# Patient Record
Sex: FEMALE | Race: WHITE | NOT HISPANIC OR LATINO | Employment: FULL TIME | URBAN - METROPOLITAN AREA
[De-identification: names, ages, dates, MRNs, and addresses within clinical notes are randomized per-mention and may not be internally consistent; named-entity substitution may affect disease eponyms.]

---

## 2017-01-18 ENCOUNTER — OFFICE VISIT - HEALTHEAST (OUTPATIENT)
Dept: FAMILY MEDICINE | Facility: CLINIC | Age: 57
End: 2017-01-18

## 2017-01-18 DIAGNOSIS — E03.9 HYPOTHYROIDISM, UNSPECIFIED TYPE: ICD-10-CM

## 2017-01-18 DIAGNOSIS — Z00.00 ROUTINE GENERAL MEDICAL EXAMINATION AT A HEALTH CARE FACILITY: ICD-10-CM

## 2017-01-18 DIAGNOSIS — E11.9 TYPE 2 DIABETES MELLITUS WITHOUT COMPLICATION, WITHOUT LONG-TERM CURRENT USE OF INSULIN (H): ICD-10-CM

## 2017-01-18 DIAGNOSIS — E78.01 FAMILIAL HYPERCHOLESTEROLEMIA: ICD-10-CM

## 2017-01-18 DIAGNOSIS — D64.9 ANEMIA, UNSPECIFIED: ICD-10-CM

## 2017-01-18 DIAGNOSIS — R00.0 RACING HEART BEAT: ICD-10-CM

## 2017-01-18 LAB
CHOLEST SERPL-MCNC: 194 MG/DL
HBA1C MFR BLD: 11.4 % (ref 3.5–6)
HDLC SERPL-MCNC: 51 MG/DL
LDLC SERPL CALC-MCNC: 115 MG/DL
TRIGL SERPL-MCNC: 140 MG/DL

## 2017-01-18 ASSESSMENT — MIFFLIN-ST. JEOR: SCORE: 1255.37

## 2017-01-19 ENCOUNTER — COMMUNICATION - HEALTHEAST (OUTPATIENT)
Dept: FAMILY MEDICINE | Facility: CLINIC | Age: 57
End: 2017-01-19

## 2017-01-25 ENCOUNTER — OFFICE VISIT - HEALTHEAST (OUTPATIENT)
Dept: EDUCATION SERVICES | Facility: CLINIC | Age: 57
End: 2017-01-25

## 2017-01-25 ENCOUNTER — COMMUNICATION - HEALTHEAST (OUTPATIENT)
Dept: EDUCATION SERVICES | Facility: CLINIC | Age: 57
End: 2017-01-25

## 2017-01-25 DIAGNOSIS — E11.9 TYPE 2 DIABETES MELLITUS WITHOUT COMPLICATION, WITHOUT LONG-TERM CURRENT USE OF INSULIN (H): ICD-10-CM

## 2017-02-02 ENCOUNTER — COMMUNICATION - HEALTHEAST (OUTPATIENT)
Dept: FAMILY MEDICINE | Facility: CLINIC | Age: 57
End: 2017-02-02

## 2017-02-02 DIAGNOSIS — E03.9 HYPOTHYROIDISM: ICD-10-CM

## 2017-02-02 DIAGNOSIS — E11.9 DIABETES MELLITUS, TYPE 2 (H): ICD-10-CM

## 2017-02-02 DIAGNOSIS — B00.1 COLD SORE: ICD-10-CM

## 2017-02-10 ENCOUNTER — COMMUNICATION - HEALTHEAST (OUTPATIENT)
Dept: FAMILY MEDICINE | Facility: CLINIC | Age: 57
End: 2017-02-10

## 2017-02-15 ENCOUNTER — COMMUNICATION - HEALTHEAST (OUTPATIENT)
Dept: EDUCATION SERVICES | Facility: CLINIC | Age: 57
End: 2017-02-15

## 2017-03-10 ENCOUNTER — COMMUNICATION - HEALTHEAST (OUTPATIENT)
Dept: EDUCATION SERVICES | Facility: CLINIC | Age: 57
End: 2017-03-10

## 2017-03-10 ENCOUNTER — COMMUNICATION - HEALTHEAST (OUTPATIENT)
Dept: FAMILY MEDICINE | Facility: CLINIC | Age: 57
End: 2017-03-10

## 2017-03-10 DIAGNOSIS — E11.9 DIABETES MELLITUS (H): ICD-10-CM

## 2017-03-11 ENCOUNTER — COMMUNICATION - HEALTHEAST (OUTPATIENT)
Dept: FAMILY MEDICINE | Facility: CLINIC | Age: 57
End: 2017-03-11

## 2017-03-11 DIAGNOSIS — E11.9 DIABETES MELLITUS (H): ICD-10-CM

## 2017-03-11 DIAGNOSIS — E11.9 TYPE 2 DIABETES MELLITUS (H): ICD-10-CM

## 2017-03-26 ENCOUNTER — COMMUNICATION - HEALTHEAST (OUTPATIENT)
Dept: FAMILY MEDICINE | Facility: CLINIC | Age: 57
End: 2017-03-26

## 2017-03-26 DIAGNOSIS — E78.5 HYPERLIPIDEMIA: ICD-10-CM

## 2017-05-03 ENCOUNTER — COMMUNICATION - HEALTHEAST (OUTPATIENT)
Dept: SCHEDULING | Facility: CLINIC | Age: 57
End: 2017-05-03

## 2017-05-03 DIAGNOSIS — E11.9 DIABETES MELLITUS (H): ICD-10-CM

## 2017-05-24 ENCOUNTER — OFFICE VISIT - HEALTHEAST (OUTPATIENT)
Dept: FAMILY MEDICINE | Facility: CLINIC | Age: 57
End: 2017-05-24

## 2017-05-24 DIAGNOSIS — E11.9 TYPE 2 DIABETES MELLITUS WITHOUT COMPLICATION, WITHOUT LONG-TERM CURRENT USE OF INSULIN (H): ICD-10-CM

## 2017-05-24 LAB — HBA1C MFR BLD: 9.3 % (ref 3.5–6)

## 2017-05-24 ASSESSMENT — MIFFLIN-ST. JEOR: SCORE: 1239.5

## 2017-05-31 ENCOUNTER — COMMUNICATION - HEALTHEAST (OUTPATIENT)
Dept: FAMILY MEDICINE | Facility: CLINIC | Age: 57
End: 2017-05-31

## 2017-06-06 ENCOUNTER — COMMUNICATION - HEALTHEAST (OUTPATIENT)
Dept: SCHEDULING | Facility: CLINIC | Age: 57
End: 2017-06-06

## 2017-06-23 ENCOUNTER — COMMUNICATION - HEALTHEAST (OUTPATIENT)
Dept: FAMILY MEDICINE | Facility: CLINIC | Age: 57
End: 2017-06-23

## 2017-06-23 ENCOUNTER — COMMUNICATION - HEALTHEAST (OUTPATIENT)
Dept: SCHEDULING | Facility: CLINIC | Age: 57
End: 2017-06-23

## 2017-06-23 DIAGNOSIS — E78.5 HYPERLIPIDEMIA: ICD-10-CM

## 2017-06-23 DIAGNOSIS — E11.9 TYPE 2 DIABETES MELLITUS WITHOUT COMPLICATION, WITHOUT LONG-TERM CURRENT USE OF INSULIN (H): ICD-10-CM

## 2017-06-23 DIAGNOSIS — E11.9 TYPE 2 DIABETES MELLITUS (H): ICD-10-CM

## 2017-08-05 ENCOUNTER — COMMUNICATION - HEALTHEAST (OUTPATIENT)
Dept: FAMILY MEDICINE | Facility: CLINIC | Age: 57
End: 2017-08-05

## 2017-08-05 DIAGNOSIS — E78.5 HYPERLIPIDEMIA: ICD-10-CM

## 2017-08-09 ENCOUNTER — COMMUNICATION - HEALTHEAST (OUTPATIENT)
Dept: FAMILY MEDICINE | Facility: CLINIC | Age: 57
End: 2017-08-09

## 2017-08-09 DIAGNOSIS — E11.9 DIABETES MELLITUS (H): ICD-10-CM

## 2017-10-05 ENCOUNTER — COMMUNICATION - HEALTHEAST (OUTPATIENT)
Dept: FAMILY MEDICINE | Facility: CLINIC | Age: 57
End: 2017-10-05

## 2017-10-13 ENCOUNTER — COMMUNICATION - HEALTHEAST (OUTPATIENT)
Dept: FAMILY MEDICINE | Facility: CLINIC | Age: 57
End: 2017-10-13

## 2017-10-13 DIAGNOSIS — E11.9 TYPE 2 DIABETES MELLITUS (H): ICD-10-CM

## 2017-10-18 ENCOUNTER — OFFICE VISIT - HEALTHEAST (OUTPATIENT)
Dept: FAMILY MEDICINE | Facility: CLINIC | Age: 57
End: 2017-10-18

## 2017-10-18 DIAGNOSIS — M25.549 PAIN IN MULTIPLE FINGER JOINTS: ICD-10-CM

## 2017-10-18 DIAGNOSIS — E11.9 TYPE 2 DIABETES MELLITUS WITHOUT COMPLICATION, WITHOUT LONG-TERM CURRENT USE OF INSULIN (H): ICD-10-CM

## 2017-10-18 LAB — HBA1C MFR BLD: 7.8 % (ref 3.5–6)

## 2017-10-18 ASSESSMENT — MIFFLIN-ST. JEOR: SCORE: 1224.53

## 2017-10-23 ENCOUNTER — OFFICE VISIT - HEALTHEAST (OUTPATIENT)
Dept: EDUCATION SERVICES | Facility: CLINIC | Age: 57
End: 2017-10-23

## 2017-10-23 ENCOUNTER — COMMUNICATION - HEALTHEAST (OUTPATIENT)
Dept: ENDOCRINOLOGY | Facility: CLINIC | Age: 57
End: 2017-10-23

## 2017-10-23 DIAGNOSIS — E11.9 TYPE 2 DIABETES MELLITUS WITHOUT COMPLICATION, WITHOUT LONG-TERM CURRENT USE OF INSULIN (H): ICD-10-CM

## 2017-12-02 ENCOUNTER — COMMUNICATION - HEALTHEAST (OUTPATIENT)
Dept: FAMILY MEDICINE | Facility: CLINIC | Age: 57
End: 2017-12-02

## 2017-12-02 DIAGNOSIS — E11.9 DIABETES MELLITUS, TYPE 2 (H): ICD-10-CM

## 2017-12-18 ENCOUNTER — RECORDS - HEALTHEAST (OUTPATIENT)
Dept: ADMINISTRATIVE | Facility: OTHER | Age: 57
End: 2017-12-18

## 2017-12-27 ENCOUNTER — COMMUNICATION - HEALTHEAST (OUTPATIENT)
Dept: FAMILY MEDICINE | Facility: CLINIC | Age: 57
End: 2017-12-27

## 2017-12-27 DIAGNOSIS — E11.9 DIABETES MELLITUS (H): ICD-10-CM

## 2017-12-29 ENCOUNTER — RECORDS - HEALTHEAST (OUTPATIENT)
Dept: ADMINISTRATIVE | Facility: OTHER | Age: 57
End: 2017-12-29

## 2018-01-08 ENCOUNTER — RECORDS - HEALTHEAST (OUTPATIENT)
Dept: ADMINISTRATIVE | Facility: OTHER | Age: 58
End: 2018-01-08

## 2018-01-28 ENCOUNTER — COMMUNICATION - HEALTHEAST (OUTPATIENT)
Dept: FAMILY MEDICINE | Facility: CLINIC | Age: 58
End: 2018-01-28

## 2018-01-28 DIAGNOSIS — E03.9 HYPOTHYROIDISM: ICD-10-CM

## 2018-01-31 ENCOUNTER — COMMUNICATION - HEALTHEAST (OUTPATIENT)
Dept: FAMILY MEDICINE | Facility: CLINIC | Age: 58
End: 2018-01-31

## 2018-03-16 ENCOUNTER — COMMUNICATION - HEALTHEAST (OUTPATIENT)
Dept: FAMILY MEDICINE | Facility: CLINIC | Age: 58
End: 2018-03-16

## 2018-03-16 DIAGNOSIS — E78.5 HYPERLIPIDEMIA: ICD-10-CM

## 2018-03-24 ENCOUNTER — COMMUNICATION - HEALTHEAST (OUTPATIENT)
Dept: FAMILY MEDICINE | Facility: CLINIC | Age: 58
End: 2018-03-24

## 2018-03-24 DIAGNOSIS — E03.9 HYPOTHYROIDISM, UNSPECIFIED TYPE: ICD-10-CM

## 2018-03-24 DIAGNOSIS — E03.9 HYPOTHYROIDISM: ICD-10-CM

## 2018-06-18 ENCOUNTER — COMMUNICATION - HEALTHEAST (OUTPATIENT)
Dept: FAMILY MEDICINE | Facility: CLINIC | Age: 58
End: 2018-06-18

## 2018-06-18 DIAGNOSIS — E11.9 DIABETES MELLITUS, TYPE 2 (H): ICD-10-CM

## 2018-08-01 ENCOUNTER — COMMUNICATION - HEALTHEAST (OUTPATIENT)
Dept: FAMILY MEDICINE | Facility: CLINIC | Age: 58
End: 2018-08-01

## 2018-08-01 DIAGNOSIS — E03.9 HYPOTHYROIDISM, UNSPECIFIED TYPE: ICD-10-CM

## 2018-09-13 ENCOUNTER — COMMUNICATION - HEALTHEAST (OUTPATIENT)
Dept: FAMILY MEDICINE | Facility: CLINIC | Age: 58
End: 2018-09-13

## 2018-09-13 DIAGNOSIS — E11.9 DIABETES MELLITUS, TYPE 2 (H): ICD-10-CM

## 2018-09-27 ENCOUNTER — COMMUNICATION - HEALTHEAST (OUTPATIENT)
Dept: FAMILY MEDICINE | Facility: CLINIC | Age: 58
End: 2018-09-27

## 2018-09-27 DIAGNOSIS — E78.5 HYPERLIPIDEMIA: ICD-10-CM

## 2018-11-01 ENCOUNTER — COMMUNICATION - HEALTHEAST (OUTPATIENT)
Dept: FAMILY MEDICINE | Facility: CLINIC | Age: 58
End: 2018-11-01

## 2018-11-01 DIAGNOSIS — E11.9 TYPE 2 DIABETES MELLITUS WITHOUT COMPLICATION, WITHOUT LONG-TERM CURRENT USE OF INSULIN (H): ICD-10-CM

## 2018-11-06 ENCOUNTER — COMMUNICATION - HEALTHEAST (OUTPATIENT)
Dept: FAMILY MEDICINE | Facility: CLINIC | Age: 58
End: 2018-11-06

## 2018-11-14 ENCOUNTER — COMMUNICATION - HEALTHEAST (OUTPATIENT)
Dept: FAMILY MEDICINE | Facility: CLINIC | Age: 58
End: 2018-11-14

## 2018-11-14 DIAGNOSIS — E03.9 HYPOTHYROIDISM, UNSPECIFIED TYPE: ICD-10-CM

## 2018-12-03 ENCOUNTER — COMMUNICATION - HEALTHEAST (OUTPATIENT)
Dept: FAMILY MEDICINE | Facility: CLINIC | Age: 58
End: 2018-12-03

## 2018-12-03 DIAGNOSIS — E11.9 TYPE 2 DIABETES MELLITUS WITHOUT COMPLICATION, WITHOUT LONG-TERM CURRENT USE OF INSULIN (H): ICD-10-CM

## 2018-12-05 ENCOUNTER — OFFICE VISIT - HEALTHEAST (OUTPATIENT)
Dept: FAMILY MEDICINE | Facility: CLINIC | Age: 58
End: 2018-12-05

## 2018-12-05 ENCOUNTER — HOSPITAL ENCOUNTER (OUTPATIENT)
Dept: ULTRASOUND IMAGING | Facility: HOSPITAL | Age: 58
Discharge: HOME | End: 2018-12-05
Attending: FAMILY MEDICINE

## 2018-12-05 DIAGNOSIS — Z00.00 ROUTINE GENERAL MEDICAL EXAMINATION AT A HEALTH CARE FACILITY: ICD-10-CM

## 2018-12-05 DIAGNOSIS — E11.9 TYPE 2 DIABETES MELLITUS WITHOUT COMPLICATION, WITHOUT LONG-TERM CURRENT USE OF INSULIN (H): ICD-10-CM

## 2018-12-05 DIAGNOSIS — N95.0 POSTMENOPAUSAL BLEEDING: ICD-10-CM

## 2018-12-05 DIAGNOSIS — Z12.4 SCREENING FOR MALIGNANT NEOPLASM OF CERVIX: ICD-10-CM

## 2018-12-05 DIAGNOSIS — E03.9 HYPOTHYROIDISM, UNSPECIFIED TYPE: ICD-10-CM

## 2018-12-05 DIAGNOSIS — E78.01 FAMILIAL HYPERCHOLESTEROLEMIA: ICD-10-CM

## 2018-12-05 LAB
ANION GAP SERPL CALCULATED.3IONS-SCNC: 13 MMOL/L (ref 5–18)
BUN SERPL-MCNC: 14 MG/DL (ref 8–22)
CALCIUM SERPL-MCNC: 10.1 MG/DL (ref 8.5–10.5)
CHLORIDE BLD-SCNC: 105 MMOL/L (ref 98–107)
CHOLEST SERPL-MCNC: 163 MG/DL
CO2 SERPL-SCNC: 22 MMOL/L (ref 22–31)
CREAT SERPL-MCNC: 0.84 MG/DL (ref 0.6–1.1)
CREAT UR-MCNC: 173.8 MG/DL
FASTING STATUS PATIENT QL REPORTED: NO
GFR SERPL CREATININE-BSD FRML MDRD: >60 ML/MIN/1.73M2
GLUCOSE BLD-MCNC: 152 MG/DL (ref 70–125)
HBA1C MFR BLD: 7.9 % (ref 3.5–6)
HDLC SERPL-MCNC: 61 MG/DL
LDLC SERPL CALC-MCNC: 66 MG/DL
MICROALBUMIN UR-MCNC: 1.21 MG/DL (ref 0–1.99)
MICROALBUMIN/CREAT UR: 7 MG/G
POTASSIUM BLD-SCNC: 4.8 MMOL/L (ref 3.5–5)
SODIUM SERPL-SCNC: 140 MMOL/L (ref 136–145)
TRIGL SERPL-MCNC: 178 MG/DL
TSH SERPL DL<=0.005 MIU/L-ACNC: 0.71 UIU/ML (ref 0.3–5)

## 2018-12-05 ASSESSMENT — MIFFLIN-ST. JEOR: SCORE: 1225.43

## 2018-12-06 LAB
HPV SOURCE: NORMAL
HUMAN PAPILLOMA VIRUS 16 DNA: NEGATIVE
HUMAN PAPILLOMA VIRUS 18 DNA: NEGATIVE
HUMAN PAPILLOMA VIRUS FINAL DIAGNOSIS: NORMAL
HUMAN PAPILLOMA VIRUS OTHER HR: NEGATIVE
SPECIMEN DESCRIPTION: NORMAL

## 2018-12-07 ENCOUNTER — RECORDS - HEALTHEAST (OUTPATIENT)
Dept: ADMINISTRATIVE | Facility: OTHER | Age: 58
End: 2018-12-07

## 2018-12-13 LAB
BKR LAB AP ABNORMAL BLEEDING: NORMAL
BKR LAB AP BIRTH CONTROL/HORMONES: NORMAL
BKR LAB AP CERVICAL APPEARANCE: NORMAL
BKR LAB AP GYN ADEQUACY: NORMAL
BKR LAB AP GYN INTERPRETATION: NORMAL
BKR LAB AP HPV REFLEX: NORMAL
BKR LAB AP LMP: NORMAL
BKR LAB AP PATIENT STATUS: NORMAL
BKR LAB AP PREVIOUS ABNORMAL: NORMAL
BKR LAB AP PREVIOUS NORMAL: NORMAL
HIGH RISK?: NO
PATH REPORT.COMMENTS IMP SPEC: NORMAL
RESULT FLAG (HE HISTORICAL CONVERSION): NORMAL

## 2018-12-21 ENCOUNTER — HOSPITAL ENCOUNTER (OUTPATIENT)
Dept: MAMMOGRAPHY | Facility: CLINIC | Age: 58
Discharge: HOME | End: 2018-12-21
Attending: FAMILY MEDICINE

## 2018-12-21 DIAGNOSIS — Z12.31 VISIT FOR SCREENING MAMMOGRAM: ICD-10-CM

## 2018-12-28 ENCOUNTER — COMMUNICATION - HEALTHEAST (OUTPATIENT)
Dept: FAMILY MEDICINE | Facility: CLINIC | Age: 58
End: 2018-12-28

## 2018-12-28 DIAGNOSIS — E11.9 TYPE 2 DIABETES MELLITUS WITHOUT COMPLICATION, WITHOUT LONG-TERM CURRENT USE OF INSULIN (H): ICD-10-CM

## 2019-01-13 ENCOUNTER — COMMUNICATION - HEALTHEAST (OUTPATIENT)
Dept: FAMILY MEDICINE | Facility: CLINIC | Age: 59
End: 2019-01-13

## 2019-01-13 DIAGNOSIS — E78.5 HYPERLIPIDEMIA: ICD-10-CM

## 2019-02-09 ENCOUNTER — COMMUNICATION - HEALTHEAST (OUTPATIENT)
Dept: FAMILY MEDICINE | Facility: CLINIC | Age: 59
End: 2019-02-09

## 2019-02-09 DIAGNOSIS — E03.9 HYPOTHYROIDISM, UNSPECIFIED TYPE: ICD-10-CM

## 2019-03-20 ENCOUNTER — COMMUNICATION - HEALTHEAST (OUTPATIENT)
Dept: FAMILY MEDICINE | Facility: CLINIC | Age: 59
End: 2019-03-20

## 2019-03-20 DIAGNOSIS — E11.9 DIABETES MELLITUS, TYPE 2 (H): ICD-10-CM

## 2019-03-29 ENCOUNTER — RECORDS - HEALTHEAST (OUTPATIENT)
Dept: ADMINISTRATIVE | Facility: OTHER | Age: 59
End: 2019-03-29

## 2019-04-03 ENCOUNTER — COMMUNICATION - HEALTHEAST (OUTPATIENT)
Dept: FAMILY MEDICINE | Facility: CLINIC | Age: 59
End: 2019-04-03

## 2019-04-03 ENCOUNTER — OFFICE VISIT - HEALTHEAST (OUTPATIENT)
Dept: FAMILY MEDICINE | Facility: CLINIC | Age: 59
End: 2019-04-03

## 2019-04-03 DIAGNOSIS — M65.4 DE QUERVAIN'S DISEASE (RADIAL STYLOID TENOSYNOVITIS): ICD-10-CM

## 2019-04-03 DIAGNOSIS — B00.1 RECURRENT COLD SORES: ICD-10-CM

## 2019-04-03 DIAGNOSIS — E11.9 TYPE 2 DIABETES MELLITUS WITHOUT COMPLICATION, WITHOUT LONG-TERM CURRENT USE OF INSULIN (H): ICD-10-CM

## 2019-04-03 DIAGNOSIS — G56.01 CARPAL TUNNEL SYNDROME OF RIGHT WRIST: ICD-10-CM

## 2019-04-03 DIAGNOSIS — M65.30 TRIGGER FINGER, ACQUIRED: ICD-10-CM

## 2019-04-03 DIAGNOSIS — Z01.818 PREOPERATIVE EXAMINATION: ICD-10-CM

## 2019-04-03 LAB
ANION GAP SERPL CALCULATED.3IONS-SCNC: 12 MMOL/L (ref 5–18)
BUN SERPL-MCNC: 15 MG/DL (ref 8–22)
CALCIUM SERPL-MCNC: 10.2 MG/DL (ref 8.5–10.5)
CHLORIDE BLD-SCNC: 105 MMOL/L (ref 98–107)
CO2 SERPL-SCNC: 23 MMOL/L (ref 22–31)
CREAT SERPL-MCNC: 0.84 MG/DL (ref 0.6–1.1)
GFR SERPL CREATININE-BSD FRML MDRD: >60 ML/MIN/1.73M2
GLUCOSE BLD-MCNC: 166 MG/DL (ref 70–125)
HBA1C MFR BLD: 7.6 % (ref 3.5–6)
HGB BLD-MCNC: 11.1 G/DL (ref 12–16)
POTASSIUM BLD-SCNC: 4.5 MMOL/L (ref 3.5–5)
SODIUM SERPL-SCNC: 140 MMOL/L (ref 136–145)

## 2019-04-03 ASSESSMENT — MIFFLIN-ST. JEOR: SCORE: 1218.63

## 2019-05-03 ENCOUNTER — RECORDS - HEALTHEAST (OUTPATIENT)
Dept: ADMINISTRATIVE | Facility: OTHER | Age: 59
End: 2019-05-03

## 2019-05-12 ENCOUNTER — COMMUNICATION - HEALTHEAST (OUTPATIENT)
Dept: FAMILY MEDICINE | Facility: CLINIC | Age: 59
End: 2019-05-12

## 2019-05-12 DIAGNOSIS — E11.9 TYPE 2 DIABETES MELLITUS WITHOUT COMPLICATION, WITHOUT LONG-TERM CURRENT USE OF INSULIN (H): ICD-10-CM

## 2019-05-23 ENCOUNTER — COMMUNICATION - HEALTHEAST (OUTPATIENT)
Dept: FAMILY MEDICINE | Facility: CLINIC | Age: 59
End: 2019-05-23

## 2019-05-23 DIAGNOSIS — E11.9 TYPE 2 DIABETES MELLITUS WITHOUT COMPLICATION, WITHOUT LONG-TERM CURRENT USE OF INSULIN (H): ICD-10-CM

## 2019-05-31 ENCOUNTER — RECORDS - HEALTHEAST (OUTPATIENT)
Dept: ADMINISTRATIVE | Facility: OTHER | Age: 59
End: 2019-05-31

## 2019-08-09 ENCOUNTER — COMMUNICATION - HEALTHEAST (OUTPATIENT)
Dept: FAMILY MEDICINE | Facility: CLINIC | Age: 59
End: 2019-08-09

## 2019-08-09 DIAGNOSIS — E11.9 TYPE 2 DIABETES MELLITUS WITHOUT COMPLICATION, WITHOUT LONG-TERM CURRENT USE OF INSULIN (H): ICD-10-CM

## 2019-08-30 ENCOUNTER — COMMUNICATION - HEALTHEAST (OUTPATIENT)
Dept: FAMILY MEDICINE | Facility: CLINIC | Age: 59
End: 2019-08-30

## 2019-10-30 ENCOUNTER — COMMUNICATION - HEALTHEAST (OUTPATIENT)
Dept: FAMILY MEDICINE | Facility: CLINIC | Age: 59
End: 2019-10-30

## 2019-10-30 DIAGNOSIS — E11.9 TYPE 2 DIABETES MELLITUS WITHOUT COMPLICATION, WITHOUT LONG-TERM CURRENT USE OF INSULIN (H): ICD-10-CM

## 2019-11-04 ENCOUNTER — OFFICE VISIT - HEALTHEAST (OUTPATIENT)
Dept: FAMILY MEDICINE | Facility: CLINIC | Age: 59
End: 2019-11-04

## 2019-11-04 ENCOUNTER — COMMUNICATION - HEALTHEAST (OUTPATIENT)
Dept: SCHEDULING | Facility: CLINIC | Age: 59
End: 2019-11-04

## 2019-11-04 DIAGNOSIS — J02.9 SORE THROAT: ICD-10-CM

## 2019-11-04 DIAGNOSIS — R07.0 THROAT PAIN: ICD-10-CM

## 2019-11-04 DIAGNOSIS — J01.90 ACUTE NON-RECURRENT SINUSITIS, UNSPECIFIED LOCATION: ICD-10-CM

## 2019-11-04 DIAGNOSIS — E11.9 TYPE 2 DIABETES MELLITUS WITHOUT COMPLICATION, WITHOUT LONG-TERM CURRENT USE OF INSULIN (H): ICD-10-CM

## 2019-11-04 LAB — DEPRECATED S PYO AG THROAT QL EIA: NORMAL

## 2019-11-05 LAB — GROUP A STREP BY PCR: NORMAL

## 2019-11-22 ENCOUNTER — OFFICE VISIT - HEALTHEAST (OUTPATIENT)
Dept: FAMILY MEDICINE | Facility: CLINIC | Age: 59
End: 2019-11-22

## 2019-11-22 DIAGNOSIS — E78.01 FAMILIAL HYPERCHOLESTEROLEMIA: ICD-10-CM

## 2019-11-22 DIAGNOSIS — E11.65 TYPE 2 DIABETES MELLITUS WITH HYPERGLYCEMIA, WITHOUT LONG-TERM CURRENT USE OF INSULIN (H): ICD-10-CM

## 2019-11-22 DIAGNOSIS — E03.9 HYPOTHYROIDISM, UNSPECIFIED TYPE: ICD-10-CM

## 2019-11-22 DIAGNOSIS — D50.9 IRON DEFICIENCY ANEMIA, UNSPECIFIED IRON DEFICIENCY ANEMIA TYPE: ICD-10-CM

## 2019-11-22 LAB
CHOLEST SERPL-MCNC: 134 MG/DL
ERYTHROCYTE [DISTWIDTH] IN BLOOD BY AUTOMATED COUNT: 14.4 % (ref 11–14.5)
FASTING STATUS PATIENT QL REPORTED: YES
HBA1C MFR BLD: 9.2 % (ref 3.5–6)
HCT VFR BLD AUTO: 35.8 % (ref 35–47)
HDLC SERPL-MCNC: 46 MG/DL
HGB BLD-MCNC: 11.2 G/DL (ref 12–16)
IRON SATN MFR SERPL: 9 % (ref 20–50)
IRON SERPL-MCNC: 29 UG/DL (ref 42–175)
LDLC SERPL CALC-MCNC: 69 MG/DL
MCH RBC QN AUTO: 24.1 PG (ref 27–34)
MCHC RBC AUTO-ENTMCNC: 31.4 G/DL (ref 32–36)
MCV RBC AUTO: 77 FL (ref 80–100)
PLATELET # BLD AUTO: 262 THOU/UL (ref 140–440)
PMV BLD AUTO: 7.6 FL (ref 7–10)
RBC # BLD AUTO: 4.66 MILL/UL (ref 3.8–5.4)
TIBC SERPL-MCNC: 341 UG/DL (ref 313–563)
TRANSFERRIN SERPL-MCNC: 273 MG/DL (ref 212–360)
TRIGL SERPL-MCNC: 93 MG/DL
TSH SERPL DL<=0.005 MIU/L-ACNC: 0.04 UIU/ML (ref 0.3–5)
WBC: 5 THOU/UL (ref 4–11)

## 2019-11-22 ASSESSMENT — MIFFLIN-ST. JEOR: SCORE: 1209.1

## 2019-11-25 ENCOUNTER — COMMUNICATION - HEALTHEAST (OUTPATIENT)
Dept: FAMILY MEDICINE | Facility: CLINIC | Age: 59
End: 2019-11-25

## 2019-11-25 DIAGNOSIS — D50.9 IRON DEFICIENCY ANEMIA, UNSPECIFIED IRON DEFICIENCY ANEMIA TYPE: ICD-10-CM

## 2019-11-26 ENCOUNTER — COMMUNICATION - HEALTHEAST (OUTPATIENT)
Dept: FAMILY MEDICINE | Facility: CLINIC | Age: 59
End: 2019-11-26

## 2019-11-26 DIAGNOSIS — E11.9 TYPE 2 DIABETES MELLITUS WITHOUT COMPLICATION, WITHOUT LONG-TERM CURRENT USE OF INSULIN (H): ICD-10-CM

## 2019-11-27 ENCOUNTER — OFFICE VISIT - HEALTHEAST (OUTPATIENT)
Dept: EDUCATION SERVICES | Facility: CLINIC | Age: 59
End: 2019-11-27

## 2019-11-27 DIAGNOSIS — E11.9 TYPE 2 DIABETES MELLITUS WITHOUT COMPLICATION, WITHOUT LONG-TERM CURRENT USE OF INSULIN (H): ICD-10-CM

## 2019-12-02 ENCOUNTER — OFFICE VISIT - HEALTHEAST (OUTPATIENT)
Dept: EDUCATION SERVICES | Facility: CLINIC | Age: 59
End: 2019-12-02

## 2019-12-02 DIAGNOSIS — E11.9 TYPE 2 DIABETES MELLITUS WITHOUT COMPLICATION, WITHOUT LONG-TERM CURRENT USE OF INSULIN (H): ICD-10-CM

## 2019-12-10 ENCOUNTER — COMMUNICATION - HEALTHEAST (OUTPATIENT)
Dept: FAMILY MEDICINE | Facility: CLINIC | Age: 59
End: 2019-12-10

## 2019-12-10 DIAGNOSIS — E11.9 TYPE 2 DIABETES MELLITUS WITHOUT COMPLICATION, WITHOUT LONG-TERM CURRENT USE OF INSULIN (H): ICD-10-CM

## 2019-12-13 ENCOUNTER — RECORDS - HEALTHEAST (OUTPATIENT)
Dept: ADMINISTRATIVE | Facility: OTHER | Age: 59
End: 2019-12-13

## 2019-12-13 LAB — RETINOPATHY: NEGATIVE

## 2019-12-16 ENCOUNTER — RECORDS - HEALTHEAST (OUTPATIENT)
Dept: ADMINISTRATIVE | Facility: OTHER | Age: 59
End: 2019-12-16

## 2019-12-23 ENCOUNTER — COMMUNICATION - HEALTHEAST (OUTPATIENT)
Dept: FAMILY MEDICINE | Facility: CLINIC | Age: 59
End: 2019-12-23

## 2019-12-23 DIAGNOSIS — E78.5 HYPERLIPIDEMIA: ICD-10-CM

## 2019-12-23 DIAGNOSIS — E11.9 DIABETES MELLITUS, TYPE 2 (H): ICD-10-CM

## 2019-12-24 ENCOUNTER — RECORDS - HEALTHEAST (OUTPATIENT)
Dept: HEALTH INFORMATION MANAGEMENT | Facility: CLINIC | Age: 59
End: 2019-12-24

## 2019-12-31 ENCOUNTER — COMMUNICATION - HEALTHEAST (OUTPATIENT)
Dept: ADMINISTRATIVE | Facility: CLINIC | Age: 59
End: 2019-12-31

## 2019-12-31 ENCOUNTER — COMMUNICATION - HEALTHEAST (OUTPATIENT)
Dept: FAMILY MEDICINE | Facility: CLINIC | Age: 59
End: 2019-12-31

## 2019-12-31 ENCOUNTER — OFFICE VISIT - HEALTHEAST (OUTPATIENT)
Dept: EDUCATION SERVICES | Facility: CLINIC | Age: 59
End: 2019-12-31

## 2019-12-31 DIAGNOSIS — E11.9 TYPE 2 DIABETES MELLITUS WITHOUT COMPLICATION, WITHOUT LONG-TERM CURRENT USE OF INSULIN (H): ICD-10-CM

## 2020-01-01 ENCOUNTER — RECORDS - HEALTHEAST (OUTPATIENT)
Dept: ADMINISTRATIVE | Facility: OTHER | Age: 60
End: 2020-01-01

## 2020-01-08 ENCOUNTER — COMMUNICATION - HEALTHEAST (OUTPATIENT)
Dept: FAMILY MEDICINE | Facility: CLINIC | Age: 60
End: 2020-01-08

## 2020-01-08 DIAGNOSIS — D50.9 IRON DEFICIENCY ANEMIA, UNSPECIFIED IRON DEFICIENCY ANEMIA TYPE: ICD-10-CM

## 2020-01-08 DIAGNOSIS — E11.9 TYPE 2 DIABETES MELLITUS WITHOUT COMPLICATION, WITHOUT LONG-TERM CURRENT USE OF INSULIN (H): ICD-10-CM

## 2020-01-08 DIAGNOSIS — E78.5 HYPERLIPIDEMIA: ICD-10-CM

## 2020-01-08 DIAGNOSIS — E03.9 HYPOTHYROIDISM, UNSPECIFIED TYPE: ICD-10-CM

## 2020-01-08 DIAGNOSIS — E11.9 DIABETES MELLITUS, TYPE 2 (H): ICD-10-CM

## 2020-01-10 ENCOUNTER — COMMUNICATION - HEALTHEAST (OUTPATIENT)
Dept: SCHEDULING | Facility: CLINIC | Age: 60
End: 2020-01-10

## 2020-01-10 ENCOUNTER — COMMUNICATION - HEALTHEAST (OUTPATIENT)
Dept: FAMILY MEDICINE | Facility: CLINIC | Age: 60
End: 2020-01-10

## 2020-01-17 ENCOUNTER — COMMUNICATION - HEALTHEAST (OUTPATIENT)
Dept: SCHEDULING | Facility: CLINIC | Age: 60
End: 2020-01-17

## 2020-01-18 ENCOUNTER — RECORDS - HEALTHEAST (OUTPATIENT)
Dept: ADMINISTRATIVE | Facility: OTHER | Age: 60
End: 2020-01-18

## 2020-01-20 ENCOUNTER — COMMUNICATION - HEALTHEAST (OUTPATIENT)
Dept: SCHEDULING | Facility: CLINIC | Age: 60
End: 2020-01-20

## 2020-01-26 ENCOUNTER — COMMUNICATION - HEALTHEAST (OUTPATIENT)
Dept: FAMILY MEDICINE | Facility: CLINIC | Age: 60
End: 2020-01-26

## 2020-01-26 DIAGNOSIS — E11.9 DIABETES MELLITUS, TYPE 2 (H): ICD-10-CM

## 2020-03-17 ENCOUNTER — COMMUNICATION - HEALTHEAST (OUTPATIENT)
Dept: FAMILY MEDICINE | Facility: CLINIC | Age: 60
End: 2020-03-17

## 2020-03-17 DIAGNOSIS — E11.9 TYPE 2 DIABETES MELLITUS WITHOUT COMPLICATION, WITHOUT LONG-TERM CURRENT USE OF INSULIN (H): ICD-10-CM

## 2020-03-23 ENCOUNTER — COMMUNICATION - HEALTHEAST (OUTPATIENT)
Dept: FAMILY MEDICINE | Facility: CLINIC | Age: 60
End: 2020-03-23

## 2020-03-23 DIAGNOSIS — E11.9 TYPE 2 DIABETES MELLITUS WITHOUT COMPLICATION, WITHOUT LONG-TERM CURRENT USE OF INSULIN (H): ICD-10-CM

## 2020-03-25 ENCOUNTER — COMMUNICATION - HEALTHEAST (OUTPATIENT)
Dept: FAMILY MEDICINE | Facility: CLINIC | Age: 60
End: 2020-03-25

## 2020-03-25 DIAGNOSIS — E03.9 HYPOTHYROIDISM, UNSPECIFIED TYPE: ICD-10-CM

## 2020-03-26 ENCOUNTER — COMMUNICATION - HEALTHEAST (OUTPATIENT)
Dept: FAMILY MEDICINE | Facility: CLINIC | Age: 60
End: 2020-03-26

## 2020-03-26 DIAGNOSIS — E11.9 DIABETES MELLITUS, TYPE 2 (H): ICD-10-CM

## 2020-04-06 ENCOUNTER — COMMUNICATION - HEALTHEAST (OUTPATIENT)
Dept: FAMILY MEDICINE | Facility: CLINIC | Age: 60
End: 2020-04-06

## 2020-04-06 DIAGNOSIS — E11.9 TYPE 2 DIABETES MELLITUS WITHOUT COMPLICATION, WITHOUT LONG-TERM CURRENT USE OF INSULIN (H): ICD-10-CM

## 2020-04-07 ENCOUNTER — COMMUNICATION - HEALTHEAST (OUTPATIENT)
Dept: FAMILY MEDICINE | Facility: CLINIC | Age: 60
End: 2020-04-07

## 2020-04-07 DIAGNOSIS — E11.9 DIABETES MELLITUS, TYPE 2 (H): ICD-10-CM

## 2020-04-07 DIAGNOSIS — E03.9 HYPOTHYROIDISM, UNSPECIFIED TYPE: ICD-10-CM

## 2020-04-07 DIAGNOSIS — E11.9 TYPE 2 DIABETES MELLITUS WITHOUT COMPLICATION, WITHOUT LONG-TERM CURRENT USE OF INSULIN (H): ICD-10-CM

## 2020-04-09 ENCOUNTER — COMMUNICATION - HEALTHEAST (OUTPATIENT)
Dept: FAMILY MEDICINE | Facility: CLINIC | Age: 60
End: 2020-04-09

## 2020-04-15 ENCOUNTER — COMMUNICATION - HEALTHEAST (OUTPATIENT)
Dept: FAMILY MEDICINE | Facility: CLINIC | Age: 60
End: 2020-04-15

## 2020-04-16 ENCOUNTER — AMBULATORY - HEALTHEAST (OUTPATIENT)
Dept: LAB | Facility: CLINIC | Age: 60
End: 2020-04-16

## 2020-04-16 ENCOUNTER — AMBULATORY - HEALTHEAST (OUTPATIENT)
Dept: FAMILY MEDICINE | Facility: CLINIC | Age: 60
End: 2020-04-16

## 2020-04-16 DIAGNOSIS — D50.9 IRON DEFICIENCY ANEMIA, UNSPECIFIED IRON DEFICIENCY ANEMIA TYPE: ICD-10-CM

## 2020-04-16 DIAGNOSIS — E03.9 HYPOTHYROIDISM, UNSPECIFIED TYPE: ICD-10-CM

## 2020-04-16 DIAGNOSIS — E11.9 TYPE 2 DIABETES MELLITUS WITHOUT COMPLICATION, WITHOUT LONG-TERM CURRENT USE OF INSULIN (H): ICD-10-CM

## 2020-04-16 LAB
CREAT UR-MCNC: 66.8 MG/DL
ERYTHROCYTE [DISTWIDTH] IN BLOOD BY AUTOMATED COUNT: 15.5 % (ref 11–14.5)
HBA1C MFR BLD: 8.7 % (ref 3.5–6)
HCT VFR BLD AUTO: 41.2 % (ref 35–47)
HGB BLD-MCNC: 13.6 G/DL (ref 12–16)
MCH RBC QN AUTO: 25.8 PG (ref 27–34)
MCHC RBC AUTO-ENTMCNC: 32.9 G/DL (ref 32–36)
MCV RBC AUTO: 78 FL (ref 80–100)
MICROALBUMIN UR-MCNC: 0.77 MG/DL (ref 0–1.99)
MICROALBUMIN/CREAT UR: 11.5 MG/G
PLATELET # BLD AUTO: 261 THOU/UL (ref 140–440)
PMV BLD AUTO: 7.3 FL (ref 7–10)
RBC # BLD AUTO: 5.26 MILL/UL (ref 3.8–5.4)
TSH SERPL DL<=0.005 MIU/L-ACNC: 2.52 UIU/ML (ref 0.3–5)
WBC: 6.3 THOU/UL (ref 4–11)

## 2020-04-27 ENCOUNTER — COMMUNICATION - HEALTHEAST (OUTPATIENT)
Dept: FAMILY MEDICINE | Facility: CLINIC | Age: 60
End: 2020-04-27

## 2020-04-27 ENCOUNTER — OFFICE VISIT - HEALTHEAST (OUTPATIENT)
Dept: FAMILY MEDICINE | Facility: CLINIC | Age: 60
End: 2020-04-27

## 2020-04-27 DIAGNOSIS — E11.9 TYPE 2 DIABETES MELLITUS WITHOUT COMPLICATION, WITHOUT LONG-TERM CURRENT USE OF INSULIN (H): ICD-10-CM

## 2020-04-27 DIAGNOSIS — E03.9 HYPOTHYROIDISM, UNSPECIFIED TYPE: ICD-10-CM

## 2020-04-27 DIAGNOSIS — D50.9 IRON DEFICIENCY ANEMIA, UNSPECIFIED IRON DEFICIENCY ANEMIA TYPE: ICD-10-CM

## 2020-05-19 ENCOUNTER — COMMUNICATION - HEALTHEAST (OUTPATIENT)
Dept: FAMILY MEDICINE | Facility: CLINIC | Age: 60
End: 2020-05-19

## 2020-09-16 ENCOUNTER — COMMUNICATION - HEALTHEAST (OUTPATIENT)
Dept: FAMILY MEDICINE | Facility: CLINIC | Age: 60
End: 2020-09-16

## 2020-09-16 DIAGNOSIS — E11.9 DIABETES MELLITUS, TYPE 2 (H): ICD-10-CM

## 2020-09-18 ENCOUNTER — COMMUNICATION - HEALTHEAST (OUTPATIENT)
Dept: FAMILY MEDICINE | Facility: CLINIC | Age: 60
End: 2020-09-18

## 2020-09-18 DIAGNOSIS — E11.9 TYPE 2 DIABETES MELLITUS WITHOUT COMPLICATION, WITHOUT LONG-TERM CURRENT USE OF INSULIN (H): ICD-10-CM

## 2020-10-02 ENCOUNTER — COMMUNICATION - HEALTHEAST (OUTPATIENT)
Dept: FAMILY MEDICINE | Facility: CLINIC | Age: 60
End: 2020-10-02

## 2020-10-02 ENCOUNTER — AMBULATORY - HEALTHEAST (OUTPATIENT)
Dept: FAMILY MEDICINE | Facility: CLINIC | Age: 60
End: 2020-10-02

## 2020-10-02 DIAGNOSIS — E11.9 TYPE 2 DIABETES MELLITUS WITHOUT COMPLICATION, WITHOUT LONG-TERM CURRENT USE OF INSULIN (H): ICD-10-CM

## 2020-10-02 DIAGNOSIS — E78.01 FAMILIAL HYPERCHOLESTEROLEMIA: ICD-10-CM

## 2020-10-05 ENCOUNTER — AMBULATORY - HEALTHEAST (OUTPATIENT)
Dept: LAB | Facility: CLINIC | Age: 60
End: 2020-10-05

## 2020-10-05 ENCOUNTER — OFFICE VISIT - HEALTHEAST (OUTPATIENT)
Dept: FAMILY MEDICINE | Facility: CLINIC | Age: 60
End: 2020-10-05

## 2020-10-05 DIAGNOSIS — Z00.00 ROUTINE GENERAL MEDICAL EXAMINATION AT A HEALTH CARE FACILITY: ICD-10-CM

## 2020-10-05 DIAGNOSIS — E11.9 TYPE 2 DIABETES MELLITUS WITHOUT COMPLICATION, WITHOUT LONG-TERM CURRENT USE OF INSULIN (H): ICD-10-CM

## 2020-10-05 DIAGNOSIS — M72.2 PLANTAR FASCIITIS: ICD-10-CM

## 2020-10-05 DIAGNOSIS — Z12.31 VISIT FOR SCREENING MAMMOGRAM: ICD-10-CM

## 2020-10-05 DIAGNOSIS — E78.01 FAMILIAL HYPERCHOLESTEROLEMIA: ICD-10-CM

## 2020-10-05 DIAGNOSIS — E03.9 HYPOTHYROIDISM, UNSPECIFIED TYPE: ICD-10-CM

## 2020-10-05 LAB
ANION GAP SERPL CALCULATED.3IONS-SCNC: 11 MMOL/L (ref 5–18)
BUN SERPL-MCNC: 16 MG/DL (ref 8–22)
CALCIUM SERPL-MCNC: 10 MG/DL (ref 8.5–10.5)
CHLORIDE BLD-SCNC: 106 MMOL/L (ref 98–107)
CHOLEST SERPL-MCNC: 156 MG/DL
CO2 SERPL-SCNC: 25 MMOL/L (ref 22–31)
CREAT SERPL-MCNC: 0.87 MG/DL (ref 0.6–1.1)
FASTING STATUS PATIENT QL REPORTED: YES
GFR SERPL CREATININE-BSD FRML MDRD: >60 ML/MIN/1.73M2
GLUCOSE BLD-MCNC: 138 MG/DL (ref 70–125)
HBA1C MFR BLD: 7.8 %
HDLC SERPL-MCNC: 49 MG/DL
LDLC SERPL CALC-MCNC: 78 MG/DL
POTASSIUM BLD-SCNC: 4.6 MMOL/L (ref 3.5–5)
SODIUM SERPL-SCNC: 142 MMOL/L (ref 136–145)
TRIGL SERPL-MCNC: 143 MG/DL

## 2020-10-05 ASSESSMENT — MIFFLIN-ST. JEOR: SCORE: 1211.37

## 2020-11-06 ENCOUNTER — COMMUNICATION - HEALTHEAST (OUTPATIENT)
Dept: FAMILY MEDICINE | Facility: CLINIC | Age: 60
End: 2020-11-06

## 2020-11-06 DIAGNOSIS — E11.9 DIABETES MELLITUS, TYPE 2 (H): ICD-10-CM

## 2020-11-09 ENCOUNTER — COMMUNICATION - HEALTHEAST (OUTPATIENT)
Dept: FAMILY MEDICINE | Facility: CLINIC | Age: 60
End: 2020-11-09

## 2020-11-19 ENCOUNTER — COMMUNICATION - HEALTHEAST (OUTPATIENT)
Dept: FAMILY MEDICINE | Facility: CLINIC | Age: 60
End: 2020-11-19

## 2020-11-19 DIAGNOSIS — E11.9 TYPE 2 DIABETES MELLITUS WITHOUT COMPLICATION, WITHOUT LONG-TERM CURRENT USE OF INSULIN (H): ICD-10-CM

## 2020-11-23 ENCOUNTER — HOSPITAL ENCOUNTER (OUTPATIENT)
Dept: MAMMOGRAPHY | Facility: CLINIC | Age: 60
Discharge: HOME | End: 2020-11-23
Attending: FAMILY MEDICINE

## 2020-11-23 DIAGNOSIS — Z12.31 VISIT FOR SCREENING MAMMOGRAM: ICD-10-CM

## 2020-12-13 ENCOUNTER — COMMUNICATION - HEALTHEAST (OUTPATIENT)
Dept: FAMILY MEDICINE | Facility: CLINIC | Age: 60
End: 2020-12-13

## 2020-12-13 DIAGNOSIS — E11.9 TYPE 2 DIABETES MELLITUS WITHOUT COMPLICATION, WITHOUT LONG-TERM CURRENT USE OF INSULIN (H): ICD-10-CM

## 2020-12-13 DIAGNOSIS — E78.5 HYPERLIPIDEMIA: ICD-10-CM

## 2020-12-14 RX ORDER — SIMVASTATIN 40 MG
TABLET ORAL
Qty: 90 TABLET | Refills: 3 | Status: SHIPPED | OUTPATIENT
Start: 2020-12-14 | End: 2021-11-18

## 2021-01-20 ENCOUNTER — COMMUNICATION - HEALTHEAST (OUTPATIENT)
Dept: FAMILY MEDICINE | Facility: CLINIC | Age: 61
End: 2021-01-20

## 2021-01-20 DIAGNOSIS — E11.9 TYPE 2 DIABETES MELLITUS WITHOUT COMPLICATION, WITHOUT LONG-TERM CURRENT USE OF INSULIN (H): ICD-10-CM

## 2021-01-20 DIAGNOSIS — B00.1 RECURRENT COLD SORES: ICD-10-CM

## 2021-02-09 ENCOUNTER — COMMUNICATION - HEALTHEAST (OUTPATIENT)
Dept: FAMILY MEDICINE | Facility: CLINIC | Age: 61
End: 2021-02-09

## 2021-02-09 ENCOUNTER — COMMUNICATION - HEALTHEAST (OUTPATIENT)
Dept: SCHEDULING | Facility: CLINIC | Age: 61
End: 2021-02-09

## 2021-02-09 ENCOUNTER — OFFICE VISIT - HEALTHEAST (OUTPATIENT)
Dept: FAMILY MEDICINE | Facility: CLINIC | Age: 61
End: 2021-02-09

## 2021-02-09 DIAGNOSIS — E11.65 TYPE 2 DIABETES MELLITUS WITH HYPERGLYCEMIA, WITHOUT LONG-TERM CURRENT USE OF INSULIN (H): ICD-10-CM

## 2021-02-11 ENCOUNTER — COMMUNICATION - HEALTHEAST (OUTPATIENT)
Dept: FAMILY MEDICINE | Facility: CLINIC | Age: 61
End: 2021-02-11

## 2021-02-11 ENCOUNTER — AMBULATORY - HEALTHEAST (OUTPATIENT)
Dept: LAB | Facility: CLINIC | Age: 61
End: 2021-02-11

## 2021-02-11 DIAGNOSIS — E11.9 TYPE 2 DIABETES MELLITUS WITHOUT COMPLICATION, WITHOUT LONG-TERM CURRENT USE OF INSULIN (H): ICD-10-CM

## 2021-02-11 LAB
HBA1C MFR BLD: 8.3 %
TSH SERPL DL<=0.005 MIU/L-ACNC: 1 UIU/ML (ref 0.3–5)

## 2021-02-18 ENCOUNTER — COMMUNICATION - HEALTHEAST (OUTPATIENT)
Dept: FAMILY MEDICINE | Facility: CLINIC | Age: 61
End: 2021-02-18

## 2021-02-19 ENCOUNTER — COMMUNICATION - HEALTHEAST (OUTPATIENT)
Dept: FAMILY MEDICINE | Facility: CLINIC | Age: 61
End: 2021-02-19

## 2021-02-19 DIAGNOSIS — E11.9 TYPE 2 DIABETES MELLITUS WITHOUT COMPLICATION, WITHOUT LONG-TERM CURRENT USE OF INSULIN (H): ICD-10-CM

## 2021-02-22 ENCOUNTER — COMMUNICATION - HEALTHEAST (OUTPATIENT)
Dept: FAMILY MEDICINE | Facility: CLINIC | Age: 61
End: 2021-02-22

## 2021-02-22 DIAGNOSIS — E11.9 TYPE 2 DIABETES MELLITUS WITHOUT COMPLICATION, WITHOUT LONG-TERM CURRENT USE OF INSULIN (H): ICD-10-CM

## 2021-02-24 ENCOUNTER — OFFICE VISIT - HEALTHEAST (OUTPATIENT)
Dept: EDUCATION SERVICES | Facility: CLINIC | Age: 61
End: 2021-02-24

## 2021-02-24 DIAGNOSIS — E11.9 TYPE 2 DIABETES MELLITUS WITHOUT COMPLICATION, WITHOUT LONG-TERM CURRENT USE OF INSULIN (H): ICD-10-CM

## 2021-02-24 RX ORDER — INSULIN GLARGINE 100 [IU]/ML
16 INJECTION, SOLUTION SUBCUTANEOUS DAILY
Qty: 15 ML | Refills: 1 | Status: SHIPPED | OUTPATIENT
Start: 2021-02-24 | End: 2021-12-28

## 2021-02-26 ENCOUNTER — COMMUNICATION - HEALTHEAST (OUTPATIENT)
Dept: FAMILY MEDICINE | Facility: CLINIC | Age: 61
End: 2021-02-26

## 2021-03-09 ENCOUNTER — COMMUNICATION - HEALTHEAST (OUTPATIENT)
Dept: FAMILY MEDICINE | Facility: CLINIC | Age: 61
End: 2021-03-09

## 2021-03-11 ENCOUNTER — COMMUNICATION - HEALTHEAST (OUTPATIENT)
Dept: FAMILY MEDICINE | Facility: CLINIC | Age: 61
End: 2021-03-11

## 2021-03-15 ENCOUNTER — COMMUNICATION - HEALTHEAST (OUTPATIENT)
Dept: FAMILY MEDICINE | Facility: CLINIC | Age: 61
End: 2021-03-15

## 2021-03-17 ENCOUNTER — OFFICE VISIT - HEALTHEAST (OUTPATIENT)
Dept: FAMILY MEDICINE | Facility: CLINIC | Age: 61
End: 2021-03-17

## 2021-03-17 DIAGNOSIS — E11.65 TYPE 2 DIABETES MELLITUS WITH HYPERGLYCEMIA, WITH LONG-TERM CURRENT USE OF INSULIN (H): ICD-10-CM

## 2021-03-17 DIAGNOSIS — Z79.4 TYPE 2 DIABETES MELLITUS WITH HYPERGLYCEMIA, WITH LONG-TERM CURRENT USE OF INSULIN (H): ICD-10-CM

## 2021-03-23 ENCOUNTER — RECORDS - HEALTHEAST (OUTPATIENT)
Dept: ADMINISTRATIVE | Facility: OTHER | Age: 61
End: 2021-03-23

## 2021-04-01 ENCOUNTER — COMMUNICATION - HEALTHEAST (OUTPATIENT)
Dept: FAMILY MEDICINE | Facility: CLINIC | Age: 61
End: 2021-04-01

## 2021-04-01 DIAGNOSIS — E03.9 HYPOTHYROIDISM, UNSPECIFIED TYPE: ICD-10-CM

## 2021-04-02 RX ORDER — LEVOTHYROXINE SODIUM 112 UG/1
TABLET ORAL
Qty: 90 TABLET | Refills: 3 | Status: SHIPPED | OUTPATIENT
Start: 2021-04-02 | End: 2022-03-30

## 2021-05-10 ENCOUNTER — RECORDS - HEALTHEAST (OUTPATIENT)
Dept: ADMINISTRATIVE | Facility: OTHER | Age: 61
End: 2021-05-10

## 2021-05-14 ENCOUNTER — COMMUNICATION - HEALTHEAST (OUTPATIENT)
Dept: FAMILY MEDICINE | Facility: CLINIC | Age: 61
End: 2021-05-14

## 2021-05-14 DIAGNOSIS — E11.9 TYPE 2 DIABETES MELLITUS WITHOUT COMPLICATION, WITHOUT LONG-TERM CURRENT USE OF INSULIN (H): ICD-10-CM

## 2021-05-26 NOTE — TELEPHONE ENCOUNTER
Refill Approved    Rx renewed per Medication Renewal Policy. Medication was last renewed on 9/13/18.    Danielle Veloz, [facility] Triage/Med Refill 3/22/2019     Requested Prescriptions   Pending Prescriptions Disp Refills     glipiZIDE (GLUCOTROL) 10 MG tablet [Pharmacy Med Name: glipiZIDE Oral Tablet 10 MG] 360 tablet 0     Sig: TAKE 2 TABLETS BY MOUTH TWICE DAILY BEFORE MEALS    Oral Hypoglycemics Refill Protocol Passed - 3/20/2019  7:24 PM       Passed - Visit with PCP or prescribing provider visit in last 6 months      Last office visit with prescriber/PCP: Visit date not found OR same dept: Visit date not found OR same specialty: 10/18/2017 Carol Chavez MD Last physical: 12/5/2018 Last MTM visit: Visit date not found         Next appt within 3 mo: Visit date not found  Next physical within 3 mo: Visit date not found  Prescriber OR PCP: Carol Chavez MD  Last diagnosis associated with med order: 1. Diabetes mellitus, type 2 (H)  - glipiZIDE (GLUCOTROL) 10 MG tablet [Pharmacy Med Name: glipiZIDE Oral Tablet 10 MG]; TAKE 2 TABLETS BY MOUTH TWICE DAILY BEFORE MEALS  Dispense: 360 tablet; Refill: 0     If protocol passes may refill for 12 months if within 3 months of last provider visit (or a total of 15 months).          Passed - A1C in last 6 months    Hemoglobin A1c   Date Value Ref Range Status   12/05/2018 7.9 (H) 3.5 - 6.0 % Final              Passed - Microalbumin in last year     Microalbumin, Random Urine   Date Value Ref Range Status   12/05/2018 1.21 0.00 - 1.99 mg/dL Final                 Passed - Blood pressure in last year    BP Readings from Last 1 Encounters:   12/05/18 118/74            Passed - Serum creatinine in last year    Creatinine   Date Value Ref Range Status   12/05/2018 0.84 0.60 - 1.10 mg/dL Final

## 2021-05-27 NOTE — PROGRESS NOTES
Preoperative Exam    Scheduled Procedure: carpal tunnel, trigger finger  Surgery Date:  4/19/19  Surgery Location: [facility] [place]    Surgeon:  Dr. Schulz    Assessment/Plan:     1. Preoperative examination/Carpal tunnel syndrome of right wrist/Trigger finger, acquired/De Quervain's disease (radial styloid tenosynovitis)  Low risk surgery, per patient plan is for local with MAC anesthesia.  Patient is low risk for complications related to planned procedure, would recommend proceed as scheduled without further clinical clarification.  - Hemoglobin    2. Type 2 diabetes mellitus without complication, without long-term current use of insulin (H)  A1c remains elevated above goal range.  Suggested basal insulin to patient in addition to her current regimen.  We can start this after her upcoming surgery.  - Basic Metabolic Panel  - Glycosylated Hemoglobin A1c     Surgical Procedure Risk: Low (reported cardiac risk generally < 1%)  Have you had prior anesthesia?: Yes  Have you or any family members had a previous anesthesia reaction:  Yes: sister had urinary retention after anesthesia  Do you or any family members have a history of a clotting or bleeding disorder?: No  Cardiac Risk Assessment: no increased risk for major cardiac complications    Patient approved for surgery with general or local anesthesia.        Functional Status: Independent  Patient plans to recover at home with family.     Subjective:      Jeannine Rasheed is a 58 y.o. female who presents for a preoperative consultation.  Has had carpal tunnel for years in the right wrist.  Started with trigger finger about 1 year ago in the 3rd finger, now having some pain along the radial side of the thumb.  Scheduled to have all the issues addressed at the same procedure.  In terms of patient's diabetes, she brings in 30 sugar readings from 1/1/19 to present day.  All but 6 fasting blood sugars are above goal range.  She only brings in 15 pre-meal blood  sugars, half of which are above goal range.  Sugars over the past 2 months have been extremely high, greater than 200 for the most part.    All other systems reviewed and are negative, other than those listed in the HPI.    Pertinent History  Do you have difficulty breathing or chest pain after walking up a flight of stairs: No  History of obstructive sleep apnea: No  Steroid use in the last 6 months: No  Frequent Aspirin/NSAID use: No  Prior Blood Transfusion: No  Prior Blood Transfusion Reaction: No  If for some reason prior to, during or after the procedure, if it is medically indicated, would you be willing to have a blood transfusion?:  There is no transfusion refusal.    Current Outpatient Medications   Medication Sig Dispense Refill     aspirin 81 MG EC tablet Take 81 mg by mouth daily.       glipiZIDE (GLUCOTROL) 10 MG tablet TAKE 2 TABLETS BY MOUTH TWICE DAILY BEFORE MEALS 360 tablet 2     levothyroxine (SYNTHROID, LEVOTHROID) 125 MCG tablet take 1 tablet by mouth once daily at 6:00 am. 90 tablet 3     metFORMIN (GLUCOPHAGE) 1000 MG tablet take 1 tablet by mouth twice daily with meals 180 tablet 0     ONETOUCH ULTRA TEST strips Use 1 each As Directed 3 (three) times a day. 100 strip 11     simvastatin (ZOCOR) 40 MG tablet TAKE 1 TABLET BY MOUTH NIGHTLY AT BEDTIME 90 tablet 3     TRULICITY 1.5 mg/0.5 mL PnIj INJECT 1.5 MG UNDER THE SKIN ONCE A WEEK. 2 mL 5     UNABLE TO FIND once daily. Med Name: [employer] ActivVit vitamin       No current facility-administered medications for this visit.         No Known Allergies    Patient Active Problem List   Diagnosis     Hypothyroidism     Familial hypercholesterolemia     Anemia     Type 2 diabetes mellitus without complication, without long-term current use of insulin (H)       No past medical history on file.    Past Surgical History:   Procedure Laterality Date     ANKLE FRACTURE SURGERY Right 2005     [place] CORRJ HALLUX VALGUS W/SESMDC W/2 OSTEOT      Description:  "Hallux Valgus (Bunion) Correction;  Recorded: 10/29/2013;  Comments: age 13     [place] REVISE MEDIAN N/CARPAL TUNNEL SURG      Description: Neuroplasty Decompression Median Nerve At Carpal Tunnel;  Recorded: 10/26/2010;       Social History     Socioeconomic History     Marital status:      Spouse name: Not on file     Number of children: Not on file     Years of education: Not on file     Highest education level: Not on file   Occupational History     Not on file   Social Needs     Financial resource strain: Not on file     Food insecurity:     Worry: Not on file     Inability: Not on file     Transportation needs:     Medical: Not on file     Non-medical: Not on file   Tobacco Use     Smoking status: Never Smoker     Smokeless tobacco: Never Used   Substance and Sexual Activity     Alcohol use: Yes     Alcohol/week: 0.0 - 3.6 oz     Drug use: Not on file     Sexual activity: Yes     Partners: Male     Comment:  Isaias Christopher     Physical activity:     Days per week: Not on file     Minutes per session: Not on file     Stress: Not on file   Relationships     Social connections:     Talks on phone: Not on file     Gets together: Not on file     Attends Islam service: Not on file     Active member of club or organization: Not on file     Attends meetings of clubs or organizations: Not on file     Relationship status: Not on file     Intimate partner violence:     Fear of current or ex partner: Not on file     Emotionally abused: Not on file     Physically abused: Not on file     Forced sexual activity: Not on file   Other Topics Concern     Not on file   Social History Narrative     Not on file       Patient Care Team:  Carol Chavez MD as PCP - General          Objective:     Vitals:    04/03/19 1507   BP: 120/72   Pulse: 80   Resp: 16   Weight: 156 lb 6.4 oz (70.9 kg)   Height: 5' 0.8\" (1.544 m)         Physical Exam:  /72 (Patient Site: Left Arm, Patient Position: Sitting, Cuff Size: " "Adult Regular)   Pulse 80   Resp 16   Ht 5' 0.8\" (1.544 m)   Wt 156 lb 6.4 oz (70.9 kg)   Breastfeeding? No   BMI 29.75 kg/m      General Appearance:    Alert, cooperative, no distress, appears stated age   Head:    Normocephalic, without obvious abnormality, atraumatic   Eyes:    PERRL, conjunctiva/corneas clear, EOM's intact both eyes   Ears:    Normal TM's and external ear canals, both ears   Nose:   Nares normal, septum midline, mucosa normal, no drainage     or sinus tenderness   Throat:   Lips, mucosa, and tongue normal; teeth and gums normal   Neck:   Supple, symmetrical, trachea midline, no adenopathy;     thyroid:  no enlargement/tenderness/nodules; no carotid    bruit or JVD   Back:     Symmetric, no curvature, ROM normal, no CVA tenderness   Lungs:     Clear to auscultation bilaterally, respirations unlabored   Chest Wall:    No tenderness or deformity    Heart:    Regular rate and rhythm, S1 and S2 normal, no murmur, rub    or gallop   Breast Exam:    Not performed   Abdomen:     Soft, non-tender, bowel sounds active all four quadrants,     no masses, no organomegaly   Genitalia:    Not examined   Rectal:    Not examined   Extremities:   Extremities normal, atraumatic, no cyanosis or edema   Pulses:   2+ and symmetric all extremities   Skin:   Skin color, texture, turgor normal, no rashes or lesions   Lymph nodes:   Cervical, supraclavicular, and axillary nodes normal   Neurologic:   CNII-XII intact, normal strength, sensation and reflexes     throughout         There are no Patient Instructions on file for this visit.        Labs:  Recent Results (from the past 24 hour(s))   Hemoglobin    Collection Time: 04/03/19  4:05 PM   Result Value Ref Range    Hemoglobin 11.1 (L) 12.0 - 16.0 g/dL   Basic Metabolic Panel    Collection Time: 04/03/19  4:05 PM   Result Value Ref Range    Sodium 140 136 - 145 mmol/L    Potassium 4.5 3.5 - 5.0 mmol/L    Chloride 105 98 - 107 mmol/L    CO2 23 22 - 31 mmol/L    " Anion Gap, Calculation 12 5 - 18 mmol/L    Glucose 166 (H) 70 - 125 mg/dL    Calcium 10.2 8.5 - 10.5 mg/dL    BUN 15 8 - 22 mg/dL    Creatinine 0.84 0.60 - 1.10 mg/dL    GFR MDRD Af Amer >60 >60 mL/min/1.73m2    GFR MDRD Non Af Amer >60 >60 mL/min/1.73m2   Glycosylated Hemoglobin A1c    Collection Time: 04/03/19  4:05 PM   Result Value Ref Range    Hemoglobin A1c 7.6 (H) 3.5 - 6.0 %       Immunization History   Administered Date(s) Administered     Influenza, inj, historic,unspecified 10/05/2015, 10/10/2017     Tdap 09/22/2012           Electronically signed by Carol Chavez MD 04/03/19 3:09 PM

## 2021-05-27 NOTE — TELEPHONE ENCOUNTER
Requested Prescriptions     Pending Prescriptions Disp Refills     acyclovir (ZOVIRAX) 200 MG capsule       Sig: Take 1 capsule (200 mg total) by mouth.

## 2021-05-27 NOTE — TELEPHONE ENCOUNTER
Medication Request  Medication name: acyclovair  Pharmacy Name and Location:  [facility]  Reason for request:  Cold Sores  When did you use medication last?:   n/a  Patient offered appointment:  Patient was seen today  Okay to leave a detailed message: yes

## 2021-05-27 NOTE — TELEPHONE ENCOUNTER
YES.  When she starts to feel one coming on then she takes it.  5-8 times a day? She doesn't have old bottle.

## 2021-05-28 NOTE — TELEPHONE ENCOUNTER
RN cannot approve Refill Request    RN can NOT refill this medication Protocol failed and NO refill given.       Danielle Veloz, [facility] Triage/Med Refill 5/13/2019    Requested Prescriptions   Pending Prescriptions Disp Refills     metFORMIN (GLUCOPHAGE) 1000 MG tablet [Pharmacy Med Name: metFORMIN HCl Oral Tablet 1000 MG] 180 tablet 0     Sig: take 1 tablet by mouth twice daily with meals       Metformin Refill Protocol Failed - 5/12/2019  4:42 PM        Failed - LFT or AST or ALT in last 12 months     Albumin   Date Value Ref Range Status   01/18/2017 4.1 3.5 - 5.0 g/dL Final     Bilirubin, Total   Date Value Ref Range Status   01/18/2017 0.4 0.0 - 1.0 mg/dL Final     Alkaline Phosphatase   Date Value Ref Range Status   01/18/2017 51 45 - 120 U/L Final     AST   Date Value Ref Range Status   01/18/2017 42 (H) 0 - 40 U/L Final     ALT   Date Value Ref Range Status   01/18/2017 78 (H) 0 - 45 U/L Final     Protein, Total   Date Value Ref Range Status   01/18/2017 7.1 6.0 - 8.0 g/dL Final                Passed - Blood pressure in last 12 months     BP Readings from Last 1 Encounters:   04/03/19 120/72             Passed - GFR or Serum Creatinine in last 6 months     GFR MDRD Non Af Amer   Date Value Ref Range Status   04/03/2019 >60 >60 mL/min/1.73m2 Final     GFR MDRD Af Amer   Date Value Ref Range Status   04/03/2019 >60 >60 mL/min/1.73m2 Final             Passed - Visit with PCP or prescribing provider visit in last 6 months or next 3 months     Last office visit with prescriber/PCP: Visit date not found OR same dept: Visit date not found OR same specialty: 10/18/2017 Carol Chavez MD Last physical: Visit date not found Last MTM visit: Visit date not found         Next appt within 3 mo: Visit date not found  Next physical within 3 mo: Visit date not found  Prescriber OR PCP: Thalia Ovalle CNP  Last diagnosis associated with med order: 1. Type 2 diabetes mellitus without complication, without  long-term current use of insulin (H)  - metFORMIN (GLUCOPHAGE) 1000 MG tablet [Pharmacy Med Name: metFORMIN HCl Oral Tablet 1000 MG]; take 1 tablet by mouth twice daily with meals  Dispense: 180 tablet; Refill: 0     If protocol passes may refill for 12 months if within 3 months of last provider visit (or a total of 15 months).           Passed - A1C in last 6 months     Hemoglobin A1c   Date Value Ref Range Status   04/03/2019 7.6 (H) 3.5 - 6.0 % Final               Passed - Microalbumin in last year      Microalbumin, Random Urine   Date Value Ref Range Status   12/05/2018 1.21 0.00 - 1.99 mg/dL Final

## 2021-05-29 NOTE — TELEPHONE ENCOUNTER
Refill Approved    Rx renewed per Medication Renewal Policy. Medication was last renewed on 12/5/18    Jo Galicia, [facility] Triage/Med Refill 5/24/2019     Requested Prescriptions   Pending Prescriptions Disp Refills     TRULICITY 1.5 mg/0.5 mL PnIj [Pharmacy Med Name: Trulicity Subcutaneous Solution Pen-injector 1.5 MG/0.5ML] 2 mL 4     Sig: INJECT 1.5 MG UNDER THE SKIN ONCE A WEEK.       Insulin/GLP-1 Refill Protocol Passed - 5/23/2019  7:00 AM        Passed - Visit with PCP or prescribing provider visit in last 6 months     Last office visit with prescriber/PCP: Visit date not found OR same dept: Visit date not found OR same specialty: 10/18/2017 Carol Chavez MD Last physical: 4/3/2019 Last MTM visit: Visit date not found     Next appt within 3 mo: Visit date not found  Next physical within 3 mo: Visit date not found  Prescriber OR PCP: Carol Chavez MD  Last diagnosis associated with med order: 1. Type 2 diabetes mellitus without complication, without long-term current use of insulin (H)  - TRULICITY 1.5 mg/0.5 mL PnIj [Pharmacy Med Name: Trulicity Subcutaneous Solution Pen-injector 1.5 MG/0.5ML]; INJECT 1.5 MG UNDER THE SKIN ONCE A WEEK.  Dispense: 2 mL; Refill: 4    If protocol passes may refill for 6 months if within 3 months of last provider visit (or a total of 9 months).              Passed - A1C in last 6 months     Hemoglobin A1c   Date Value Ref Range Status   04/03/2019 7.6 (H) 3.5 - 6.0 % Final               Passed - Microalbumin in last year     Microalbumin, Random Urine   Date Value Ref Range Status   12/05/2018 1.21 0.00 - 1.99 mg/dL Final                  Passed - Blood pressure in last year     BP Readings from Last 1 Encounters:   04/03/19 120/72             Passed - Creatinine done in last year     Creatinine   Date Value Ref Range Status   04/03/2019 0.84 0.60 - 1.10 mg/dL Final

## 2021-05-30 VITALS — HEIGHT: 61 IN | BODY MASS INDEX: 30.93 KG/M2 | WEIGHT: 163.8 LBS

## 2021-05-30 VITALS — WEIGHT: 164.9 LBS | BODY MASS INDEX: 31.16 KG/M2

## 2021-05-31 VITALS — BODY MASS INDEX: 30.29 KG/M2 | WEIGHT: 160.3 LBS

## 2021-05-31 VITALS — BODY MASS INDEX: 30.26 KG/M2 | HEIGHT: 61 IN | WEIGHT: 160.3 LBS

## 2021-05-31 VITALS — BODY MASS INDEX: 29.64 KG/M2 | HEIGHT: 61 IN | WEIGHT: 157 LBS

## 2021-05-31 NOTE — TELEPHONE ENCOUNTER
RN cannot approve Refill Request    RN can NOT refill this medication Protocol failed and NO refill given. Last office visit: 10/18/2017 Carol Chavez MD Last Physical: 4/3/2019 Last MTM visit: Visit date not found Last visit same specialty: 10/18/2017 Carol Chavez MD.  Next visit within 3 mo: Visit date not found  Next physical within 3 mo: Visit date not found      Kenya Raymundo, [facility] Triage/Med Refill 8/10/2019    Requested Prescriptions   Pending Prescriptions Disp Refills     metFORMIN (GLUCOPHAGE) 1000 MG tablet [Pharmacy Med Name: metFORMIN HCl Oral Tablet 1000 MG] 180 tablet 0     Sig: take 1 tablet by mouth twice daily with meals       Metformin Refill Protocol Failed - 8/9/2019  7:00 AM        Failed - LFT or AST or ALT in last 12 months     Albumin   Date Value Ref Range Status   01/18/2017 4.1 3.5 - 5.0 g/dL Final     Bilirubin, Total   Date Value Ref Range Status   01/18/2017 0.4 0.0 - 1.0 mg/dL Final     Alkaline Phosphatase   Date Value Ref Range Status   01/18/2017 51 45 - 120 U/L Final     AST   Date Value Ref Range Status   01/18/2017 42 (H) 0 - 40 U/L Final     ALT   Date Value Ref Range Status   01/18/2017 78 (H) 0 - 45 U/L Final     Protein, Total   Date Value Ref Range Status   01/18/2017 7.1 6.0 - 8.0 g/dL Final                Passed - Blood pressure in last 12 months     BP Readings from Last 1 Encounters:   04/03/19 120/72             Passed - GFR or Serum Creatinine in last 6 months     GFR MDRD Non Af Amer   Date Value Ref Range Status   04/03/2019 >60 >60 mL/min/1.73m2 Final     GFR MDRD Af Amer   Date Value Ref Range Status   04/03/2019 >60 >60 mL/min/1.73m2 Final             Passed - Visit with PCP or prescribing provider visit in last 6 months or next 3 months     Last office visit with prescriber/PCP: Visit date not found OR same dept: Visit date not found OR same specialty: 10/18/2017 Carol Chavez MD Last physical: 4/3/2019 Last MTM visit: Visit  date not found         Next appt within 3 mo: Visit date not found  Next physical within 3 mo: Visit date not found  Prescriber OR PCP: Carol Chavez MD  Last diagnosis associated with med order: 1. Type 2 diabetes mellitus without complication, without long-term current use of insulin (H)  - metFORMIN (GLUCOPHAGE) 1000 MG tablet [Pharmacy Med Name: metFORMIN HCl Oral Tablet 1000 MG]; take 1 tablet by mouth twice daily with meals  Dispense: 180 tablet; Refill: 0     If protocol passes may refill for 12 months if within 3 months of last provider visit (or a total of 15 months).           Passed - A1C in last 6 months     Hemoglobin A1c   Date Value Ref Range Status   04/03/2019 7.6 (H) 3.5 - 6.0 % Final               Passed - Microalbumin in last year      Microalbumin, Random Urine   Date Value Ref Range Status   12/05/2018 1.21 0.00 - 1.99 mg/dL Final

## 2021-05-31 NOTE — TELEPHONE ENCOUNTER
Who is requesting the letter?  Patient  Why is the letter needed? She is traveling to [place].  She needs a letter noting all the medications she will be carrying with her on the airplane and while traveling.  She does need this letter today.  She apologizes for the lateness as she just thought about this this morning.  Please have doctor sign and date the letter as well.  How would you like this letter returned? MyChart.  Okay to leave a detailed message? Yes

## 2021-06-02 ENCOUNTER — COMMUNICATION - HEALTHEAST (OUTPATIENT)
Dept: FAMILY MEDICINE | Facility: CLINIC | Age: 61
End: 2021-06-02

## 2021-06-02 VITALS — HEIGHT: 61 IN | WEIGHT: 157.9 LBS | BODY MASS INDEX: 29.81 KG/M2

## 2021-06-02 VITALS — BODY MASS INDEX: 29.53 KG/M2 | WEIGHT: 156.4 LBS | HEIGHT: 61 IN

## 2021-06-02 DIAGNOSIS — E11.9 TYPE 2 DIABETES MELLITUS WITHOUT COMPLICATION, WITHOUT LONG-TERM CURRENT USE OF INSULIN (H): ICD-10-CM

## 2021-06-02 NOTE — TELEPHONE ENCOUNTER
Patient states she is out of test strips over a month requesting to process as soon as possible .  Refill Request  Did you contact pharmacy: No  Medication name:   Requested Prescriptions     Pending Prescriptions Disp Refills     blood glucose test (ONETOUCH ULTRA TEST) strips 100 strip 11     Sig: Use 1 each As Directed 3 (three) times a day.   Who prescribed the medication: Carol Chavez MD  Pharmacy Name and Location: [id number]  Is patient out of medication: Yes  Patient notified refills processed in 72 hours:  yes  Okay to leave a detailed message: no

## 2021-06-02 NOTE — TELEPHONE ENCOUNTER
Please remind patient that she is due for diabetes follow-up if she does not already have this scheduled.

## 2021-06-03 ENCOUNTER — COMMUNICATION - HEALTHEAST (OUTPATIENT)
Dept: FAMILY MEDICINE | Facility: CLINIC | Age: 61
End: 2021-06-03

## 2021-06-03 ENCOUNTER — AMBULATORY - HEALTHEAST (OUTPATIENT)
Dept: FAMILY MEDICINE | Facility: CLINIC | Age: 61
End: 2021-06-03

## 2021-06-03 ENCOUNTER — OFFICE VISIT - HEALTHEAST (OUTPATIENT)
Dept: FAMILY MEDICINE | Facility: CLINIC | Age: 61
End: 2021-06-03

## 2021-06-03 ENCOUNTER — RECORDS - HEALTHEAST (OUTPATIENT)
Dept: ADMINISTRATIVE | Facility: OTHER | Age: 61
End: 2021-06-03

## 2021-06-03 VITALS
DIASTOLIC BLOOD PRESSURE: 77 MMHG | OXYGEN SATURATION: 99 % | HEART RATE: 83 BPM | BODY MASS INDEX: 29.69 KG/M2 | SYSTOLIC BLOOD PRESSURE: 129 MMHG | TEMPERATURE: 97.7 F | WEIGHT: 156.1 LBS

## 2021-06-03 DIAGNOSIS — Z79.4 TYPE 2 DIABETES MELLITUS WITH HYPERGLYCEMIA, WITH LONG-TERM CURRENT USE OF INSULIN (H): ICD-10-CM

## 2021-06-03 DIAGNOSIS — E11.65 TYPE 2 DIABETES MELLITUS WITH HYPERGLYCEMIA, WITH LONG-TERM CURRENT USE OF INSULIN (H): ICD-10-CM

## 2021-06-03 LAB
CREAT UR-MCNC: 100.9 MG/DL
HBA1C MFR BLD: 6.9 %
MICROALBUMIN UR-MCNC: 0.78 MG/DL (ref 0–1.99)
MICROALBUMIN/CREAT UR: 7.7 MG/G

## 2021-06-03 RX ORDER — SEMAGLUTIDE 1.34 MG/ML
0.5 INJECTION, SOLUTION SUBCUTANEOUS
Qty: 3 SYRINGE | Refills: 3 | Status: SHIPPED | OUTPATIENT
Start: 2021-06-03 | End: 2022-07-12

## 2021-06-03 ASSESSMENT — MIFFLIN-ST. JEOR: SCORE: 1167.48

## 2021-06-03 NOTE — PATIENT INSTRUCTIONS - HE
1. Keep well hydrated  2.  May alternate Tylenol every 6 hours with ibuprofen every 6 hours as needed for fever or pain  3.  Keep your follow up appointment  4. If you have any questions, call the clinic number - it's answered 24/7      Patient Education     Acute Bacterial Rhinosinusitis (ABRS)    Acute bacterial rhinosinusitis (ABRS) is an infection of your nasal cavity and sinuses. It s caused by bacteria. Acute means that you ve had symptoms for less than 4 weeks, but possibly up to 12 weeks.  Understanding your sinuses  The nasal cavity is the large air-filled space behind your nose. The sinuses are a group of spaces formed by the bones of your face. They connect with your nasal cavity. ABRS causes the tissue lining these spaces to become inflamed. Mucus may not drain normally. This leads to facial pain and other symptoms.  What causes ABRS?  ABRS most often follows an upper respiratory infection caused by a virus. Bacteria then infect the lining of your nasal cavity and sinuses. But you can also get ABRS if you have:    Nasal allergies    Long-term nasal swelling and congestion not caused by allergies    Blockage in the nose  Symptoms of ABRS  The symptoms of ABRS may be different for each person and include:    Nasal congestion or blockage    Pain or pressure in the face    Thick, colored drainage from the nose  Other symptoms may include:    Runny nose    Fluid draining from the nose down the throat (postnasal drip)    Headache    Cough    Pain    Fever  Diagnosing ABRS  ABRS may be diagnosed if you ve had an upper respiratory infection like a cold and cough for 10 or more days without improvement or with worsening symptoms. Your healthcare provider will ask about your symptoms and your medical history. The provider will check your vital signs, including your temperature. You ll have a physical exam. The healthcare provider will check your ears, nose, and throat. You likely won t need any tests. If ABRS  comes back, you may have a culture or other tests.  Treatment for ABRS  Treatment may include:    Antibiotic medicine. This is for symptoms that last for at least 10 to 14 days.    Nasal corticosteroid medicine. Drops or spray used in the nose can lessen swelling and congestion.    Over-the-counter pain medicine. This is to lessen sinus pain and pressure.    Nasal decongestant medicine. Spray or drops may help to lessen congestion. Do not use them for more than a few days.    Salt wash (saline irrigation). This can help to loosen mucus.  Possible complications of ABRS  ABRS may come back or become long-term (chronic). In rare cases, ABRS may cause complications such as:     Inflamed tissue around the brain and spinal cord (meningitis)    Inflamed tissue around the eyes (orbital cellulitis)    Inflamed bones around the sinuses (osteitis)  These problems may need to be treated in a hospital with intravenous (IV) antibiotic medicine or surgery.  When to call the healthcare provider  Call your healthcare provider if you have any of the following:    Symptoms that don t get better, or get worse    Symptoms that don t get better after 3 to 5 days on antibiotics    Trouble seeing    Swelling around your eyes    Confusion or trouble staying awake   Date Last Reviewed: 5/1/2017 2000-2019 [employer]. [address] [place] [address], [place] [address]. All rights reserved. This information is not intended as a substitute for professional medical care. Always follow your healthcare professional's instructions.

## 2021-06-03 NOTE — TELEPHONE ENCOUNTER
Has had cold x 8 days.    Has had a sore throat for 8 days.  Feels like.she may have strep throat, and wants to be checked for it today.    Patient state she would like to go to [facility], and there were no openings there today.  aptient was advised to go to [facility], and [facility].  Patient has decided to go to [facility]. Today.    Maya Sears RN  [facility] Triage/refill nurse    Reason for Disposition    Patient wants to be seen    Protocols used: SORE THROAT-A-OH

## 2021-06-03 NOTE — PATIENT INSTRUCTIONS - HE
1. Eat 3 balanced meals each day - Monitor carb intake and limit to 45-60 grams per meal  This would be equal to 3-4 choices ~  1 choice = 15 grams    Do not wait longer than 4-5 hours to eat something  Snacks limit to no more than 30 grams of carbohydrates or 2 choices  Make sure you include protein source with each meal and at bedtime - this has been shown to help with blood glucose elevations    2. Check blood sugars 2  times each day  - when you wake up   - 2 hours AFTER dinner    Fasting and before meal target is 80 - 130   2 hours after a meal target is < 180  remember to bring meter and log book to all appointments    3. Incorporate 30 minutes activity into each day - does not need to be all at one time & walking counts    4. Take diabetes medications as prescribed Continue Metformin 1000 mg PO twice daily, Glipizide 20 mg , Trulicity 1.5 mg   Check on Jardiance, Ozempic ,Tresiba for coverage     Meet me at [place] on Monday after work

## 2021-06-03 NOTE — PROGRESS NOTES
CGM placement  Reviewed how sensor works, checking interstitial glucose every five minutes.  Reviewed how information helps us make better treatment decisions.  Reviewed keeping food logs and continuing to check glucose as usual.  All additional questions and concerns addressed.  Sensor placed on upper left arm, and covered with medical tape.  No additional adhesive used.  Lot number-[id number]  Serial number-[id number]  Expiration date-4/30/2020  Follow-up appointment scheduled for 12/30/19 - pt will drop off sensor for download prior to FU appt  Discussed calling if sensor falls off less than 5 days after placement.      Thank you,  Norah Felix RN CDE  Diabetes Care

## 2021-06-03 NOTE — PROGRESS NOTES
Assessment/Plan:       1. Type 2 diabetes mellitus without complication, without long-term current use of insulin (H)  Recommended meeting with diabetic education to learn about and get started on long-acting insulin.  I will connect with our diabetic educator to see if she would like me to prescribe this in advance of the visit.  She will continue her other medication regimen for now.  She will also check twice daily, daily morning fasting sugars along with one pre-meal sugar, alternating meals.  She will bring these readings to her meeting with the diabetic educator.  We can then decide when to see her back in person.  - Glycosylated Hemoglobin A1c    2. Iron deficiency anemia, unspecified iron deficiency anemia type  Plan recheck hemoglobin and iron levels today.  Patient is postmenopausal.  - HM2(CBC w/o Differential)  - Iron and Transferrin Iron Binding Capacity    3. Familial hypercholesterolemia  Continues on simvastatin and tolerates this well.  Previous lipid profile was within goal range.  - Lipid Cascade FASTING    4. Hypothyroidism, unspecified type  Plan update TSH today with above labs.  Addendum: Patient's TSH returned low.  Will adjust dose to 112 mcg daily and plan recheck in 4 to 6 weeks.  - Thyroid Stimulating Hormone (TSH)        Subjective:       Jeannine Rasheed is a 59 y.o. female who presents for primarily follow-up of type 2 diabetes mellitus.  I last saw patient in April of this year for preop exam.  I had recommended follow-up to discuss her diabetes and likely start her on long-acting insulin.  She brings in sugar readings for 10 days today.  She states she ran out of test strips and was not testing previously.  Her morning fasting sugars range from .  She states that the evening prior to the 300 blood sugar, she had a hot fudge sundae.  Her pre-meal sugars range from 171-252.  She has been taking her medications as prescribed.  She gets lots of physical activity, walks on a  "treadmill 3 times weekly at work for half an hour.  She also does not exercise video with her daughter twice weekly.  She has no chest pain or shortness of breath with exertion.  She again questions whether she should have a stress test is because of her siblings have had coronary artery disease.  We again discussed that stress tests are not performed when patients are asymptomatic.  Over the past several days, she has had some dizziness with position changes, particularly rolling over in bed.  She describes this as \"lightheadedness\" not room spinning dizziness.  She has had a mild upper respiratory infection with some runny nose.  She has a mild sore throat, no fevers.  She has not been coughing.    Labs Reviewed:  Lab Results   Component Value Date    CHOL 163 12/05/2018    TRIG 178 (H) 12/05/2018    HDL 61 12/05/2018    LDLDIRECT 105 09/20/2011    ALT 78 (H) 01/18/2017    AST 42 (H) 01/18/2017     04/03/2019    K 4.5 04/03/2019     04/03/2019    CREATININE 0.84 04/03/2019    BUN 15 04/03/2019    CO2 23 04/03/2019    TSH 0.71 12/05/2018    HGBA1C 7.6 (H) 04/03/2019    MICROALBUR 1.21 12/05/2018         The following portions of the patient's history were reviewed and updated as appropriate: allergies, current medications, past family history, past medical history and problem list.      Current Outpatient Medications:      aspirin 81 MG EC tablet, Take 81 mg by mouth daily., Disp: , Rfl:      blood glucose test (ONETOUCH ULTRA TEST) strips, Use 1 each As Directed 3 (three) times a day., Disp: 100 strip, Rfl: 11     glipiZIDE (GLUCOTROL) 10 MG tablet, TAKE 2 TABLETS BY MOUTH TWICE DAILY BEFORE MEALS, Disp: 360 tablet, Rfl: 2     levothyroxine (SYNTHROID, LEVOTHROID) 125 MCG tablet, take 1 tablet by mouth once daily at 6:00 am., Disp: 90 tablet, Rfl: 3     metFORMIN (GLUCOPHAGE) 1000 MG tablet, take 1 tablet by mouth twice daily with meals, Disp: 180 tablet, Rfl: 0     simvastatin (ZOCOR) 40 MG tablet, " "TAKE 1 TABLET BY MOUTH NIGHTLY AT BEDTIME, Disp: 90 tablet, Rfl: 3     TRULICITY 1.5 mg/0.5 mL PnIj, INJECT 1.5 MG UNDER THE SKIN ONCE A WEEK., Disp: 2 mL, Rfl: 5     alum/mag hydrox-simethicone-diphenhydramine-lidocaine (MAGIC MOUTHWASH) suspension, Gargle and spit out 15 cc ( 1 tablespoon) every 4-6 hours, but no more than 4 doses in 24 hours, Disp: 120 mL, Rfl: 0    Review of Systems   A 12 point comprehensive review of systems was negative except as noted.      Objective:      /68 (Patient Site: Right Arm, Patient Position: Sitting, Cuff Size: Adult Regular)   Pulse 68   Resp 16   Ht 5' 0.8\" (1.544 m)   Wt 154 lb 4.8 oz (70 kg)   Breastfeeding No   BMI 29.35 kg/m      General appearance: alert, appears stated age and cooperative  Head: Normocephalic, without obvious abnormality, atraumatic  Eyes: Conjunctivae clear, sclerae anicteric  Ears: normal TM's and external ear canals both ears  Nose: Nares normal. Septum midline. Mucosa normal. No drainage or sinus tenderness.  Throat: Oral mucosa moist, posterior pharynx clear  Neck: no adenopathy, supple, symmetrical, trachea midline and thyroid not enlarged, symmetric, no tenderness/mass/nodules  Lungs: clear to auscultation bilaterally  Heart: regular rate and rhythm, S1, S2 normal, no murmur, click, rub or gallop  Neurologic: Grossly normal, monofilament foot exam normal bilaterally        Results for orders placed or performed in visit on 11/22/19   Glycosylated Hemoglobin A1c   Result Value Ref Range    Hemoglobin A1c 9.2 (H) 3.5 - 6.0 %   HM2(CBC w/o Differential)   Result Value Ref Range    WBC 5.0 4.0 - 11.0 thou/uL    RBC 4.66 3.80 - 5.40 mill/uL    Hemoglobin 11.2 (L) 12.0 - 16.0 g/dL    Hematocrit 35.8 35.0 - 47.0 %    MCV 77 (L) 80 - 100 fL    MCH 24.1 (L) 27.0 - 34.0 pg    MCHC 31.4 (L) 32.0 - 36.0 g/dL    RDW 14.4 11.0 - 14.5 %    Platelets 262 140 - 440 thou/uL    MPV 7.6 7.0 - 10.0 fL            "

## 2021-06-03 NOTE — PROGRESS NOTES
Provider wore a mask during this visit.   Subjective:   Jeannine Rasheed is a 59 y.o. female  Roomed by: JOSEPH Diaz    Refills needed? No    Do you have any forms that need to be filled out? No      Chief Complaint   Patient presents with     Sore Throat     Cough , ST x 8 days    Started coughing and sore throat on 10/26. She got her flu vaccine on 10/27. Denies any CP or shortness of breath. C/o right ear pain. Admits fevers and chills on and off. Admits runny and stuffy nose. Says her  and daughter had cold symptoms first.  Having headaches and dizziness. Says sense of smell is down. Appetite has been down. Has been drinking fluids and urinating the same. Denies any recent nausea, vomiting, but admits a little belly pain, or diarrhea.     Review of Systems  See HPI for ROS, otherwise balance of other systems negative    No Known Allergies    Current Outpatient Medications:      aspirin 81 MG EC tablet, Take 81 mg by mouth daily., Disp: , Rfl:      blood glucose test (ONETOUCH ULTRA TEST) strips, Use 1 each As Directed 3 (three) times a day., Disp: 100 strip, Rfl: 11     glipiZIDE (GLUCOTROL) 10 MG tablet, TAKE 2 TABLETS BY MOUTH TWICE DAILY BEFORE MEALS, Disp: 360 tablet, Rfl: 2     levothyroxine (SYNTHROID, LEVOTHROID) 125 MCG tablet, take 1 tablet by mouth once daily at 6:00 am., Disp: 90 tablet, Rfl: 3     metFORMIN (GLUCOPHAGE) 1000 MG tablet, take 1 tablet by mouth twice daily with meals, Disp: 180 tablet, Rfl: 0     simvastatin (ZOCOR) 40 MG tablet, TAKE 1 TABLET BY MOUTH NIGHTLY AT BEDTIME, Disp: 90 tablet, Rfl: 3     TRULICITY 1.5 mg/0.5 mL PnIj, INJECT 1.5 MG UNDER THE SKIN ONCE A WEEK., Disp: 2 mL, Rfl: 5  Patient Active Problem List   Diagnosis     Hypothyroidism     Familial hypercholesterolemia     Anemia     Type 2 diabetes mellitus without complication, without long-term current use of insulin (H)     No past medical history on file.      Objective:     Vitals:    11/04/19 1229   BP: 129/77    Patient Site: Right Arm   Patient Position: Sitting   Cuff Size: Adult Regular   Pulse: 83   Temp: 97.7  F (36.5  C)   TempSrc: Oral   SpO2: 99%   Weight: 156 lb 1.6 oz (70.8 kg)   Gen - Pt in NAD  Eyes - Conjunctiva non injected, no drainage  Face - non TTP over frontal sinus areas; non TTP over maxillary areas  Ears - external canals - no induration, Right TM - non injected, Left TM - non injected   Nose - not congested, no nasal drainage  Pharynx - not injected, tonsils 1+ size  Neck - supple, no cervical adenopathy, no masses  Cor - RRR w/o murmur  Lungs - Good air entry; no wheezes or crackles noted on auscultation - no coughing noted  Skin - no lesions, no rashes noted    Results for orders placed or performed in visit on 11/04/19   Rapid Strep A Screen-Throat swab   Result Value Ref Range    Rapid Strep A Antigen No Group A Strep detected, presumptive negative No Group A Strep detected, presumptive negative   Lab result discussed on day of visit.      Assessment - Plan   Medical Decision Making - Patient presents with  10 days of persistent cold symptoms with coughing.  Exam is within normal limits. Because the duration of patient's symptoms was 10 days or greater, criteria for acute sinusitis was made.  Will treat patient with doxycycline.  See Patient instructions for symptomatic management and follow-up.      1. Acute non-recurrent sinusitis, unspecified location  - doxycycline (MONODOX) 100 MG capsule; Take 1 capsule (100 mg total) by mouth 2 (two) times a day for 7 days.  Dispense: 14 capsule; Refill: 0    2. Throat pain  - alum/mag hydrox-simethicone-diphenhydramine-lidocaine (MAGIC MOUTHWASH) suspension; Gargle and spit out 15 cc ( 1 tablespoon) every 4-6 hours, but no more than 4 doses in 24 hours  Dispense: 120 mL; Refill: 0    3. Type 2 diabetes mellitus without complication, without long-term current use of insulin (H)    4. Sore throat  - Rapid Strep A Screen-Throat swab  - Group A Strep, RNA  Direct Detection, Throat      At the conclusion of the encounter, assessment and plan were discussed.   All questions were answered.   The patient or guardian acknowledged understanding and was involved in the decision making regarding the overall care plan.    There are no Patient Instructions on file for this visit.

## 2021-06-04 VITALS
DIASTOLIC BLOOD PRESSURE: 68 MMHG | WEIGHT: 154.3 LBS | HEIGHT: 61 IN | RESPIRATION RATE: 16 BRPM | BODY MASS INDEX: 29.13 KG/M2 | HEART RATE: 68 BPM | SYSTOLIC BLOOD PRESSURE: 124 MMHG

## 2021-06-04 VITALS — BODY MASS INDEX: 30.55 KG/M2 | WEIGHT: 160.6 LBS

## 2021-06-04 VITALS — WEIGHT: 154.4 LBS | BODY MASS INDEX: 29.37 KG/M2

## 2021-06-04 NOTE — TELEPHONE ENCOUNTER
RN cannot approve Refill Request    RN can NOT refill this medication PCP messaged that patient is overdue for Labs. Last office visit: 11/22/2019 Carol Chavez MD Last Physical: 4/3/2019 Last MTM visit: Visit date not found Last visit same specialty: 11/22/2019 Carol Chavez MD.  Next visit within 3 mo: Visit date not found  Next physical within 3 mo: Visit date not found      [facility] Triage/Med Refill 12/25/2019    Requested Prescriptions   Pending Prescriptions Disp Refills     glipiZIDE (GLUCOTROL) 10 MG tablet [Pharmacy Med Name: glipiZIDE Oral Tablet 10 MG] 360 tablet 1     Sig: TAKE 2 TABLETS BY MOUTH TWICE DAILY BEFORE MEALS       Oral Hypoglycemics Refill Protocol Failed - 12/23/2019  7:01 AM        Failed - Microalbumin in last year      Microalbumin, Random Urine   Date Value Ref Range Status   12/05/2018 1.21 0.00 - 1.99 mg/dL Final                  Passed - Visit with PCP or prescribing provider visit in last 6 months       Last office visit with prescriber/PCP: 11/22/2019 OR same dept: 11/22/2019 Carol Chavez MD OR same specialty: 11/22/2019 Carol Chavez MD Last physical: Visit date not found Last MTM visit: Visit date not found         Next appt within 3 mo: Visit date not found  Next physical within 3 mo: Visit date not found  Prescriber OR PCP: Carol Chavez MD  Last diagnosis associated with med order: 1. Diabetes mellitus, type 2 (H)  - glipiZIDE (GLUCOTROL) 10 MG tablet [Pharmacy Med Name: glipiZIDE Oral Tablet 10 MG]; TAKE 2 TABLETS BY MOUTH TWICE DAILY BEFORE MEALS  Dispense: 360 tablet; Refill: 1    2. Hyperlipidemia  - simvastatin (ZOCOR) 40 MG tablet; TAKE 1 TABLET BY MOUTH NIGHTLY AT BEDTIME  Dispense: 90 tablet; Refill: 3     If protocol passes may refill for 12 months if within 3 months of last provider visit (or a total of 15 months).           Passed - A1C in last 6 months     Hemoglobin A1c   Date Value Ref Range Status    11/22/2019 9.2 (H) 3.5 - 6.0 % Final               Passed - Blood pressure in last year     BP Readings from Last 1 Encounters:   11/22/19 124/68             Passed - Serum creatinine in last year     Creatinine   Date Value Ref Range Status   04/03/2019 0.84 0.60 - 1.10 mg/dL Final           Signed Prescriptions Disp Refills    simvastatin (ZOCOR) 40 MG tablet 90 tablet 3     Sig: TAKE 1 TABLET BY MOUTH NIGHTLY AT BEDTIME       Statins Refill Protocol (Hmg CoA Reductase Inhibitors) Passed - 12/23/2019  7:01 AM        Passed - PCP or prescribing provider visit in past 12 months      Last office visit with prescriber/PCP: 11/22/2019 Carol Chavez MD OR same dept: 11/22/2019 Carol Chavez MD OR same specialty: 11/22/2019 Carol Chavez MD  Last physical: 4/3/2019 Last MTM visit: Visit date not found   Next visit within 3 mo: Visit date not found  Next physical within 3 mo: Visit date not found  Prescriber OR PCP: Carol Chavez MD  Last diagnosis associated with med order: 1. Diabetes mellitus, type 2 (H)  - glipiZIDE (GLUCOTROL) 10 MG tablet [Pharmacy Med Name: glipiZIDE Oral Tablet 10 MG]; TAKE 2 TABLETS BY MOUTH TWICE DAILY BEFORE MEALS  Dispense: 360 tablet; Refill: 1    2. Hyperlipidemia  - simvastatin (ZOCOR) 40 MG tablet; TAKE 1 TABLET BY MOUTH NIGHTLY AT BEDTIME  Dispense: 90 tablet; Refill: 3    If protocol passes may refill for 12 months if within 3 months of last provider visit (or a total of 15 months).

## 2021-06-04 NOTE — PATIENT INSTRUCTIONS - HE
1. Eat 3 balanced meals each day - Monitor carb intake and limit to 45-60 grams per meal  This would be equal to 3-4 choices ~  1 choice = 15 grams    Do not wait longer than 4-5 hours to eat something  Snacks limit to no more than 30 grams of carbohydrates or 2 choices  Make sure you include protein source with each meal and at bedtime - this has been shown to help with blood glucose elevations    2. Check blood sugars at least 2  times each day  - when you wake up   - 2 hours AFTER dinner ( sometimes lunch)   Fasting and before meal target is 80 - 130   2 hours after a meal target is < 180  remember to bring meter and log book to all appointments    3. Incorporate 30 minutes activity into each day - does not need to be all at one time & walking counts    4. Take diabetes medications as prescribed Continue Metformin 2000 mg each day, Trulicity 1.5 mg weekly ( remember to rotate injection sites)   Will start Jardiance 10 mg each day and Glipizide 10 mg twice daily 30 minutes before eating       Send in your blood sugars each Saturday so we can we look them over and keep tabs on what is going on

## 2021-06-04 NOTE — PROGRESS NOTES
Assessment: Jeannine Rasheed is a  59 y.o.who presents today for a follow up to review and discuss results from CGM that was worn 12/2 - 12/16/2019 with A1c presently not at ADA goal of <7.0 Yesenia arrived alone today - did not bring her meter with her.. Per report, She has been checking  BG 1-2 times each day intermittently and states she is  taking all diabetes medications daily as prescribed with 2 recorded missed or skipped doses. Prior to todays appointment with Yesenia the [facility] CGM summary report was reviewed with her PCP Dr Chavez and adjunct therapies were discussed which included the addition of SGLT2's as well as basal insulin.    Current diabetes medications:  Metformin 2000 mg PO QD, Glipizide 20 mg PO BID, Trulicity 1.5 mg SC weekly   BG Summary: CGM glucose patterns summary for [facility] review professional-reports were labeled and scanned to records  December 2, 2019 through December 16, 2019    Average glucose 221 mg/dL  Time in range  75% above 180 mg/dL of that 28% was above 250 mg/dL  25% was found to be in target range 70 to 180 mg/dL  There was 0% of any hypoglycemic episodes-BG below 70 mg/dL    There were consistent notable elevations both post meal (lunch and dinner) also at bedtime.  Closest in range appear to be around 6 AM      Plan: Check blood sugars 2-3 times each day - Asked she check each morning and then again 2 hours after lunch/dinner , Yesenia was reminded to bring meter and log book to all follow up appointments for review. Eat three balanced meals each day following the parameters for intake for all meals and snacks from ADA guidelines. Keep active - goal being 30 minutes daily of moderate activity. Medications: Continue Metformin and Trulicity as prescribed - (both a maximum dose) After conferring with Dr Chavez it was decided at this time to add SGLT2 to regimen so 10 mg Jardiance PO QD was added. Will also decrease Glipizide dose by 50% as is recommended in IDC guidelines.   Follow up:  Yesenia has agreed to send in her BG readings weekly (Saturday) for review. She was instructed to call with any questions/concerns that may come up before then.    She was unsure today if she will be having any formulary changes with her current DM meds - specifically Trulicity was in question. Advised her that if she does have any such changes to contact me and I will work with her for covered alternatives.      Subjective and Objective:      Jeannine Rasheed has been referred by Dr. Chavez for Diabetes Education.     Lab Results   Component Value Date    HGBA1C 9.2 (H) 11/22/2019         Wt 160 lb 9.6 oz (72.8 kg)   BMI 30.55 kg/m      Goals        General      Medications      Take diabetes medications as prescribed   11/27/2019            Monitor       Check blood sugars 2  times each day until therapeutic goals are met  remember to bring meter and log book to all appointments   11/27/2019             Other      Nutrition       Eat 3 balanced meals each day - Monitor carb intake and limit to 3-4 choices (45-60 grams) per meal    Do not wait longer than 4-5 hours to eat something  SOMETIMES MET  1/25/2017  MOSTLY MET  10/23/2017  SOMETIMES MET 11/27/2019                    Follow up:   Primary care visit  CDE (certified diabetic educator)      Education:     Monitoring   Meter (per above goals): Assessed and Discussed  Monitoring: Assessed, Discussed and Literature provided  BG goals: Assessed, Discussed, Literature provided and Needs instruction/review at follow-up    Nutrition Management  Nutrition Management: Assessed and Discussed  Weight: Assessed and Discussed  Portions/Balance: Assessed and Discussed  Carb ID/Count: Discussed  Label Reading: Discussed  Heart Healthy Fats: Discussed  Menu Planning: Assessed and Discussed  Dining Out: Discussed and Needs instruction/review at follow-up  Physical Activity: Assessed and Discussed  Medications: Assessed and Discussed  Orals: Assessed, Discussed and Needs  instruction/review at follow-up  Injected Medications: Assessed and Discussed   Storage/Exp:Assessed and Discussed   Site Rotation: Discussed   Sites Assessed: no    Diabetes Disease Process: Assessed and Discussed    Acute Complications: Prevent, Detect, Treat:  Hypoglycemia: Assessed  Hyperglycemia: Assessed, Discussed and Needs instruction/review at follow-up  Sick Days: Not addressed  Driving: Not addressed    Chronic Complications  Foot Care:Not addressed  Skin Care: Discussed  Eye: Discussed  ABC: Discussed  Teeth:Not addressed  Goal Setting and Problem Solving: Assessed and Discussed  Barriers: Assessed and Discussed  Psychosocial Adjustments: Assessed and Discussed      Time spent with the patient: 60 minutes for diabetes education and counseling.   Previous Education: yes  Visit Type:DSMT        Norah Lepe RN CDE  Diabetes Education  12/31/2019    Much or all of the text in this note was generated through the use of the Dragon Dictate voice-to-text software. Errors in spelling or words which seem out of context are unintentional. Sound alike errors, in particular, may have escaped editing.  Medication recommendations were made  based upon / using [employer] CDE Policy and Protocol with patients primary care provider.

## 2021-06-04 NOTE — TELEPHONE ENCOUNTER
Refill Approved    Rx renewed per Medication Renewal Policy. Medication was last renewed on 1/14/2019       Ramsey Robb, [facility] [place] Connection Triage/Med Refill 12/25/2019     Requested Prescriptions   Pending Prescriptions Disp Refills     glipiZIDE (GLUCOTROL) 10 MG tablet [Pharmacy Med Name: glipiZIDE Oral Tablet 10 MG] 360 tablet 1     Sig: TAKE 2 TABLETS BY MOUTH TWICE DAILY BEFORE MEALS       Oral Hypoglycemics Refill Protocol Failed - 12/23/2019  7:01 AM        Failed - Microalbumin in last year      Microalbumin, Random Urine   Date Value Ref Range Status   12/05/2018 1.21 0.00 - 1.99 mg/dL Final                  Passed - Visit with PCP or prescribing provider visit in last 6 months       Last office visit with prescriber/PCP: 11/22/2019 OR same dept: 11/22/2019 Carol Chavez MD OR same specialty: 11/22/2019 Carol Chavez MD Last physical: Visit date not found Last MTM visit: Visit date not found         Next appt within 3 mo: Visit date not found  Next physical within 3 mo: Visit date not found  Prescriber OR PCP: Carol Chavez MD  Last diagnosis associated with med order: 1. Diabetes mellitus, type 2 (H)  - glipiZIDE (GLUCOTROL) 10 MG tablet [Pharmacy Med Name: glipiZIDE Oral Tablet 10 MG]; TAKE 2 TABLETS BY MOUTH TWICE DAILY BEFORE MEALS  Dispense: 360 tablet; Refill: 1    2. Hyperlipidemia  - simvastatin (ZOCOR) 40 MG tablet [Pharmacy Med Name: Simvastatin Oral Tablet 40 MG]; TAKE 1 TABLET BY MOUTH NIGHTLY AT BEDTIME  Dispense: 90 tablet; Refill: 2     If protocol passes may refill for 12 months if within 3 months of last provider visit (or a total of 15 months).           Passed - A1C in last 6 months     Hemoglobin A1c   Date Value Ref Range Status   11/22/2019 9.2 (H) 3.5 - 6.0 % Final               Passed - Blood pressure in last year     BP Readings from Last 1 Encounters:   11/22/19 124/68             Passed - Serum creatinine in last year     Creatinine   Date Value Ref Range  Status   04/03/2019 0.84 0.60 - 1.10 mg/dL Final             simvastatin (ZOCOR) 40 MG tablet [Pharmacy Med Name: Simvastatin Oral Tablet 40 MG] 90 tablet 2     Sig: TAKE 1 TABLET BY MOUTH NIGHTLY AT BEDTIME       Statins Refill Protocol (Hmg CoA Reductase Inhibitors) Passed - 12/23/2019  7:01 AM        Passed - PCP or prescribing provider visit in past 12 months      Last office visit with prescriber/PCP: 11/22/2019 Carol Chavez MD OR same dept: 11/22/2019 Carol Chavez MD OR same specialty: 11/22/2019 Carol Chavez MD  Last physical: 4/3/2019 Last MTM visit: Visit date not found   Next visit within 3 mo: Visit date not found  Next physical within 3 mo: Visit date not found  Prescriber OR PCP: Carol Chavez MD  Last diagnosis associated with med order: 1. Diabetes mellitus, type 2 (H)  - glipiZIDE (GLUCOTROL) 10 MG tablet [Pharmacy Med Name: glipiZIDE Oral Tablet 10 MG]; TAKE 2 TABLETS BY MOUTH TWICE DAILY BEFORE MEALS  Dispense: 360 tablet; Refill: 1    2. Hyperlipidemia  - simvastatin (ZOCOR) 40 MG tablet [Pharmacy Med Name: Simvastatin Oral Tablet 40 MG]; TAKE 1 TABLET BY MOUTH NIGHTLY AT BEDTIME  Dispense: 90 tablet; Refill: 2    If protocol passes may refill for 12 months if within 3 months of last provider visit (or a total of 15 months).

## 2021-06-04 NOTE — TELEPHONE ENCOUNTER
RN cannot approve Refill Request    RN can NOT refill this medication Protocol failed and NO refill given.      Danielle Veloz, [facility] Triage/Med Refill 12/12/2019    Requested Prescriptions   Pending Prescriptions Disp Refills     metFORMIN (GLUCOPHAGE) 1000 MG tablet [Pharmacy Med Name: metFORMIN HCl Oral Tablet 1000 MG] 180 tablet 3     Sig: take 1 tablet by mouth twice daily with meals       Metformin Refill Protocol Failed - 12/10/2019  6:56 PM        Failed - LFT or AST or ALT in last 12 months     Albumin   Date Value Ref Range Status   01/18/2017 4.1 3.5 - 5.0 g/dL Final     Bilirubin, Total   Date Value Ref Range Status   01/18/2017 0.4 0.0 - 1.0 mg/dL Final     Alkaline Phosphatase   Date Value Ref Range Status   01/18/2017 51 45 - 120 U/L Final     AST   Date Value Ref Range Status   01/18/2017 42 (H) 0 - 40 U/L Final     ALT   Date Value Ref Range Status   01/18/2017 78 (H) 0 - 45 U/L Final     Protein, Total   Date Value Ref Range Status   01/18/2017 7.1 6.0 - 8.0 g/dL Final                Failed - Microalbumin in last year      Microalbumin, Random Urine   Date Value Ref Range Status   12/05/2018 1.21 0.00 - 1.99 mg/dL Final                  Passed - Blood pressure in last 12 months     BP Readings from Last 1 Encounters:   11/22/19 124/68             Passed - GFR or Serum Creatinine in last 6 months     GFR MDRD Non Af Amer   Date Value Ref Range Status   04/03/2019 >60 >60 mL/min/1.73m2 Final     GFR MDRD Af Amer   Date Value Ref Range Status   04/03/2019 >60 >60 mL/min/1.73m2 Final             Passed - Visit with PCP or prescribing provider visit in last 6 months or next 3 months     Last office visit with prescriber/PCP: Visit date not found OR same dept: 11/22/2019 Carol Chavez MD OR same specialty: 11/22/2019 Carol Chavez MD Last physical: Visit date not found Last MTM visit: Visit date not found         Next appt within 3 mo: Visit date not found  Next physical within 3  mo: Visit date not found  Prescriber OR PCP: Jaye Vazquez MD  Last diagnosis associated with med order: 1. Type 2 diabetes mellitus without complication, without long-term current use of insulin (H)  - metFORMIN (GLUCOPHAGE) 1000 MG tablet [Pharmacy Med Name: metFORMIN HCl Oral Tablet 1000 MG]; take 1 tablet by mouth twice daily with meals  Dispense: 180 tablet; Refill: 0     If protocol passes may refill for 12 months if within 3 months of last provider visit (or a total of 15 months).           Passed - A1C in last 6 months     Hemoglobin A1c   Date Value Ref Range Status   11/22/2019 9.2 (H) 3.5 - 6.0 % Final

## 2021-06-04 NOTE — TELEPHONE ENCOUNTER
RN cannot approve Refill Request    RN can NOT refill this medication Protocol failed and NO refill given.        Danielle Veloz, [facility] Triage/Med Refill 1/2/2020    Requested Prescriptions   Pending Prescriptions Disp Refills     TRULICITY 1.5 mg/0.5 mL PnIj [Pharmacy Med Name: Trulicity Subcutaneous Solution Pen-injector 1.5 MG/0.5ML] 2 mL 0     Sig: INJECT 1.5 MG UNDER THE SKIN ONCE A WEEK.       Insulin/GLP-1 Refill Protocol Failed - 12/31/2019  7:01 AM        Failed - Microalbumin in last year     Microalbumin, Random Urine   Date Value Ref Range Status   12/05/2018 1.21 0.00 - 1.99 mg/dL Final                  Passed - Visit with PCP or prescribing provider visit in last 6 months     Last office visit with prescriber/PCP: 11/22/2019 OR same dept: 11/22/2019 Carol Chavez MD OR same specialty: 11/22/2019 Carol Chavez MD Last physical: Visit date not found Last MTM visit: Visit date not found     Next appt within 3 mo: Visit date not found  Next physical within 3 mo: Visit date not found  Prescriber OR PCP: Carol Chavez MD  Last diagnosis associated with med order: 1. Type 2 diabetes mellitus without complication, without long-term current use of insulin (H)  - TRULICITY 1.5 mg/0.5 mL PnIj [Pharmacy Med Name: Trulicity Subcutaneous Solution Pen-injector 1.5 MG/0.5ML]; INJECT 1.5 MG UNDER THE SKIN ONCE A WEEK.   Dispense: 2 mL; Refill: 0    If protocol passes may refill for 6 months if within 3 months of last provider visit (or a total of 9 months).              Passed - A1C in last 6 months     Hemoglobin A1c   Date Value Ref Range Status   11/22/2019 9.2 (H) 3.5 - 6.0 % Final               Passed - Blood pressure in last year     BP Readings from Last 1 Encounters:   11/22/19 124/68             Passed - Creatinine done in last year     Creatinine   Date Value Ref Range Status   04/03/2019 0.84 0.60 - 1.10 mg/dL Final

## 2021-06-04 NOTE — TELEPHONE ENCOUNTER
Patient saw Norah today and was told shed be given a prescription for Jardiance - she is wondering if she could receive a 3 month supply because she has met her yearly deductible and would like to use that.     OKTLDM

## 2021-06-05 VITALS — HEIGHT: 61 IN | WEIGHT: 154.8 LBS | HEART RATE: 71 BPM | BODY MASS INDEX: 29.23 KG/M2

## 2021-06-05 VITALS — BODY MASS INDEX: 29.37 KG/M2 | WEIGHT: 154.4 LBS

## 2021-06-05 NOTE — TELEPHONE ENCOUNTER
All medicines have been refilled recently.  Please double check why these are being requested, sending to different pharm?   [Time Spent: ___ minutes] : I have spent [unfilled] minutes of time on the encounter. [>50% of the face to face encounter time was spent on counseling and/or coordination of care for ___] : Greater than 50% of the face to face encounter time was spent on counseling and/or coordination of care for [unfilled]

## 2021-06-05 NOTE — TELEPHONE ENCOUNTER
Called and spoke with Yesenia  Advised patient we have not received results yet    Patient expressed understanding.    Marcela Mensah, [facility]

## 2021-06-05 NOTE — TELEPHONE ENCOUNTER
RN cannot approve Refill Request    RN can NOT refill this medication PCP messaged that patient is overdue for Labs. Last office visit: 11/22/2019 Carol Chavez MD Last Physical: 4/3/2019 Last MTM visit: Visit date not found Last visit same specialty: 11/22/2019 Carol Chavez MD.  Next visit within 3 mo: Visit date not found  Next physical within 3 mo: Visit date not found      Meagan Monterroso, [facility] Connection Triage/Med Refill 1/26/2020    Requested Prescriptions   Pending Prescriptions Disp Refills     glipiZIDE (GLUCOTROL) 10 MG tablet [Pharmacy Med Name: glipiZIDE Oral Tablet 10 MG] 120 tablet 0     Sig: TAKE 2 TABLETS BY MOUTH TWICE DAILY BEFORE MEALS       Oral Hypoglycemics Refill Protocol Failed - 1/26/2020  7:00 AM        Failed - Microalbumin in last year      Microalbumin, Random Urine   Date Value Ref Range Status   12/05/2018 1.21 0.00 - 1.99 mg/dL Final                  Passed - Visit with PCP or prescribing provider visit in last 6 months       Last office visit with prescriber/PCP: 11/22/2019 OR same dept: 11/22/2019 Carol Chavez MD OR same specialty: 11/22/2019 Carol Chavez MD Last physical: Visit date not found Last MTM visit: Visit date not found         Next appt within 3 mo: Visit date not found  Next physical within 3 mo: Visit date not found  Prescriber OR PCP: Carol Chavez MD  Last diagnosis associated with med order: 1. Diabetes mellitus, type 2 (H)  - glipiZIDE (GLUCOTROL) 10 MG tablet [Pharmacy Med Name: glipiZIDE Oral Tablet 10 MG]; TAKE 2 TABLETS BY MOUTH TWICE DAILY BEFORE MEALS  Dispense: 120 tablet; Refill: 0     If protocol passes may refill for 12 months if within 3 months of last provider visit (or a total of 15 months).           Passed - A1C in last 6 months     Hemoglobin A1c   Date Value Ref Range Status   11/22/2019 9.2 (H) 3.5 - 6.0 % Final               Passed - Blood pressure in last year     BP Readings from Last 1 Encounters:    11/22/19 124/68             Passed - Serum creatinine in last year     Creatinine   Date Value Ref Range Status   04/03/2019 0.84 0.60 - 1.10 mg/dL Final

## 2021-06-05 NOTE — TELEPHONE ENCOUNTER
RN cannot approve Refill Request: Leigh Ann    RN can NOT refill this medication med is not covered by policy/route to provider. Last office visit: 11/22/2019 Carol Chavez MD Last Physical: 4/3/2019 Last MTM visit: Visit date not found Last visit same specialty: 11/22/2019 Carol Chavez MD.  Next visit within 3 mo: Visit date not found  Next physical within 3 mo: Visit date not found    RN cannot approve Refill Requests: Glipizide, Metformin and Trulicity    RN can NOT refill this medication PCP messaged that patient is overdue for Labs. Last office visit: 11/22/2019 Carol Chavez MD Last Physical: 4/3/2019 Last MTM visit: Visit date not found Last visit same specialty: 11/22/2019 Carol Chavez MD.  Next visit within 3 mo: Visit date not found  Next physical within 3 mo: Visit date not found      Refills Approved x 4     Rx renewed per Medication Renewal Policy. Medication was last renewed on   Blood glucose test strips = 10/20/2019 with 11 refills.  Ferrous sulfate = 11/25/2019 with 1 refill  Levothyroxine = 11/22/2019 with 3 refills  Simvastatin = 12/25/2019 with 3 refills.  Last office visit: 11/22/2019 with PCP Dr JOCELYN Talbot, [facility] Triage/Med Refill 1/11/2020     Requested Prescriptions   Pending Prescriptions Disp Refills     blood glucose test (ONETOUCH ULTRA TEST) strips 100 strip 11     Sig: Use 1 each As Directed 3 (three) times a day.       Diabetic Supplies Refill Protocol Passed - 1/8/2020  4:07 PM        Passed - Visit with PCP or prescribing provider visit in last 6 months     Last office visit with prescriber/PCP: 11/22/2019 Caorl Chavez MD OR same dept: 11/22/2019 Carol Chavez MD OR same specialty: 11/22/2019 Carol Chavez MD  Last physical: 4/3/2019 Last MTM visit: Visit date not found   Next visit within 3 mo: Visit date not found  Next physical within 3 mo: Visit date not found  Prescriber OR PCP: Carol Briones  MD Scott  Last diagnosis associated with med order: 1. Type 2 diabetes mellitus without complication, without long-term current use of insulin (H)  - blood glucose test (ONETOUCH ULTRA TEST) strips; Use 1 each As Directed 3 (three) times a day.  Dispense: 100 strip; Refill: 11  - empagliflozin (JARDIANCE) 10 mg Tab; Take 1 tablet (10 mg total) by mouth daily.  Dispense: 90 tablet; Refill: 0  - metFORMIN (GLUCOPHAGE) 1000 MG tablet; Take 1 tablet (1,000 mg total) by mouth 2 (two) times a day with meals.  Dispense: 180 tablet; Refill: 0  - dulaglutide (TRULICITY) 1.5 mg/0.5 mL PnIj  Dispense: 6 mL; Refill: 1    2. Iron deficiency anemia, unspecified iron deficiency anemia type  - ferrous sulfate 325 (65 FE) MG tablet; Take 1 tablet (325 mg total) by mouth daily with breakfast.  Dispense: 90 tablet; Refill: 0    3. Diabetes mellitus, type 2 (H)  - glipiZIDE (GLUCOTROL) 10 MG tablet; Take 2 tablets (20 mg total) by mouth 2 (two) times a day before meals.  Dispense: 180 tablet; Refill: 0    4. Hypothyroidism, unspecified type  - levothyroxine (SYNTHROID, LEVOTHROID) 112 MCG tablet; Take 1 tablet (112 mcg total) by mouth Daily at 6:00 am.  Dispense: 90 tablet; Refill: 3    5. Hyperlipidemia  - simvastatin (ZOCOR) 40 MG tablet; Take 1 tablet (40 mg total) by mouth at bedtime.  Dispense: 90 tablet; Refill: 0    If protocol passes may refill for 12 months if within 3 months of last provider visit (or a total of 15 months).             Passed - A1C in last 6 months     Hemoglobin A1c   Date Value Ref Range Status   11/22/2019 9.2 (H) 3.5 - 6.0 % Final               empagliflozin (JARDIANCE) 10 mg Tab 90 tablet 0     Sig: Take 1 tablet (10 mg total) by mouth daily.       There is no refill protocol information for this order        ferrous sulfate 325 (65 FE) MG tablet 90 tablet 0     Sig: Take 1 tablet (325 mg total) by mouth daily with breakfast.       Iron Supplements Refill Protocol Passed - 1/8/2020  4:07 PM         Passed - PCP or prescribing provider visit in past 12 months       Last office visit with prescriber/PCP: 11/22/2019 Carol Chavez MD OR same dept: 11/22/2019 Carol Chavez MD OR same specialty: 11/22/2019 Carol Chavez MD  Last physical: 4/3/2019 Last MTM visit: Visit date not found   Next visit within 3 mo: Visit date not found  Next physical within 3 mo: Visit date not found  Prescriber OR PCP: Carol Chavez MD  Last diagnosis associated with med order: 1. Type 2 diabetes mellitus without complication, without long-term current use of insulin (H)  - blood glucose test (ONETOUCH ULTRA TEST) strips; Use 1 each As Directed 3 (three) times a day.  Dispense: 100 strip; Refill: 11  - empagliflozin (JARDIANCE) 10 mg Tab; Take 1 tablet (10 mg total) by mouth daily.  Dispense: 90 tablet; Refill: 0  - metFORMIN (GLUCOPHAGE) 1000 MG tablet; Take 1 tablet (1,000 mg total) by mouth 2 (two) times a day with meals.  Dispense: 180 tablet; Refill: 0  - dulaglutide (TRULICITY) 1.5 mg/0.5 mL PnIj  Dispense: 6 mL; Refill: 1    2. Iron deficiency anemia, unspecified iron deficiency anemia type  - ferrous sulfate 325 (65 FE) MG tablet; Take 1 tablet (325 mg total) by mouth daily with breakfast.  Dispense: 90 tablet; Refill: 0    3. Diabetes mellitus, type 2 (H)  - glipiZIDE (GLUCOTROL) 10 MG tablet; Take 2 tablets (20 mg total) by mouth 2 (two) times a day before meals.  Dispense: 180 tablet; Refill: 0    4. Hypothyroidism, unspecified type  - levothyroxine (SYNTHROID, LEVOTHROID) 112 MCG tablet; Take 1 tablet (112 mcg total) by mouth Daily at 6:00 am.  Dispense: 90 tablet; Refill: 3    5. Hyperlipidemia  - simvastatin (ZOCOR) 40 MG tablet; Take 1 tablet (40 mg total) by mouth at bedtime.  Dispense: 90 tablet; Refill: 0    If protocol passes may refill for 12 months if within 3 months of last provider visit (or a total of 15 months).             Passed - Hemoglobin or Hematocrit in last 12 months      Hemoglobin   Date Value Ref Range Status   11/22/2019 11.2 (L) 12.0 - 16.0 g/dL Final     Hematocrit   Date Value Ref Range Status   11/22/2019 35.8 35.0 - 47.0 % Final             glipiZIDE (GLUCOTROL) 10 MG tablet 180 tablet 0     Sig: Take 2 tablets (20 mg total) by mouth 2 (two) times a day before meals.       Oral Hypoglycemics Refill Protocol Failed - 1/8/2020  4:07 PM        Failed - Microalbumin in last year      Microalbumin, Random Urine   Date Value Ref Range Status   12/05/2018 1.21 0.00 - 1.99 mg/dL Final                  Passed - Visit with PCP or prescribing provider visit in last 6 months       Last office visit with prescriber/PCP: 11/22/2019 OR same dept: 11/22/2019 Carol Chavez MD OR same specialty: 11/22/2019 Carol Chavez MD Last physical: Visit date not found Last MTM visit: Visit date not found         Next appt within 3 mo: Visit date not found  Next physical within 3 mo: Visit date not found  Prescriber OR PCP: Carol Chavez MD  Last diagnosis associated with med order: 1. Type 2 diabetes mellitus without complication, without long-term current use of insulin (H)  - blood glucose test (ONETOUCH ULTRA TEST) strips; Use 1 each As Directed 3 (three) times a day.  Dispense: 100 strip; Refill: 11  - empagliflozin (JARDIANCE) 10 mg Tab; Take 1 tablet (10 mg total) by mouth daily.  Dispense: 90 tablet; Refill: 0  - metFORMIN (GLUCOPHAGE) 1000 MG tablet; Take 1 tablet (1,000 mg total) by mouth 2 (two) times a day with meals.  Dispense: 180 tablet; Refill: 0  - dulaglutide (TRULICITY) 1.5 mg/0.5 mL PnIj  Dispense: 6 mL; Refill: 1    2. Iron deficiency anemia, unspecified iron deficiency anemia type  - ferrous sulfate 325 (65 FE) MG tablet; Take 1 tablet (325 mg total) by mouth daily with breakfast.  Dispense: 90 tablet; Refill: 0    3. Diabetes mellitus, type 2 (H)  - glipiZIDE (GLUCOTROL) 10 MG tablet; Take 2 tablets (20 mg total) by mouth 2 (two) times a day before meals.   Dispense: 180 tablet; Refill: 0    4. Hypothyroidism, unspecified type  - levothyroxine (SYNTHROID, LEVOTHROID) 112 MCG tablet; Take 1 tablet (112 mcg total) by mouth Daily at 6:00 am.  Dispense: 90 tablet; Refill: 3    5. Hyperlipidemia  - simvastatin (ZOCOR) 40 MG tablet; Take 1 tablet (40 mg total) by mouth at bedtime.  Dispense: 90 tablet; Refill: 0     If protocol passes may refill for 12 months if within 3 months of last provider visit (or a total of 15 months).           Passed - A1C in last 6 months     Hemoglobin A1c   Date Value Ref Range Status   11/22/2019 9.2 (H) 3.5 - 6.0 % Final               Passed - Blood pressure in last year     BP Readings from Last 1 Encounters:   11/22/19 124/68             Passed - Serum creatinine in last year     Creatinine   Date Value Ref Range Status   04/03/2019 0.84 0.60 - 1.10 mg/dL Final             levothyroxine (SYNTHROID, LEVOTHROID) 112 MCG tablet 90 tablet 3     Sig: Take 1 tablet (112 mcg total) by mouth Daily at 6:00 am.       Thyroid Hormones Protocol Passed - 1/8/2020  4:07 PM        Passed - Provider visit in past 12 months or next 3 months     Last office visit with prescriber/PCP: 11/22/2019 Carol Chavez MD OR same dept: 11/22/2019 Carol Chavez MD OR same specialty: 11/22/2019 Carol Chavez MD  Last physical: 4/3/2019 Last MTM visit: Visit date not found   Next visit within 3 mo: Visit date not found  Next physical within 3 mo: Visit date not found  Prescriber OR PCP: Carol Chavez MD  Last diagnosis associated with med order: 1. Type 2 diabetes mellitus without complication, without long-term current use of insulin (H)  - blood glucose test (ONETOUCH ULTRA TEST) strips; Use 1 each As Directed 3 (three) times a day.  Dispense: 100 strip; Refill: 11  - empagliflozin (JARDIANCE) 10 mg Tab; Take 1 tablet (10 mg total) by mouth daily.  Dispense: 90 tablet; Refill: 0  - metFORMIN (GLUCOPHAGE) 1000 MG tablet; Take 1 tablet (1,000  mg total) by mouth 2 (two) times a day with meals.  Dispense: 180 tablet; Refill: 0  - dulaglutide (TRULICITY) 1.5 mg/0.5 mL PnIj  Dispense: 6 mL; Refill: 1    2. Iron deficiency anemia, unspecified iron deficiency anemia type  - ferrous sulfate 325 (65 FE) MG tablet; Take 1 tablet (325 mg total) by mouth daily with breakfast.  Dispense: 90 tablet; Refill: 0    3. Diabetes mellitus, type 2 (H)  - glipiZIDE (GLUCOTROL) 10 MG tablet; Take 2 tablets (20 mg total) by mouth 2 (two) times a day before meals.  Dispense: 180 tablet; Refill: 0    4. Hypothyroidism, unspecified type  - levothyroxine (SYNTHROID, LEVOTHROID) 112 MCG tablet; Take 1 tablet (112 mcg total) by mouth Daily at 6:00 am.  Dispense: 90 tablet; Refill: 3    5. Hyperlipidemia  - simvastatin (ZOCOR) 40 MG tablet; Take 1 tablet (40 mg total) by mouth at bedtime.  Dispense: 90 tablet; Refill: 0    If protocol passes may refill for 12 months if within 3 months of last provider visit (or a total of 15 months).             Passed - TSH on file in past 12 months for patient age 12 & older     TSH   Date Value Ref Range Status   11/22/2019 0.04 (L) 0.30 - 5.00 uIU/mL Final                   metFORMIN (GLUCOPHAGE) 1000 MG tablet 180 tablet 0     Sig: Take 1 tablet (1,000 mg total) by mouth 2 (two) times a day with meals.       Metformin Refill Protocol Failed - 1/8/2020  4:07 PM        Failed - LFT or AST or ALT in last 12 months     Albumin   Date Value Ref Range Status   01/18/2017 4.1 3.5 - 5.0 g/dL Final     Bilirubin, Total   Date Value Ref Range Status   01/18/2017 0.4 0.0 - 1.0 mg/dL Final     Alkaline Phosphatase   Date Value Ref Range Status   01/18/2017 51 45 - 120 U/L Final     AST   Date Value Ref Range Status   01/18/2017 42 (H) 0 - 40 U/L Final     ALT   Date Value Ref Range Status   01/18/2017 78 (H) 0 - 45 U/L Final     Protein, Total   Date Value Ref Range Status   01/18/2017 7.1 6.0 - 8.0 g/dL Final                Failed - Microalbumin in last  year      Microalbumin, Random Urine   Date Value Ref Range Status   12/05/2018 1.21 0.00 - 1.99 mg/dL Final                  Passed - Blood pressure in last 12 months     BP Readings from Last 1 Encounters:   11/22/19 124/68             Passed - GFR or Serum Creatinine in last 6 months     GFR MDRD Non Af Amer   Date Value Ref Range Status   04/03/2019 >60 >60 mL/min/1.73m2 Final     GFR MDRD Af Amer   Date Value Ref Range Status   04/03/2019 >60 >60 mL/min/1.73m2 Final             Passed - Visit with PCP or prescribing provider visit in last 6 months or next 3 months     Last office visit with prescriber/PCP: 11/22/2019 OR same dept: 11/22/2019 Carol Chavez MD OR same specialty: 11/22/2019 Carol Chavez MD Last physical: Visit date not found Last MTM visit: Visit date not found         Next appt within 3 mo: Visit date not found  Next physical within 3 mo: Visit date not found  Prescriber OR PCP: Carol Chavez MD  Last diagnosis associated with med order: 1. Type 2 diabetes mellitus without complication, without long-term current use of insulin (H)  - blood glucose test (ONETOUCH ULTRA TEST) strips; Use 1 each As Directed 3 (three) times a day.  Dispense: 100 strip; Refill: 11  - empagliflozin (JARDIANCE) 10 mg Tab; Take 1 tablet (10 mg total) by mouth daily.  Dispense: 90 tablet; Refill: 0  - metFORMIN (GLUCOPHAGE) 1000 MG tablet; Take 1 tablet (1,000 mg total) by mouth 2 (two) times a day with meals.  Dispense: 180 tablet; Refill: 0  - dulaglutide (TRULICITY) 1.5 mg/0.5 mL PnIj  Dispense: 6 mL; Refill: 1    2. Iron deficiency anemia, unspecified iron deficiency anemia type  - ferrous sulfate 325 (65 FE) MG tablet; Take 1 tablet (325 mg total) by mouth daily with breakfast.  Dispense: 90 tablet; Refill: 0    3. Diabetes mellitus, type 2 (H)  - glipiZIDE (GLUCOTROL) 10 MG tablet; Take 2 tablets (20 mg total) by mouth 2 (two) times a day before meals.  Dispense: 180 tablet; Refill: 0    4.  Hypothyroidism, unspecified type  - levothyroxine (SYNTHROID, LEVOTHROID) 112 MCG tablet; Take 1 tablet (112 mcg total) by mouth Daily at 6:00 am.  Dispense: 90 tablet; Refill: 3    5. Hyperlipidemia  - simvastatin (ZOCOR) 40 MG tablet; Take 1 tablet (40 mg total) by mouth at bedtime.  Dispense: 90 tablet; Refill: 0     If protocol passes may refill for 12 months if within 3 months of last provider visit (or a total of 15 months).           Passed - A1C in last 6 months     Hemoglobin A1c   Date Value Ref Range Status   11/22/2019 9.2 (H) 3.5 - 6.0 % Final               simvastatin (ZOCOR) 40 MG tablet 90 tablet 0     Sig: Take 1 tablet (40 mg total) by mouth at bedtime.       Statins Refill Protocol (Hmg CoA Reductase Inhibitors) Passed - 1/8/2020  4:07 PM        Passed - PCP or prescribing provider visit in past 12 months      Last office visit with prescriber/PCP: 11/22/2019 Carol Chavez MD OR same dept: 11/22/2019 Carol Chavez MD OR same specialty: 11/22/2019 Carol Chavez MD  Last physical: 4/3/2019 Last MTM visit: Visit date not found   Next visit within 3 mo: Visit date not found  Next physical within 3 mo: Visit date not found  Prescriber OR PCP: Carol Chavez MD  Last diagnosis associated with med order: 1. Type 2 diabetes mellitus without complication, without long-term current use of insulin (H)  - blood glucose test (ONETOUCH ULTRA TEST) strips; Use 1 each As Directed 3 (three) times a day.  Dispense: 100 strip; Refill: 11  - empagliflozin (JARDIANCE) 10 mg Tab; Take 1 tablet (10 mg total) by mouth daily.  Dispense: 90 tablet; Refill: 0  - metFORMIN (GLUCOPHAGE) 1000 MG tablet; Take 1 tablet (1,000 mg total) by mouth 2 (two) times a day with meals.  Dispense: 180 tablet; Refill: 0  - dulaglutide (TRULICITY) 1.5 mg/0.5 mL PnIj  Dispense: 6 mL; Refill: 1    2. Iron deficiency anemia, unspecified iron deficiency anemia type  - ferrous sulfate 325 (65 FE) MG tablet; Take 1  tablet (325 mg total) by mouth daily with breakfast.  Dispense: 90 tablet; Refill: 0    3. Diabetes mellitus, type 2 (H)  - glipiZIDE (GLUCOTROL) 10 MG tablet; Take 2 tablets (20 mg total) by mouth 2 (two) times a day before meals.  Dispense: 180 tablet; Refill: 0    4. Hypothyroidism, unspecified type  - levothyroxine (SYNTHROID, LEVOTHROID) 112 MCG tablet; Take 1 tablet (112 mcg total) by mouth Daily at 6:00 am.  Dispense: 90 tablet; Refill: 3    5. Hyperlipidemia  - simvastatin (ZOCOR) 40 MG tablet; Take 1 tablet (40 mg total) by mouth at bedtime.  Dispense: 90 tablet; Refill: 0    If protocol passes may refill for 12 months if within 3 months of last provider visit (or a total of 15 months).             dulaglutide (TRULICITY) 1.5 mg/0.5 mL PnIj 6 mL 1       Insulin/GLP-1 Refill Protocol Failed - 1/8/2020  4:07 PM        Failed - Microalbumin in last year     Microalbumin, Random Urine   Date Value Ref Range Status   12/05/2018 1.21 0.00 - 1.99 mg/dL Final                  Passed - Visit with PCP or prescribing provider visit in last 6 months     Last office visit with prescriber/PCP: 11/22/2019 OR same dept: 11/22/2019 Carol Chavez MD OR same specialty: 11/22/2019 Carol Chavez MD Last physical: Visit date not found Last MTM visit: Visit date not found     Next appt within 3 mo: Visit date not found  Next physical within 3 mo: Visit date not found  Prescriber OR PCP: Carol Chavez MD  Last diagnosis associated with med order: 1. Type 2 diabetes mellitus without complication, without long-term current use of insulin (H)  - blood glucose test (ONETOUCH ULTRA TEST) strips; Use 1 each As Directed 3 (three) times a day.  Dispense: 100 strip; Refill: 11  - empagliflozin (JARDIANCE) 10 mg Tab; Take 1 tablet (10 mg total) by mouth daily.  Dispense: 90 tablet; Refill: 0  - metFORMIN (GLUCOPHAGE) 1000 MG tablet; Take 1 tablet (1,000 mg total) by mouth 2 (two) times a day with meals.  Dispense: 180  tablet; Refill: 0  - dulaglutide (TRULICITY) 1.5 mg/0.5 mL PnIj  Dispense: 6 mL; Refill: 1    2. Iron deficiency anemia, unspecified iron deficiency anemia type  - ferrous sulfate 325 (65 FE) MG tablet; Take 1 tablet (325 mg total) by mouth daily with breakfast.  Dispense: 90 tablet; Refill: 0    3. Diabetes mellitus, type 2 (H)  - glipiZIDE (GLUCOTROL) 10 MG tablet; Take 2 tablets (20 mg total) by mouth 2 (two) times a day before meals.  Dispense: 180 tablet; Refill: 0    4. Hypothyroidism, unspecified type  - levothyroxine (SYNTHROID, LEVOTHROID) 112 MCG tablet; Take 1 tablet (112 mcg total) by mouth Daily at 6:00 am.  Dispense: 90 tablet; Refill: 3    5. Hyperlipidemia  - simvastatin (ZOCOR) 40 MG tablet; Take 1 tablet (40 mg total) by mouth at bedtime.  Dispense: 90 tablet; Refill: 0    If protocol passes may refill for 6 months if within 3 months of last provider visit (or a total of 9 months).              Passed - A1C in last 6 months     Hemoglobin A1c   Date Value Ref Range Status   11/22/2019 9.2 (H) 3.5 - 6.0 % Final               Passed - Blood pressure in last year     BP Readings from Last 1 Encounters:   11/22/19 124/68             Passed - Creatinine done in last year     Creatinine   Date Value Ref Range Status   04/03/2019 0.84 0.60 - 1.10 mg/dL Final

## 2021-06-05 NOTE — TELEPHONE ENCOUNTER
Central PA team  [phone]  Pool: HE PA MED ([id number])          PA has been initiated.       PA form completed and faxed insurance via Cover My Meds     Key:  [id number]     Medication:  Trulicity 1.5MG/0.5ML pen-injectors      Insurance:  [facility]         Response will be received via fax and may take up to 5-10 business days depending on plan

## 2021-06-05 NOTE — TELEPHONE ENCOUNTER
Test Results  Who is calling?:  Patient  Who ordered the test:  [facility]  Type of test: Other: Echocardiogram  Date of test:  1/18/20  Where was the test performed:  [facility]  What are your questions/concerns?:  Was told that the results would be faxed to PCP this morning and would like results please.  Okay to leave a detailed message?:  Yes

## 2021-06-05 NOTE — TELEPHONE ENCOUNTER
Prior Authorization Request  Who s requesting:  Patient  Pharmacy Name and Location: [facility]  Medication Name:    Disp Refills Start End    TRULICITY 1.5 mg/0.5 mL PnIj 6 mL 1 1/2/2020     Sig: INJECT 1.5 MG UNDER THE SKIN ONCE A WEEK.    Sent to pharmacy as: Trulicity 1.5 mg/0.5 mL subcutaneous pen injector (dulaglutide)    E-Prescribing Status: Receipt confirmed by pharmacy (1/2/2020  2:04 PM CST)        Insurance Plan: [employer]  Insurance Member ID Number:  [id number]  CoverMyMeds Key: [id number]  Informed patient that prior authorizations can take up to 10 business days for response:   Yes  Okay to leave a detailed message: No      AND      Prior Authorization Request  Who s requesting:  Patient  Pharmacy Name and Location: [facility]  Medication Name:   empagliflozin (JARDIANCE) 10 mg Tab 90 tablet 0 12/31/2019     Sig - Route: Take 1 tablet (10 mg total) by mouth daily. - Oral    Sent to pharmacy as: empagliflozin 10 mg tablet (Jardiance)    Cosign for Ordering: Accepted by Carol Chavez MD on 12/31/2019 10:59 AM    E-Prescribing Status: Receipt confirmed by pharmacy (12/31/2019 10:57 AM CST)      Insurance Plan: [employer]  Insurance Member ID Number:  [id number]  CoverMyMeds Key: [id number]  Informed patient that prior authorizations can take up to 10 business days for response:   Yes  Okay to leave a detailed message: No

## 2021-06-05 NOTE — TELEPHONE ENCOUNTER
Patient Returning Call  Reason for call:  Status of results.  Information relayed to patient:  The patient contacted [facility] and they were going to fax the report right over to the PCP clinic.  Patient is calling to see if has received's please call patient back.  Patient has additional questions:  Yes  If YES, what are your questions/concerns:  NA  Okay to leave a detailed message?: Yes

## 2021-06-05 NOTE — TELEPHONE ENCOUNTER
Post-Care Instructions: I reviewed with the patient in detail post-care instructions. Patient is to wear sunprotection, and avoid picking at any of the treated lesions. Pt may apply Vaseline to crusted or scabbing areas. Started Jardiance about 1.5 weeks ago.   The first night she took it she had a pain above the left breast. Started about the breast bone and traveled across the breast.   Has had pain 5 nights (when laying down) and again this morning before working out.   Pt states it does not feel internal, can feel the area that hurts but it does not change the pain.   Lasts only about 5 minutes and it goes away.   Moderate to severe pain when it happens. Not having any pains currently.     Reason for Disposition    Intermittent chest pains persist > 3 days    Protocols used: CHEST PAIN-A-OH    Pt was advised on when to call back per protocol recommendations.  Pt agreed to plan.   Pt prefers to see Dr. Chavez, it no openings willing to be seen at another clinic.   Pt will be seen today at [facility] due to no openings in the clinic after 330.  Meagan Monterroso, RN   [facility] RN Triage     Pared With?: 15 blade Number Of Freeze-Thaw Cycles: 3 freeze-thaw cycles Medical Necessity Information: It is in your best interest to select a reason for this procedure from the list below. All of these items fulfill various CMS LCD requirements except the new and changing color options. Render Note In Bullet Format When Appropriate: No Duration Of Freeze Thaw-Cycle (Seconds): 5-10 Detail Level: Detailed Render Post-Care Instructions In Note?: yes Medical Necessity Clause: This procedure was medically necessary because the lesions that were treated were: Consent: The patient's consent was obtained including but not limited to risks of crusting, scabbing, blistering, scarring, darker or lighter pigmentary change, recurrence, incomplete removal and infection.

## 2021-06-05 NOTE — TELEPHONE ENCOUNTER
The results should be interpreted and communicated by the provider who ordered the test, but the stress test looks normal.

## 2021-06-05 NOTE — TELEPHONE ENCOUNTER
Pt was confused, her insurance changed ad she didn't know how to initiate a prior auth... long story short its all taken care of we can refuse these and close encounter

## 2021-06-07 NOTE — TELEPHONE ENCOUNTER
Medication Request  Medication name: yeast infection  Requested Pharmacy: [facility]  Reason for request: Patient complains of having vaginal discharge and itching. Patient denies pain, urinary symptoms, or a fever. Patient stated she had the same symptoms a month ago and tried miconazole-3, OTC. Patient stated that helped with her symptoms. Patient stated the symptoms came back 2 days ago. Patient stated she thinks the Jardiance is causing her these vaginal symptoms.  When did you use medication last?:  A month ago  Patient offered appointment:  patient declined  Okay to leave a detailed message: yes [phone]

## 2021-06-07 NOTE — TELEPHONE ENCOUNTER
Please remind patient that she is due for recheck of her thyroid lab.  Please have her message me on my chart when she would like to do this and we can help arrange at a site that works for her.

## 2021-06-07 NOTE — TELEPHONE ENCOUNTER
Called and spoke with Yesenia    Patient advised as below, said she will do an evisit after she has been checking her blood sugars regularly.    Marcela Mensah, CMA

## 2021-06-07 NOTE — TELEPHONE ENCOUNTER
Medication Question or Clarification  Who is calling: Patient  What medication are you calling about (include dose and sig)?:   dulaglutide (TRULICITY) 1.5 mg/0.5 mL PnIj  1.5 mg, Subcutaneous, Weekly         Summary: Inject 1.5 mg under the skin once a week., Starting Mon 3/23/2020, Normal        Who prescribed the medication?: Carol Chavez MD  What is your question/concern?: Per chart patient was to see Diabetes in week after last visit 12/21/20  Now patient has an appointment to do a visit with Dr Chavez as per provider last visit patient was to truturn about 12.20.20..  Patient is asking if the the order can be changed to 90 day supply today as she cannot afford the monthly fee. Please advise.   Requested Pharmacy: [facility]  Okay to leave a detailed message?: Yes

## 2021-06-07 NOTE — TELEPHONE ENCOUNTER
Refill Request  Did you contact pharmacy: Yes  Medication name:   Requested Prescriptions     Pending Prescriptions Disp Refills     empagliflozin (JARDIANCE) 10 mg Tab 90 tablet 0     Sig: Take 1 tablet (10 mg total) by mouth daily.     metFORMIN (GLUCOPHAGE) 1000 MG tablet 180 tablet 2     Sig: Take 1 tablet (1,000 mg total) by mouth 2 (two) times a day with meals.     dulaglutide (TRULICITY) 1.5 mg/0.5 mL PnIj 6 mL 1     Who prescribed the medication: Dr Chavez  Requested Pharmacy: Changing pharmacy to [place] [id number].    Okay to leave a detailed message: yes

## 2021-06-07 NOTE — TELEPHONE ENCOUNTER
Called and spoke with patient    Advised message will wait for Dr. Chavez to address next week when she is in clinic    Patient expressed understanding.

## 2021-06-07 NOTE — TELEPHONE ENCOUNTER
No orders in Pt chart for TSH and Pt is wondering if she is due to get an A1C as well? Please advise.  Pt would like to get labs done at [facility] clinic.

## 2021-06-07 NOTE — TELEPHONE ENCOUNTER
Please remind patient to check in with me (via phone visit) or Norah for recheck of her diabetes.  She can message Norah virtually as well.

## 2021-06-07 NOTE — TELEPHONE ENCOUNTER
Reason contacted:  To notify Pt that her Rx was refilled    Information relayed:   Dr. Chavez would like Pt to schedule a telephone visit with either Dr. Chavez or Norah. Pt states she has already spoken with someone earlier this week regarding her medication refills and she states she will schedule an E-visit via [facility] with Dr. Chavez sometime next week or so.    Additional questions:  No  Further follow-up needed:  No  Okay to leave a detailed message:  Yes

## 2021-06-07 NOTE — TELEPHONE ENCOUNTER
Patient is due for a visit to review her blood sugars.  If she has been checking these, have her do a virtual visit for diabetes check.  (Phone or e-visit if she's willing to input her blood sugars into an e-visit or scan a pic of these.)

## 2021-06-07 NOTE — PROGRESS NOTES
"Jeannine Rasheed is a 59 y.o. female who is being evaluated via a billable telephone visit.      The patient has been notified of following:     \"This telephone visit will be conducted via a call between you and your physician/provider. We have found that certain health care needs can be provided without the need for a physical exam.  This service lets us provide the care you need with a short phone conversation.  If a prescription is necessary we can send it directly to your pharmacy.  If lab work is needed we can place an order for that and you can then stop by our lab to have the test done at a later time.    Telephone visits are billed at different rates depending on your insurance coverage. During this emergency period, for some insurers they may be billed the same as an in-person visit.  Please reach out to your insurance provider with any questions.    If during the course of the call the physician/provider feels a telephone visit is not appropriate, you will not be charged for this service.\"    Patient has given verbal consent to a Telephone visit? Yes    Patient would like to receive their AVS by AVS Preference: Amilcar.    Additional provider notes:   Called patient to conduct a virtual visit in relation to primarily type 2 diabetes mellitus.  This is being contacted via telephone due to coronavirus pandemic.  Patient reports she has been doing pretty well overall.  She has been working from home for the past 5 weeks, and this has been quite stressful.  She works very long hours.  Occasionally she will work until 3 AM.  She then still gets up at between 5 and 6 and goes to work again.  She has not been taking the time to exercise like she did when she was working outside the home.  She has noticed an increase in yeast infections with the use of Jardiance.  She has had 2 of these.  These resolved with over-the-counter treatment.  This was not an issue for her before, even when she had elevated blood sugars " in the past.  She is sending me some blood sugar readings, but is able to read some for me today.  Her morning fasting sugars are as follows: 250, 83, 171, 127, 175, 208, 141, 98, 108, 92, 81, 197, 126.  Her postmeal sugars are as follows: 140, 159, 223, 275, 190.  She is able to identify for her morning fasting sugars that when they are elevated, she has eaten late and eaten an unhealthy snack.  For instance, the morning she had a 250 blood sugar she ate mini donuts at 1 AM.  She also questions whether she should continue taking iron.  She has been taking this once daily.  She is no longer menstruating, has no bloody stools.  She otherwise feels well.  She has no other questions or concerns today.    Labs Reviewed:  Lab Results   Component Value Date    WBC 6.3 04/16/2020    HGB 13.6 04/16/2020    HCT 41.2 04/16/2020     04/16/2020    CHOL 134 11/22/2019    TRIG 93 11/22/2019    HDL 46 (L) 11/22/2019    LDLDIRECT 105 09/20/2011    ALT 78 (H) 01/18/2017    AST 42 (H) 01/18/2017     04/03/2019    K 4.5 04/03/2019     04/03/2019    CREATININE 0.84 04/03/2019    BUN 15 04/03/2019    CO2 23 04/03/2019    TSH 2.52 04/16/2020    HGBA1C 8.7 (H) 04/16/2020    MICROALBUR 0.77 04/16/2020       Objective:  Psychiatric: Speech is fluent and thought process is linear, good historian    Assessment/Plan:  1. Type 2 diabetes mellitus without complication, without long-term current use of insulin (H)  A1c remains higher than goal range, even with the addition of Jardiance.  She has been having some yeast infections.  She will keep me posted as to how these go.  We discussed that the next step would likely be mealtime insulin.  She will get back into getting some regular exercise, particularly after meals.  She will get better sleep, try to minimize stress.  She will change her bedtime snacks to something with protein, nuts, nut butters, yogurt, etc.  We will schedule another phone check in in 3 weeks with review  of her blood sugars at that time.  If no improvement, we will set up an appointment with diabetic education to discuss mealtime insulin.    2. Hypothyroidism, unspecified type  TSH is within goal range on current dose of levothyroxine.  No changes made at present.    3. Iron deficiency anemia, unspecified iron deficiency anemia type  Hemoglobin is normal, MCV remains slightly low.  Discussed taking iron about 3 days/week.  We can recheck this again with her A1c next time.        Phone call duration:  12 minutes    Carol Chavez MD

## 2021-06-07 NOTE — TELEPHONE ENCOUNTER
LMTCB on pts self identifying vm. Advised her she needs a office visit or virtual visit to go over her sx and diagnose.     PLEASE ASSIST THE PT IN SCHEDULING A OV OR VIRTUAL VISIT TO ADDRESS CONCERNS.   THANK YOU,   May Goss CMA

## 2021-06-07 NOTE — TELEPHONE ENCOUNTER
Spoke with patient and she has been using the OTC like she has in the past and it is helping so she will continue with that and call if needed.  Reminded her of her telephone appt. On 04/17/202.

## 2021-06-07 NOTE — TELEPHONE ENCOUNTER
2ND ATTEMPT TO CONTACT PT.   RUSSELL  Advised her she needs a office visit or virtual visit to go over her sx and diagnose.      PLEASE ASSIST THE PT IN SCHEDULING A OV OR VIRTUAL VISIT TO ADDRESS CONCERNS.   THANK YOU,   May Goss CMA

## 2021-06-07 NOTE — TELEPHONE ENCOUNTER
please have the patient do an E visit further delineating her symptoms so we can have this documented.    Thanks you    BB

## 2021-06-08 NOTE — PROGRESS NOTES
Assessment/Plan:     1. Routine general medical examination at a health care facility  Routine history and physical, updated in the EMR.  Fasting labs updated as below.  Pap smear and mammogram are up-to-date, as are immunizations and colonoscopy.  Plan repeat physical in one year.  - Comprehensive Metabolic Panel  - Glycosylated Hemoglobin A1c  - Thyroid Stimulating Hormone (TSH)  - Microalbumin, Random Urine  - HM2(CBC w/o Differential)    2. Type 2 diabetes mellitus without complication, without long-term current use of insulin  Not well-controlled on maximum doses of glipizide and metformin, also Januvia.  Recommended patient see diabetic education again to discuss diet, she recently started a new exercise program.  Will discuss with diabetic education starting patient on insulin versus possibly a GLP-1 agent.  Patient is resistant to starting insulin.  Would move fairly quickly to starting insulin if we don't see improvement in her blood sugars over the next 3 months.  - Comprehensive Metabolic Panel  - Glycosylated Hemoglobin A1c    3. Familial hypercholesterolemia  Tolerates simvastatin, lipid profile updated today.  - Comprehensive Metabolic Panel    4. Hypothyroidism, unspecified type  Continues on levothyroxine, TSH updated today.  - Thyroid Stimulating Hormone (TSH)    5. Anemia  Taking iron once daily for the past several months.  Plan recheck iron studies and hemoglobin today.  No longer having periods, so she may be able to taper off of this.  - Iron and Transferrin Iron Binding Capacity  - HM2(CBC w/o Differential)    6. Racing heart beat  Discussed need for ambulatory monitoring to further clarify this.  Patient would like to watch this symptom for a bit longer.  If this is consistent, she will let me know at which point would order some form of ambulatory monitoring.      Subjective:      Jeannine Rasheed is a 56 y.o. female who presents for an annual exam. The patient is sexually active. The  "patient participates in regular exercise: yes. The patient reports that there is not domestic violence in her life.  Patient has been feeling well, she recently started a new diet and exercise program.  She states the barrier in her diabetes control is her \"addiction to sweets\".  She does report an occasional sensation of racing heartbeat that does make her feel slightly lightheaded.  She has been interpreting this to high blood sugars.  She does not bring any blood sugar readings for me to review.  She states she has not been good about checking these.    Healthy Habits:   Regular Exercise: Yes  Sunscreen Use: Yes  Healthy Diet: Yes  Dental Visits Regularly: Yes  Seat Belt: Yes  Sexually active: Yes  Self Breast Exam Monthly:Yes  Colonoscopy: 14  Lipid Profile: Yes  Glucose Screen: Yes  Prevention of Osteoporosis: No      Immunization History   Administered Date(s) Administered     Influenza, inj, historic 10/05/2015     Tdap 2012     Immunization status: up to date and documented.    No exam data present    Gynecologic History  No LMP recorded.  Contraception: post menopausal status  Last Pap: 10/29/13. Results were: normal   Last mammogram: 16. Results were: normal      OB History    Para Term  AB SAB TAB Ectopic Multiple Living   2 2 2             # Outcome Date GA Lbr Kendrick/2nd Weight Sex Delivery Anes PTL Lv   2 Term            1 Term                   Current Outpatient Prescriptions   Medication Sig Dispense Refill     aspirin 81 MG EC tablet Take 81 mg by mouth daily.       ferrous sulfate 65 mg elemental iron Take 1 tablet by mouth once daily.       glipiZIDE (GLUCOTROL) 10 MG tablet Take 2 tablets (20 mg total) by mouth 2 (two) times a day before meals. 360 tablet 1     JANUVIA 100 mg tablet TAKE ONE TABLET (100 MG) BY MOUTH EVERY DAY WITH OR WITHOUT FOOD 90 tablet 0     levothyroxine (SYNTHROID, LEVOTHROID) 125 MCG tablet Take 1 tablet (125 mcg total) by mouth Daily at 6:00 " am. 90 tablet 0     lisinopril (PRINIVIL,ZESTRIL) 5 MG tablet TAKE 1 TABLET (5 MG) BY MOUTH ONCE DAILY AS DIRECTED 90 tablet 3     metFORMIN (GLUCOPHAGE) 1000 MG tablet TAKE 1 TABLET (1,000 MG) BY MOUTH 2 TIMES PER DAY WITH MORNING AND EVENING MEALS 180 tablet 0     ONE TOUCH ULTRA TEST Strp        simvastatin (ZOCOR) 40 MG tablet TAKE ONE TABLET DAILY AT BEDTIME. 90 tablet 3     UNABLE TO FIND once daily. Med Name: Linh ActivVishaun vitamin       No current facility-administered medications for this visit.      No past medical history on file.  Past Surgical History   Procedure Laterality Date     Pr revise median n/carpal tunnel surg       Description: Neuroplasty Decompression Median Nerve At Carpal Tunnel;  Recorded: 10/26/2010;     Pr corrj hallux valgus w/sesmdc w/2 osteot       Description: Hallux Valgus (Bunion) Correction;  Recorded: 10/29/2013;  Comments: age 13     Ankle fracture surgery Right 2005     Review of patient's allergies indicates no known allergies.  Family History   Problem Relation Age of Onset     Cancer Mother      lymphoma     Heart disease Father      bypass at 55     Heart disease Brother      Cancer Maternal Grandmother      cervical     Heart disease Paternal Grandmother      Breast cancer Neg Hx      Colon cancer Neg Hx      Diabetes Neg Hx      Social History     Social History     Marital status:      Spouse name: N/A     Number of children: N/A     Years of education: N/A     Occupational History     Not on file.     Social History Main Topics     Smoking status: Never Smoker     Smokeless tobacco: Not on file     Alcohol use 0.0 - 6.8 oz/week     0 - 4 Glasses of wine, 0 - 4 Cans of beer, 0 - 4 Standard drinks or equivalent per week     Drug use: Not on file     Sexual activity: Yes     Partners: Male     Other Topics Concern     Not on file     Social History Narrative       Review of Systems  General:  Denies problem  Eyes: Denies problem  Ears/Nose/Throat: Denies  "problem  Cardiovascular: Occasional episodes of \"racing heart\"  Respiratory:  Denies problem  Gastrointestinal:  Denies problem  Genitourinary: Denies problem  Musculoskeletal:  Denies problem  Skin: Denies problem  Neurologic: Denies problem  Psychiatric: Denies problem  Endocrine: Denies problem  Heme/Lymphatic: Denies problem   Allergic/Immunologic: Denies problem        Objective:         Vitals:    01/18/17 0808   BP: 108/64   Pulse: 64   Resp: 20   Temp: 98  F (36.7  C)   TempSrc: Oral   Weight: 163 lb 12.8 oz (74.3 kg)   Height: 5' 1\" (1.549 m)     Body mass index is 30.95 kg/(m^2).    Physical Exam:  General Appearance: Alert, cooperative, no distress, appears stated age  Head: Normocephalic, without obvious abnormality, atraumatic  Eyes: PERRL, conjunctiva/corneas clear, EOM's intact  Ears: Normal TM's and external ear canals, both ears  Nose: Nares normal, septum midline,mucosa normal, no drainage  Throat: Lips, mucosa, and tongue normal; teeth and gums normal  Neck: Supple, symmetrical, trachea midline, no adenopathy; thyroid: not enlarged, symmetric, no tenderness/mass/nodules  Back: Symmetric, no curvature, ROM normal, no CVA tenderness  Lungs: Clear to auscultation bilaterally, respirations unlabored  Breasts: No breast masses, tenderness, asymmetry, or nipple discharge  Heart: Regular rate and rhythm, S1 and S2 normal, no murmur, rub, or gallop  Abdomen: Soft, non-tender, bowel sounds active all four quadrants, no masses, no organomegaly  Pelvic:Not examined  Extremities: Extremities normal, atraumatic, no cyanosis or edema  Skin: Skin color, texture, turgor normal, no rashes or lesions  Lymph nodes: Cervical, supraclavicular, and axillary nodes normal  Neurologic: Normal, monofilament foot exam is normal bilaterally, moderate callus formation left forefoot        "

## 2021-06-08 NOTE — TELEPHONE ENCOUNTER
Left message to call back for: Yesenia  Information to relay to patient:  LMTCB. Please help Yesenia schedule a virtual visit with Dr. Chavez when she calls back

## 2021-06-08 NOTE — TELEPHONE ENCOUNTER
----- Message from Carol Chavez MD sent at 5/18/2020  1:28 PM CDT -----  Reminder to call Yesenia to see if she will reschedule.  She needs to have some blood sugars to review.  ----- Message -----  From: Norah Lepe RN  Sent: 5/18/2020   1:25 PM CDT  To: Carol Chavez MD    You can increase Jardiance to 25 mg - after that, if still no improvement she is looking at insulin  ----- Message -----  From: Carol Chavez MD  Sent: 5/18/2020   9:16 AM CDT  To: TRACY Canela,    See my last note, but things haven't been going too well for Yesenia.  She no-showed a phone f/u appt with me this morning.  Want to try to reach out to her?    MJ

## 2021-06-08 NOTE — PROGRESS NOTES
"Assessment: Yesenia is here today for a consult regarding her current diabetes management and well as consideration in initiating a GLP1. She arrived today accompanied by her . She had her meter with her  And her most current BG data was reviewed. She reported she has been taking all her diabetes medications as prescribed with no missed or skipped doses.    BG Summary: data reviewed was  - 1/10  FB, 155, 138, 207, 241, 260 241, 322  Pre lunch 207  Pre dinner 228  Post dinner 230,254    Nutritional Summary: per her report, Yesenia is eating 3 meals daily and has recently begun a new diet - \"the 21 day fix\" which entails building meals that fir into various containers. Reviewed with her was the importance of balance of nutrients throughout the day and Yesenia stated that she has been making sure she is eating  a combination of proteins, vegetables, including fats and carbohydrates. In reviewing her typical current dietary intake :  B: 1 cup oatmeal and premier protein shake   Black coffee  L: salad with turkey or chicken, hard boiled egg  Sometimes will have a turkey sandwich  D: protein vegetable - 30 -45 grams of CHO  Eats fruits ( 1 cup of berries or an apple between meals)  Advised her to include a CHO source with lunch each day and requested she have no less than 30 grams of carbohydrates with her meals- informed her that in eliminating / severely restricting carbs from her diet in conjunction with her medications could result in possible hypoglycemia  Reviewed with her the signs and symptoms of hypoglycemia and appropriate treatment - Yesenia was instructed to make sure she test her blood sugars should she experience and symptoms of lows    Activity: works out on treadmill 4x/week at work for 30 minutes and has recently begun exercising in evenings at home - she was informed that this may be reflective of her most current FBG readings (165, 155, 138)  She was encouraged to continue.    Plan: Initiate Trulicity " today as that was the GLP1 option within her formulary. Education was provided today to Yesenia on GLP1's MOA, administration, site rotation,dosages and possible side effects. Yesenia was started on 0.75 mg today while at clinic. Based upon A1c continue all current diabetes medications and will monitor BG closely to evaluate need for adjustments / changes.   Yesenia was instructed to check blood sugars 3 times daily - in addition if she experiences any symptoms of low BG.   Continue to eat 3 meals daily - monitor CHO intake and have no less than 30 grams intake per (ADA guidelines 45 -60 grams)  Continue exercise daily  Will follow up with blood sugars weekly     Subjective and Objective:      Jeannine Rasheed is referred by Dr Chavez for Diabetes Education.     Lab Results   Component Value Date    HGBA1C 11.4 (H) 01/18/2017       Visit Vitals     Wt 164 lb 14.4 oz (74.8 kg)     BMI 31.16 kg/m2       Current diabetes medications:  Metformin 1000 mg PO BID, Glipizide 20 mg PO BID, Januvia 100 mg PO QD    Goals       Medications            Take diabetes medications as prescribed   MET  1/25/2017            Monitoring            1. Check blood sugars 3  times each day   -when you wake up   -before a meal   -2 hours after that same meal  remember to bring meter and log book to all appointments SOMETIMES MET  1/25/2017            Nutrition             Eat 3 balanced meals each day - Monitor carb intake and limit to 3-4 choices (45-60 grams) per meal    Do not wait longer than 4-5 hours to eat something  SOMETIMES MET  1/25/2017                    Education:     Monitoring   Meter (per above goals): Assessed and Discussed  Monitoring: Assessed and Discussed  BG goals: Assessed and Discussed    Nutrition Management  Nutrition Management: Assessed and Discussed  Weight: Assessed and Discussed  Portions/Balance: Assessed and Discussed  Carb ID/Count: Assessed and Discussed  Label Reading: Not addressed  Heart Healthy Fats:  Discussed  Menu Planning: Assessed, Discussed and Needs instruction/review at follow-up  Dining Out: Assessed and Discussed  Physical Activity: Assessed, Discussed and Competent  Medications: Assessed, Discussed and Needs instruction/review at follow-up  Orals: Assessed and Discussed  Injected Medications: Assessed, Discussed, Literature provided and Patient returned demonstration   Storage/Exp:Assessed, Discussed and Literature provided   Site Rotation: Assessed, Discussed and Literature provided   Sites Assessed: no    Diabetes Disease Process: Assessed and Discussed    Acute Complications: Prevent, Detect, Treat:  Hypoglycemia: Assessed and Discussed  Hyperglycemia: Assessed and Discussed  Sick Days: Discussed  Driving: Not addressed    Chronic Complications  Foot Care:Not addressed  Skin Care: Not addressed  Eye: Not addressed  ABC: Not addressed  Teeth:Assessed and Discussed  Goal Setting and Problem Solving: Assessed and Discussed  Barriers: Assessed and Discussed  Psychosocial Adjustments: Assessed and Discussed      Time spent with the patient: 60 minutes for diabetes education and counseling.   Previous Education: yes  Visit Type:DSMT  Hours Remaining: patient instructed to check with insurance - codes provided      Norah Lepe RN BSN  Diabetes education  1/25/2017

## 2021-06-08 NOTE — TELEPHONE ENCOUNTER
2nd attempt to contact patient    Left message to call back for: Yesenia    Information to relay to patient:  LMTCANDREA. Please help Yesenia schedule a virtual visit with Dr. Chavez when she calls back

## 2021-06-10 NOTE — PROGRESS NOTES
"  Subjective:       Jeannine Rasheed is a 56 y.o. female who presents for follow-up of type 2 diabetes mellitus.  See previous note for details, this has not been under good control of late.  She recently started Trulicity, titrated up to a higher dose and has been on the higher dose for 3 weeks now.  She has been exercising most days per week for at least 30 minutes at a time, has been following a pretty strict low-carb, low sugar diet with good portion control.  She eats meals at approximately 7 AM, 11:30 AM, and 5 PM with a snack at 10 PM.  Her bedtime snack includes usually popcorn, a small dish of ice cream, a piece of peanut butter toast or a piece of fruit.  She has had no symptomatic low blood sugars.  She has been feeling well and has no specific questions or concerns today.  She is willing to do whatever it takes to get her diabetes under better control, even if this means use of insulin.    Blood Sugars Reviewed:  A.M. Fastin, 257, 238, 153, 190, 202, 204, 265, 209, 170  Pre-meal lunch: 216, 201, 182  2 hour postmeal: 234, 263, 277, 276    Labs Reviewed:  Lab Results   Component Value Date    WBC 4.3 2017    HGB 12.0 2017    HCT 36.8 2017     2017    CHOL 194 2017    TRIG 140 2017    HDL 51 2017    LDLDIRECT 105 2011    ALT 78 (H) 2017    AST 42 (H) 2017     2017    K 4.1 2017     2017    CREATININE 0.94 2017    BUN 13 2017    CO2 23 2017    TSH 1.16 2017    HGBA1C 11.4 (H) 2017    MICROALBUR <0.50 2017       The following portions of the patient's history were reviewed and updated as appropriate: allergies, current medications, past medical history and problem list.    Review of Systems   Pertinent items are noted in HPI.      Objective:      /68 (Patient Site: Left Arm, Patient Position: Sitting, Cuff Size: Adult Regular)  Pulse 72  Resp 16  Ht 5' 1\" (1.549 " m)  Wt 160 lb 4.8 oz (72.7 kg)  Breastfeeding? No  BMI 30.29 kg/m2    General appearance: alert, appears stated age and cooperative  Lungs: clear to auscultation bilaterally  Heart: regular rate and rhythm, S1, S2 normal, no murmur, click, rub or gallop  Extremities: extremities normal, atraumatic, no cyanosis or edema and Feet without significant callus formation, cracking or open sores  Neurologic: Monofilament foot exam normal bilaterally        Results for orders placed or performed in visit on 05/24/17   Glycosylated Hemoglobin A1c   Result Value Ref Range    Hemoglobin A1c 9.3 (H) 3.5 - 6.0 %          Assessment/Plan:      Type 2 diabetes mellitus without complication, without long-term current use of insulin  A1c remains well above goal range with good dietary habits and exercise.  I would recommend starting insulin at this point and patient is agreeable.  Will work with diabetic education to arrange this for patient.  - Glycosylated Hemoglobin A1c

## 2021-06-11 NOTE — TELEPHONE ENCOUNTER
RN cannot approve Refill Request    RN can NOT refill this medication PCP messaged that patient is overdue for Labs. Last office visit: 11/22/2019 Carol Chavez MD Last Physical: 4/3/2019 Last MTM visit: Visit date not found Last visit same specialty: 11/22/2019 Carol Chavez MD.  Next visit within 3 mo: Visit date not found  Next physical within 3 mo: Visit date not found      Dee Nice, [facility] Triage/Med Refill 9/19/2020    Requested Prescriptions   Pending Prescriptions Disp Refills     dulaglutide (TRULICITY) 1.5 mg/0.5 mL PnIj 6 mL 1     Sig: Inject 1.5 mg under the skin once a week.       Insulin/GLP-1 Refill Protocol Failed - 9/18/2020 11:51 AM        Failed - Creatinine done in last year     Creatinine   Date Value Ref Range Status   04/03/2019 0.84 0.60 - 1.10 mg/dL Final             Passed - Visit with PCP or prescribing provider visit in last 6 months     Last office visit with prescriber/PCP: Visit date not found OR same dept: 11/22/2019 Carol Chavez MD OR same specialty: 11/22/2019 Carol Chavez MD Last physical: Visit date not found Last MTM visit: Visit date not found     Next appt within 3 mo: Visit date not found  Next physical within 3 mo: Visit date not found  Prescriber OR PCP: Carol Chavez MD  Last diagnosis associated with med order: 1. Type 2 diabetes mellitus without complication, without long-term current use of insulin (H)  - dulaglutide (TRULICITY) 1.5 mg/0.5 mL PnIj; Inject 1.5 mg under the skin once a week.  Dispense: 6 mL; Refill: 1    If protocol passes may refill for 6 months if within 3 months of last provider visit (or a total of 9 months).              Passed - A1C in last 6 months     Hemoglobin A1c   Date Value Ref Range Status   04/16/2020 8.7 (H) 3.5 - 6.0 % Final               Passed - Microalbumin in last year     Microalbumin, Random Urine   Date Value Ref Range Status   04/16/2020 0.77 0.00 - 1.99 mg/dL Final                   Passed - Blood pressure in last year     BP Readings from Last 1 Encounters:   11/22/19 124/68

## 2021-06-11 NOTE — TELEPHONE ENCOUNTER
FYI - Status Update  Who is Calling: Patient  Update: I am out of medication and can not wait for [facility].   Okay to leave a detailed message?:  Yes

## 2021-06-11 NOTE — TELEPHONE ENCOUNTER
RN cannot approve Refill Request    RN can NOT refill this medication Protocol failed and NO refill given. Last office visit: 11/22/2019 Carol Chavez MD Last Physical: 4/3/2019 Last MTM visit: Visit date not found Last visit same specialty: 11/22/2019 Carol Chavez MD.  Next visit within 3 mo: Visit date not found  Next physical within 3 mo: Visit date not found      Danielle Veloz, [facility] Triage/Med Refill 9/17/2020    Requested Prescriptions   Pending Prescriptions Disp Refills     glipiZIDE (GLUCOTROL) 10 MG tablet 120 tablet 3     Sig: Take 2 tablets (20 mg total) by mouth 2 (two) times a day before meals.       Oral Hypoglycemics Refill Protocol Failed - 9/17/2020  1:03 PM        Failed - Serum creatinine in last year     Creatinine   Date Value Ref Range Status   04/03/2019 0.84 0.60 - 1.10 mg/dL Final             Passed - Visit with PCP or prescribing provider visit in last 6 months       Last office visit with prescriber/PCP: Visit date not found OR same dept: 11/22/2019 Carol Chavez MD OR same specialty: 11/22/2019 Carol Chavez MD Last physical: Visit date not found Last MTM visit: Visit date not found         Next appt within 3 mo: Visit date not found  Next physical within 3 mo: Visit date not found  Prescriber OR PCP: Carol Chavez MD  Last diagnosis associated with med order: 1. Diabetes mellitus, type 2 (H)  - glipiZIDE (GLUCOTROL) 10 MG tablet; Take 2 tablets (20 mg total) by mouth 2 (two) times a day before meals.  Dispense: 120 tablet; Refill: 3     If protocol passes may refill for 12 months if within 3 months of last provider visit (or a total of 15 months).           Passed - A1C in last 6 months     Hemoglobin A1c   Date Value Ref Range Status   04/16/2020 8.7 (H) 3.5 - 6.0 % Final               Passed - Microalbumin in last year      Microalbumin, Random Urine   Date Value Ref Range Status   04/16/2020 0.77 0.00 - 1.99 mg/dL Final                   Passed - Blood pressure in last year     BP Readings from Last 1 Encounters:   11/22/19 124/68                    (2) assistive person

## 2021-06-12 NOTE — PROGRESS NOTES
Assessment/Plan:     1. Routine general medical examination at a health care facility  Routine history and physical, updated in EMR.  Labs updated today.  Mammogram ordered, immunizations are up to date, as is colonoscopy.  Plan repeat physical in 1 year.    2. Type 2 diabetes mellitus without complication, without long-term current use of insulin (H)  Recommended increasing Jardiance to 20 mg daily.  Patient states she just picked up her 90-day supply of 10 mg tabs.  She will update me with blood sugar readings on the increased dose.  If blood sugars and A1c remain elevated, would consider meal time insulin next.  - empagliflozin (JARDIANCE) 10 mg Tab; Take 2 tablets (20 mg total) by mouth daily.  Dispense: 90 tablet; Refill: 0    3. Hypothyroidism, unspecified type  TSH updated in the spring and normal.    4. Familial hypercholesterolemia  Doing well on simvastatin.  Lipid profile within goal range today.    5. Plantar fasciitis  Discussed wearing supportive shoes as often as possible, going barefoot as little as possible.    6. Visit for screening mammogram  Mammogram ordered after discussion today.  - Mammo Screening Bilateral; Future      Subjective:      Jeannine Rasheed is a 60 y.o. female who presents for an annual exam. The patient is sexually active. The patient participates in regular exercise: yes. The patient reports that there is not domestic violence in her life.     1. Patient brings in blood sugar readings today for review.  The vast majority of morning fasting and pre and post meal sugars are above goal range.  Sugars overall are quite labile.  Post meal sugars are frequently >200.  She denies excessive thirst or frequent urination.  2. Left foot pain, particularly when getting out of bed.  No previous injury, no swelling, pain in the heel.    Healthy Habits:   Regular Exercise: Yes - exercise tapes for 30 minutes most days  Sunscreen Use: No  Healthy Diet: Yes  Dental Visits Regularly: Yes  Seat  Belt: Yes  Sexually active: Yes  Self Breast Exam Monthly:Yes  Colonoscopy: Yes - normal in   Lipid Profile: Yes  Glucose Screen: No  Prevention of Osteoporosis: No      Immunization History   Administered Date(s) Administered     Influenza, inj, historic,unspecified 10/05/2015, 10/10/2017, 10/01/2018     Influenza,seasonal,quad inj =/> 6months 2020     Tdap 2012     ZOSTER, RECOMBINANT, IM 2019, 2019     Immunization status: up to date and documented.    No exam data present    Gynecologic History  No LMP recorded. Patient is perimenopausal.  Contraception: post menopausal status  Last Pap: 18. Results were: normal and HPV neg  Last mammogram: 18. Results were: normal      OB History    Para Term  AB Living   2 2 2         SAB TAB Ectopic Multiple Live Births                  # Outcome Date GA Lbr Kendrick/2nd Weight Sex Delivery Anes PTL Lv   2 Term            1 Term                Current Outpatient Medications   Medication Sig Dispense Refill     aspirin 81 MG EC tablet Take 81 mg by mouth daily.       blood glucose test (ONETOUCH ULTRA TEST) strips Use 1 each As Directed 3 (three) times a day. 300 strip 3     COQ10, UBIQUINOL, ORAL Take by mouth.       dulaglutide (TRULICITY) 1.5 mg/0.5 mL PnIj Inject 1.5 mg under the skin once a week. 6 mL 1     empagliflozin (JARDIANCE) 10 mg Tab Take 1 tablet (10 mg total) by mouth daily. 90 tablet 0     glipiZIDE (GLUCOTROL) 10 MG tablet Take 2 tablets (20 mg total) by mouth 2 (two) times a day before meals. 120 tablet 1     levothyroxine (SYNTHROID, LEVOTHROID) 112 MCG tablet Take 1 tablet (112 mcg total) by mouth Daily at 6:00 am. 90 tablet 0     metFORMIN (GLUCOPHAGE) 1000 MG tablet Take 1 tablet (1,000 mg total) by mouth 2 (two) times a day with meals. 180 tablet 1     simvastatin (ZOCOR) 40 MG tablet Take 1 tablet (40 mg total) by mouth at bedtime. 90 tablet 3     ferrous sulfate 325 (65 FE) MG tablet Take 1 tablet (325  mg total) by mouth daily with breakfast. 90 tablet 3     No current facility-administered medications for this visit.      No past medical history on file.  Past Surgical History:   Procedure Laterality Date     ANKLE FRACTURE SURGERY Right 2005     AK CORRJ HALLUX VALGUS W/SESMDC W/2 OSTEOT      Description: Hallux Valgus (Bunion) Correction;  Recorded: 10/29/2013;  Comments: age 13     AK REVISE MEDIAN N/CARPAL TUNNEL SURG      Description: Neuroplasty Decompression Median Nerve At Carpal Tunnel;  Recorded: 10/26/2010;     Patient has no known allergies.  Family History   Problem Relation Age of Onset     Cancer Mother         lymphoma     Stroke Mother 69     Heart disease Father         bypass at 55     Heart disease Brother         CAD s/p stents     Cancer Maternal Grandmother         cervical     Heart disease Paternal Grandmother      Breast cancer Neg Hx      Colon cancer Neg Hx      Diabetes Neg Hx      Social History     Socioeconomic History     Marital status:      Spouse name: Not on file     Number of children: Not on file     Years of education: Not on file     Highest education level: Not on file   Occupational History     Not on file   Social Needs     Financial resource strain: Not on file     Food insecurity     Worry: Not on file     Inability: Not on file     Transportation needs     Medical: Not on file     Non-medical: Not on file   Tobacco Use     Smoking status: Never Smoker     Smokeless tobacco: Never Used   Substance and Sexual Activity     Alcohol use: Yes     Alcohol/week: 0.0 - 6.0 standard drinks     Drug use: No     Sexual activity: Yes     Partners: Male     Comment:  Isaias   Lifestyle     Physical activity     Days per week: Not on file     Minutes per session: Not on file     Stress: Not on file   Relationships     Social connections     Talks on phone: Not on file     Gets together: Not on file     Attends Jain service: Not on file     Active member of club or  "organization: Not on file     Attends meetings of clubs or organizations: Not on file     Relationship status: Not on file     Intimate partner violence     Fear of current or ex partner: Not on file     Emotionally abused: Not on file     Physically abused: Not on file     Forced sexual activity: Not on file   Other Topics Concern     Not on file   Social History Narrative     Not on file       Review of Systems  General:  Denies problem  Eyes: Denies problem  Ears/Nose/Throat: Denies problem  Cardiovascular: Denies problem  Respiratory:  Denies problem  Gastrointestinal:  Denies problem  Genitourinary: Denies problem  Musculoskeletal:  left foot pain as above  Skin: Denies problem  Neurologic: Denies problem  Psychiatric: Denies problem  Endocrine: Denies problem  Heme/Lymphatic: Denies problem   Allergic/Immunologic: Denies problem        Objective:         Vitals:    10/05/20 1402   Pulse: 71   Weight: 154 lb 12.8 oz (70.2 kg)   Height: 5' 0.8\" (1.544 m)     Body mass index is 29.44 kg/m .    Physical Exam:  General Appearance: Alert, cooperative, no distress, appears stated age  Head: Normocephalic, without obvious abnormality, atraumatic  Eyes: PERRL, conjunctiva/corneas clear, EOM's intact  Ears: Normal TM's and external ear canals, both ears  Nose: Nares normal, septum midline,mucosa normal, no drainage  Throat: Lips, mucosa, and tongue normal; teeth and gums normal  Neck: Supple, symmetrical, trachea midline, no adenopathy;  thyroid: not enlarged, symmetric, no tenderness/mass/nodules  Back: Symmetric, no curvature, ROM normal, no CVA tenderness  Lungs: Clear to auscultation bilaterally, respirations unlabored  Breasts: No breast masses, tenderness, asymmetry, or nipple discharge.  Heart: Regular rate and rhythm, S1 and S2 normal, no murmur, rub, or gallop  Abdomen: Soft, non-tender, bowel sounds active all four quadrants, no masses, no organomegaly  Pelvic:Not examined  Extremities: Extremities normal, " atraumatic, no cyanosis or edema  Skin: Skin color, texture, turgor normal, no rashes or lesions  Lymph nodes: Cervical and axillary nodes normal  Neurologic: Normal

## 2021-06-12 NOTE — TELEPHONE ENCOUNTER
Refill Request  Did you contact pharmacy: No  Medication name:   Requested Prescriptions     Pending Prescriptions Disp Refills     empagliflozin (JARDIANCE) 10 mg Tab 90 tablet 0     Sig: Take 1 tablet (10 mg total) by mouth daily.     Who prescribed the medication: Dr Gaviria  Requested Pharmacy: [facility]  Is patient out of medication: Yes,  States she has been out of this medication for a week. States she is making an appointment for her physical with provider.  Also needs a 90 day supply so insurance will cover med.  Please send to pharmacy today.    Patient notified refills processed in 3 business days:  yes  Okay to leave a detailed message: yes

## 2021-06-12 NOTE — TELEPHONE ENCOUNTER
Patient Returning Call  Reason for call:  Patient called back.  Information relayed to patient:  n/a  Patient has additional questions:  Yes  If YES, what are your questions/concerns:  Please have a covering provider fill this medication today per patients request as she is out of med.  Okay to leave a detailed message?: Yes

## 2021-06-12 NOTE — TELEPHONE ENCOUNTER
Dr. Chavez,   See pt's [employer] message. I was able to get her an 8am lab only appt at [facility]. Can you enter lab orders?  I will then let her know.

## 2021-06-12 NOTE — TELEPHONE ENCOUNTER
Who is calling:  Patient   Reason for Call:  Caller is calling to check on the status of refills. Caller stated that she has been out for weeks now and [facility] but was told that they didn't see any refills sent over. Caller is now needing to sent to [facility]  #[id number]  Date of last appointment with primary care:   Okay to leave a detailed message: Yes

## 2021-06-12 NOTE — TELEPHONE ENCOUNTER
Requested Prescriptions     Pending Prescriptions Disp Refills     glipiZIDE (GLUCOTROL) 10 MG tablet 120 tablet 1     Sig: Take 2 tablets (20 mg total) by mouth 2 (two) times a day before meals.

## 2021-06-13 NOTE — PROGRESS NOTES
"  Assessment/Plan:       1. Type 2 diabetes mellitus without complication, without long-term current use of insulin  Although A1c is much improved, patient believes she has maximized her lifestyle changes and is unsure she can sustain her current diet and activity level.  For this reason, she desires starting insulin.  Advised her to talk more about this with diabetic education at her upcoming visit, and she may start lantus and stop Januvia.  This may also help her in terms of cost of medications also.  Recommended follow-up in 1 month with some sugar readings if she makes a change in her regimen.  - metFORMIN (GLUCOPHAGE) 1000 MG tablet; take 1 tablet by mouth twice daily with meals  Dispense: 180 tablet; Refill: 3  - Glycosylated Hemoglobin A1c    2. Pain in multiple finger joints  Exam is most consistent with osteoarthritis in the fingers, but due to aching symptoms in mostly small joints, will attempt to rule out rheumatoid arthritis today with labs as below.  Discussed staying active and keeping joints mobile.  Also discussed referral to Orthopedics for steroid injection for the locking sensation in right finger.  - Rheumatoid Factor Quant  - CCP Antibodies  - Sedimentation Rate  - C-Reactive Protein(CRP)        Subjective:       Jeannine Rasheed is a 57 y.o. female who presents for primarily follow-up of type II diabetes mellitus.  Since her last visit, patient has started Trulicity.  She continues to try to eat a healthy diet and get regular exercise, but admits that this is getting harder to maintain.  She also lost her meter for over a month, just found it and started checking blood sugars again this week.  She brings in her meter, but no record book with her today.  Second, patient says that she has been experiencing a \"locking\" sensation to her right 3rd finger, along with pain at the base of both thumbs.  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  She wakes with stiffness and soreness of her " "hands.    AM fasting sugars: 148, 100, 147, 136, 221, 134, 118    Labs Reviewed:  Lab Results   Component Value Date    WBC 4.3 01/18/2017    HGB 12.0 01/18/2017    HCT 36.8 01/18/2017     01/18/2017    CHOL 194 01/18/2017    TRIG 140 01/18/2017    HDL 51 01/18/2017    LDLDIRECT 105 09/20/2011    ALT 78 (H) 01/18/2017    AST 42 (H) 01/18/2017     01/18/2017    K 4.1 01/18/2017     01/18/2017    CREATININE 0.94 01/18/2017    BUN 13 01/18/2017    CO2 23 01/18/2017    TSH 1.16 01/18/2017    HGBA1C 9.3 (H) 05/24/2017    MICROALBUR <0.50 01/18/2017       The following portions of the patient's history were reviewed and updated as appropriate: allergies, current medications, past medical history and problem list.      Current Outpatient Prescriptions:      aspirin 81 MG EC tablet, Take 81 mg by mouth daily., Disp: , Rfl:      glipiZIDE (GLUCOTROL) 10 MG tablet, Take 2 tablets (20 mg total) by mouth 2 (two) times a day before meals., Disp: 360 tablet, Rfl: 1     JANUVIA 100 mg tablet, TAKE1 TABLET BY MOUTH ONCE DAILY, Disp: 90 tablet, Rfl: 0     levothyroxine (SYNTHROID, LEVOTHROID) 125 MCG tablet, Take 1 tablet (125 mcg total) by mouth Daily at 6:00 am., Disp: 90 tablet, Rfl: 3     metFORMIN (GLUCOPHAGE) 1000 MG tablet, take 1 tablet by mouth twice daily with meals, Disp: 180 tablet, Rfl: 0     ONE TOUCH ULTRA TEST Strp, , Disp: , Rfl:      simvastatin (ZOCOR) 40 MG tablet, take 1 tablet by mouth once daily at bedtime, Disp: 90 tablet, Rfl: 1     UNABLE TO FIND, once daily. Med Name: [employer] ActivVit vitamin, Disp: , Rfl:      TRULICITY 1.5 mg/0.5 mL PnIj, Inject 0.5 mL (1.5 mg) under the skin once every 7 days, Disp: 6 mL, Rfl: 0    Review of Systems   Pertinent items are noted in HPI.      Objective:      /68 (Patient Site: Left Arm, Patient Position: Sitting, Cuff Size: Adult Regular)  Pulse 72  Resp 16  Ht 5' 1\" (1.549 m)  Wt 157 lb (71.2 kg)  Breastfeeding? No  BMI 29.66 " kg/m2    General appearance: alert, appears stated age and cooperative  Head: Normocephalic, without obvious abnormality, atraumatic  Eyes: conjunctivae clear, sclerae anicteric  Lungs: clear to auscultation bilaterally  Heart: regular rate and rhythm, S1, S2 normal, no murmur, click, rub or gallop  Extremities: hands and fingers normal without obvious swelling or nodularity, no triggering of right 3rd finger on exam today  Monofilament foot exam is normal bilaterally        Results for orders placed or performed in visit on 10/18/17   Glycosylated Hemoglobin A1c   Result Value Ref Range    Hemoglobin A1c 7.8 (H) 3.5 - 6.0 %   Rheumatoid Factor Quant   Result Value Ref Range    Rheumatoid Factor Quantitative <15.0 0 - 30 IU/mL   CCP Antibodies   Result Value Ref Range    CCP IgG Antibodies <0.5 <=4.9 U/mL   Sedimentation Rate   Result Value Ref Range    Sed Rate 21 (H) 0 - 20 mm/hr   C-Reactive Protein(CRP)   Result Value Ref Range    CRP 0.1 0.0 - 0.8 mg/dL

## 2021-06-13 NOTE — TELEPHONE ENCOUNTER
Refill Approved    Rx renewed per Medication Renewal Policy. Medication was last renewed on 1/11/20.    Danielle Veloz, [facility] Triage/Med Refill 12/14/2020     Requested Prescriptions   Pending Prescriptions Disp Refills     simvastatin (ZOCOR) 40 MG tablet [Pharmacy Med Name: SIMVASTATIN TABS 40MG] 90 tablet 3     Sig: TAKE 1 TABLET AT BEDTIME       Statins Refill Protocol (Hmg CoA Reductase Inhibitors) Passed - 12/13/2020 11:09 PM        Passed - PCP or prescribing provider visit in past 12 months      Last office visit with prescriber/PCP: 11/22/2019 Carol Chavez MD OR same dept: Visit date not found OR same specialty: 11/22/2019 Carol Chavez MD  Last physical: 10/5/2020 Last MTM visit: Visit date not found   Next visit within 3 mo: Visit date not found  Next physical within 3 mo: Visit date not found  Prescriber OR PCP: Carol Chavez MD  Last diagnosis associated with med order: 1. Hyperlipidemia  - simvastatin (ZOCOR) 40 MG tablet [Pharmacy Med Name: SIMVASTATIN TABS 40MG]; TAKE 1 TABLET AT BEDTIME  Dispense: 90 tablet; Refill: 3    2. Type 2 diabetes mellitus without complication, without long-term current use of insulin (H)  - ONETOUCH ULTRA BLUE TEST STRIP strips [Pharmacy Med Name: ONE TOUCH ULTRA STRIPS 100'S]; USE 1 STRIP AS DIRECTED THREE TIMES A DAY; Refill: 3    If protocol passes may refill for 12 months if within 3 months of last provider visit (or a total of 15 months).                ONETOUCH ULTRA BLUE TEST STRIP strips [Pharmacy Med Name: ONE TOUCH ULTRA STRIPS 100'S]  3     Sig: USE 1 STRIP AS DIRECTED THREE TIMES A DAY       Diabetic Supplies Refill Protocol Passed - 12/13/2020 11:09 PM        Passed - Visit with PCP or prescribing provider visit in last 6 months     Last office visit with prescriber/PCP: 11/22/2019 Carol Chavez MD OR same dept: Visit date not found OR same specialty: 11/22/2019 Carol Chavez MD  Last physical: 10/5/2020 Last  MTM visit: Visit date not found   Next visit within 3 mo: Visit date not found  Next physical within 3 mo: Visit date not found  Prescriber OR PCP: Carol Chavez MD  Last diagnosis associated with med order: 1. Hyperlipidemia  - simvastatin (ZOCOR) 40 MG tablet [Pharmacy Med Name: SIMVASTATIN TABS 40MG]; TAKE 1 TABLET AT BEDTIME  Dispense: 90 tablet; Refill: 3    2. Type 2 diabetes mellitus without complication, without long-term current use of insulin (H)  - ONETOUCH ULTRA BLUE TEST STRIP strips [Pharmacy Med Name: ONE TOUCH ULTRA STRIPS 100'S]; USE 1 STRIP AS DIRECTED THREE TIMES A DAY; Refill: 3    If protocol passes may refill for 12 months if within 3 months of last provider visit (or a total of 15 months).             Passed - A1C in last 6 months     Hemoglobin A1c   Date Value Ref Range Status   10/05/2020 7.8 (H) <=5.6 % Final     Comment:     Normal <5.7% Prediabete 5.7-6.4% Diabletes 6.5% or higher - adopted from ADA consensus guidelines

## 2021-06-13 NOTE — TELEPHONE ENCOUNTER
RN cannot approve Refill Request    RN can NOT refill this medication Protocol failed and NO refill given. Last office visit: 11/22/2019 Carol Chavez MD Last Physical: 10/5/2020 Last MTM visit: Visit date not found Last visit same specialty: 11/22/2019 Carol Chavez MD.  Next visit within 3 mo: Visit date not found  Next physical within 3 mo: Visit date not found      Danielle Veloz, [facility] Triage/Med Refill 11/20/2020    Requested Prescriptions   Pending Prescriptions Disp Refills     metFORMIN (GLUCOPHAGE) 1000 MG tablet [Pharmacy Med Name: METFORMIN HCL TABS 1000MG] 180 tablet 3     Sig: TAKE 1 TABLET TWICE A DAY WITH MEALS       Metformin Refill Protocol Failed - 11/19/2020 11:11 PM        Failed - LFT or AST or ALT in last 12 months     Albumin   Date Value Ref Range Status   01/18/2017 4.1 3.5 - 5.0 g/dL Final     Bilirubin, Total   Date Value Ref Range Status   01/18/2017 0.4 0.0 - 1.0 mg/dL Final     Alkaline Phosphatase   Date Value Ref Range Status   01/18/2017 51 45 - 120 U/L Final     AST   Date Value Ref Range Status   01/18/2017 42 (H) 0 - 40 U/L Final     ALT   Date Value Ref Range Status   01/18/2017 78 (H) 0 - 45 U/L Final     Protein, Total   Date Value Ref Range Status   01/18/2017 7.1 6.0 - 8.0 g/dL Final                Passed - Blood pressure in last 12 months     BP Readings from Last 1 Encounters:   11/22/19 124/68             Passed - GFR or Serum Creatinine in last 6 months     GFR MDRD Non Af Amer   Date Value Ref Range Status   10/05/2020 >60 >60 mL/min/1.73m2 Final     GFR MDRD Af Amer   Date Value Ref Range Status   10/05/2020 >60 >60 mL/min/1.73m2 Final             Passed - Visit with PCP or prescribing provider visit in last 6 months or next 3 months     Last office visit with prescriber/PCP: Visit date not found OR same dept: 11/22/2019 Carol Chavez MD OR same specialty: 11/22/2019 Carol Chavez MD Last physical: 10/5/2020 Last MTM visit:  Visit date not found         Next appt within 3 mo: Visit date not found  Next physical within 3 mo: Visit date not found  Prescriber OR PCP: Carol Chavez MD  Last diagnosis associated with med order: 1. Type 2 diabetes mellitus without complication, without long-term current use of insulin (H)  - metFORMIN (GLUCOPHAGE) 1000 MG tablet [Pharmacy Med Name: METFORMIN HCL TABS 1000MG]; TAKE 1 TABLET TWICE A DAY WITH MEALS  Dispense: 180 tablet; Refill: 3     If protocol passes may refill for 12 months if within 3 months of last provider visit (or a total of 15 months).           Passed - A1C in last 6 months     Hemoglobin A1c   Date Value Ref Range Status   10/05/2020 7.8 (H) <=5.6 % Final     Comment:     Normal <5.7% Prediabete 5.7-6.4% Diabletes 6.5% or higher - adopted from ADA consensus guidelines               Passed - Microalbumin in last year      Microalbumin, Random Urine   Date Value Ref Range Status   04/16/2020 0.77 0.00 - 1.99 mg/dL Final

## 2021-06-14 NOTE — TELEPHONE ENCOUNTER
Reason for Call:  Medication Refill    Do you use a [facility]?  Name of the pharmacy and phone number for the current request: [facility] in [place] on [address]    Name of the medication requested: empagliflozin 25 mg and acyclovir 400 mg tablet    Other request: Patient will be out of town Friday morning and needs refills ASAP before she leaves. She would like a call back once Rx are sent to pharmacy.    Can we leave a detailed message on this number? Yes    Phone number patient can be reached at:   Cell number on file:    Telephone Information:   Mobile [phone]       Best Time: Any time    Call taken on 1/20/2021 at 9:08 AM by Christiane Douglass

## 2021-06-14 NOTE — TELEPHONE ENCOUNTER
Spoke to pt and scheduled DM check. She also said she takes the acyclovir for cold sores and has a cold sore starting currently so would like to take med asap

## 2021-06-15 NOTE — PATIENT INSTRUCTIONS - HE
"1. Eat 3 balanced meals each day - Monitor carb intake and limit to 45-60 grams per meal  This would be equal to 3-4 choices ~  1 choice = 15 grams    Do not wait longer than 4-5 hours to eat something  Snacks limit to no more than 30 grams of carbohydrates or 2 choices  Make sure you include protein source with each meal and at bedtime - this has been shown to help with blood glucose elevations    2. Check blood sugars 2  times each day  - when you wake up   - 2 hours AFTER dinner    Fasting and before meal target is 80 - 130   2 hours after a meal target is < 180  remember to bring meter and log book to all appointments    3. Incorporate 30 minutes activity into each day - does not need to be all at one time & walking counts    4. Take diabetes medications as prescribed you will be starting a new medication called Lantus  It is a type of insulin that is long acting - and is administered by injection once daily   You will start with 12 units - inject under the skin once each day at the same time each day   Increase dose by units every 3 days until your morning fasting blood sugars are <130 - then stay at that dose  Insulin is best absorbed in abdomen - avoid 2\" around your belly button  Remember to rotate your injection sites and use a new needle with each injection  Unused pens keep refrigerated - once you use a pen it is okay to leave it out at room temperature  Pens  after 28 days    Continue Metformin 1000 mg twice daily, trulicity weekly and restart Jardiance 25 mg daily       "

## 2021-06-15 NOTE — TELEPHONE ENCOUNTER
Prior Authorization Not Needed     Insurance details: I received a phone call from the other insurance on file and it looks like [facility] is not billing them, so they are not seeing a claim and a PA through them cannot be processed, but it looks like she mostly uses [facility] for her Rx. I will sign the request as the patient did speak to [facility].    Pharmacy details: [facility] - [facility], [address]

## 2021-06-15 NOTE — TELEPHONE ENCOUNTER
Date: 2/24/2021 Status: [facility]   Time: 11:30 AM Length: 30   Visit Type: OFFICE VISIT [[id number]] Copay: $0.00   Provider: Norah Lepe RN

## 2021-06-15 NOTE — TELEPHONE ENCOUNTER
Patient called to follow up on the PA. Communicated to patient that message has been sent to our PA group and they will work on it soon. Patient is very anxious and wanting Dr. Chavez's nurse Alicia to call her.

## 2021-06-15 NOTE — TELEPHONE ENCOUNTER
Left message to call back for: Yesenia  Information to relay to patient:  LMTCB or check mychart message    Please assist patient in scheduling a virtual visit with Dr Chavez this Wednesday morning if patient is able 03/17/21    Marcela Mensah CMA

## 2021-06-15 NOTE — PROGRESS NOTES
"Jeannine Rasheed is a 60 y.o. female who is being evaluated via a billable video visit.      How would you like to obtain your AVS? MyChart.  If dropped from the video visit, the video invitation should be resent by: Text to cell phone: [phone]  Will anyone else be joining your video visit? No      Video Start Time: 11:27 AM    Assessment & Plan     Type 2 diabetes mellitus with hyperglycemia, without long-term current use of insulin (H)  Patient has not been taking Jardiance, and her blood sugars fluctuate greatly in the morning, meal-time sugars appear in goal range, but there are not many readings to review.  Encouraged patient to check morning fasting and 2-hour postmeal sugars for the next 2 weeks and send these to me.  In the meantime, to simplify her regimen, we will take Jardiance off her med list and also stop glipizide.  Will increase Trulicity to 3 mg weekly.  - dulaglutide (TRULICITY) 3 mg/0.5 mL PnIj; Inject 3 mg under the skin every 7 days.  - Glycosylated Hemoglobin A1c; Future  - Thyroid Stimulating Hormone (TSH); Future       BMI:   Estimated body mass index is 29.44 kg/m  as calculated from the following:    Height as of 10/5/20: 5' 0.8\" (1.544 m).    Weight as of 10/5/20: 154 lb 12.8 oz (70.2 kg).     Return in about 2 weeks (around 2/23/2021) for Recheck diabetes (send readings via [employer]).    Carol Chavez MD  [facility]    Subjective   Jeannine Rasheed is 60 y.o. and presents today for the following health issues   HPI     Patient presents today for follow-up of type II diabetes mellitus.  She reports that she recently got back into town, was out of town from 1/22 to 2/1, and did not take her Jardiance with her.  She has been off this medicine since sometime in December she thinks.  She continues on 1.5 mg of Trulicity weekly.  She reported to diabetic educator Norah Lepe via message that she was repeatedly becoming ill on Mondays after taking her " Trulicity injection on Sunday.  She was asked to check with her insurance about whether another GLP-1 would be covered.  She said that she called her insurance and was told that with a doctor's prescription, another med would be covered.  She has felt ill only twice since stopping her Jardiance.  She sends blood sugar readings after the visit, and these vary widely.  Her pre or post-meal sugars are all within goal range, but her morning fasting sugars can be anywhere from the 60's to 300's, with an average probably in the 160 or 180 range.  She has been feeling well, has no other questions or concerns today.    Review of Systems  Negative except as noted above      Objective    Vitals - Patient Reported  Weight (Patient Reported): 151 lb (68.5 kg)    Physical Exam  General: well-developed, well-nourished female, no acute distress  Neurologic: alert and oriented, good historian    Labs Reviewed:  Lab Results   Component Value Date    WBC 6.3 04/16/2020    HGB 13.6 04/16/2020    HCT 41.2 04/16/2020     04/16/2020    CHOL 156 10/05/2020    TRIG 143 10/05/2020    HDL 49 (L) 10/05/2020    LDLDIRECT 105 09/20/2011    ALT 78 (H) 01/18/2017    AST 42 (H) 01/18/2017     10/05/2020    K 4.6 10/05/2020     10/05/2020    CREATININE 0.87 10/05/2020    BUN 16 10/05/2020    CO2 25 10/05/2020    TSH 2.52 04/16/2020    HGBA1C 7.8 (H) 10/05/2020    MICROALBUR 0.77 04/16/2020       Results for orders placed or performed in visit on 10/05/20   Glycosylated Hemoglobin A1c   Result Value Ref Range    Hemoglobin A1c 7.8 (H) <=5.6 %   Lipid Cascade FASTING   Result Value Ref Range    Cholesterol 156 <=199 mg/dL    Triglycerides 143 <=149 mg/dL    HDL Cholesterol 49 (L) >=50 mg/dL    LDL Calculated 78 <=129 mg/dL    Patient Fasting > 8hrs? Yes    Basic Metabolic Panel   Result Value Ref Range    Sodium 142 136 - 145 mmol/L    Potassium 4.6 3.5 - 5.0 mmol/L    Chloride 106 98 - 107 mmol/L    CO2 25 22 - 31 mmol/L    Anion  Gap, Calculation 11 5 - 18 mmol/L    Glucose 138 (H) 70 - 125 mg/dL    Calcium 10.0 8.5 - 10.5 mg/dL    BUN 16 8 - 22 mg/dL    Creatinine 0.87 0.60 - 1.10 mg/dL    GFR MDRD Af Amer >60 >60 mL/min/1.73m2    GFR MDRD Non Af Amer >60 >60 mL/min/1.73m2             Video-Visit Details    Type of service:  Video Visit    Video End Time (time video stopped): 11:45 AM  Originating Location (pt. Location): Home    Distant Location (provider location):  [facility]     [facility]

## 2021-06-15 NOTE — TELEPHONE ENCOUNTER
Central PA team  [phone]  Pool: HE PA MED ([id number])          PA has been initiated.       PA form completed and faxed insurance via Cover My Meds     Key:  Initiated over the phone with PA Rep from [employer]     Medication:  dulaglutide (TRULICITY) 3 mg/0.5 mL PnIj    Insurance:  [facility]        Response will be received via fax and may take up to 5-10 business days depending on plan

## 2021-06-15 NOTE — TELEPHONE ENCOUNTER
----- Message from Carol Chavez MD sent at 3/15/2021  8:25 AM CDT -----  Regarding: FW: Yesenia SAINI  It sounds like we're rescheduling our staff meeting on Wed AM -- can you see if Yesenia would be willing to schedule a diabetes f/u virtually that morning to discuss some possible changes?  ----- Message -----  From: Norah Lepe RN  Sent: 3/15/2021   8:06 AM CDT  To: Carol Chavez MD  Subject: Yesenia SAINI                                            Got your message - yes, fastings are coming down nicely - you could even go up to 16 if you wanted and be okay  As for those post meals ... overall they are looking better too - we definitely could try the semaglutide - I wonder how sick she is getting ? Is she queazy for a few hrs after or miserable all next day ? Aw heck, why don't we just switch it to the semaglutide - I did have this once before with a pt awhile back and changing to the Ozempic did make a difference (it's a different acting molecule I was told...)   I say up to 16 of the Lantus and try Ozempic - have her do 0.25 mg for 2 weeks then increase to 0.5 mg weekly   Hope this helps - if there is anything else let me know  Norah :)

## 2021-06-15 NOTE — TELEPHONE ENCOUNTER
Refill Request  Did you contact pharmacy: Yes  Medication name:   Requested Prescriptions      No prescriptions requested or ordered in this encounter     Who prescribed the medication: PCP  Requested Pharmacy: [facility]  Is patient out of medication: Yes  Patient notified refills processed in 3 business days:  yes  Okay to leave a detailed message: yes

## 2021-06-15 NOTE — TELEPHONE ENCOUNTER
Central PA team  [phone]  Pool: HE PA MED ([id number])          PA has been initiated.       PA form completed and faxed insurance via Cover My Meds     Key:  PEREZ CLAROS (Alan: [id number])      Medication:  rulicity 3MG/0.5ML pen-injectors    Insurance:  [employer] Call-in FormUsed to document patient and prescription details when calling [employer] to initiate a prior authorization PHONE: [phone]     Response will be received via fax and may take up to 5-10 business days depending on plan

## 2021-06-15 NOTE — TELEPHONE ENCOUNTER
Reason for Call:  Other prescription     Detailed comments: Patient called needing Dr. Chavez to send the lantus solostar pen to [facility] on [address] and [place]. She will not have the Rx by Wed when she sees the diabetic educator. Patient would like a call back once this is sent to [facility] so she can go pick it up and have it ready by Wed at her appt.    Phone Number Patient can be reached at:   Cell number on file:    Telephone Information:   Mobile [phone]       Best Time: Anytime    Can we leave a detailed message on this number?: Yes    Call taken on 2/22/2021 at 10:59 AM by Christiane Douglass

## 2021-06-16 PROBLEM — M72.2 PLANTAR FASCIITIS: Status: ACTIVE | Noted: 2020-09-03

## 2021-06-16 PROBLEM — M65.30 TRIGGER FINGER, ACQUIRED: Status: ACTIVE | Noted: 2020-06-01

## 2021-06-16 NOTE — TELEPHONE ENCOUNTER
Telephone Encounter by Jaye Ca at 1/10/2020 11:30 AM     Author: Jaye Ca Service: -- Author Type: --    Filed: 1/10/2020 11:30 AM Encounter Date: 1/10/2020 Status: Signed    : Jaye Ca APPROVED:    Approval start date: 01/01/2020  Approval end date:  01/30/2021    Pharmacy has been notified of approval and will contact patient when medication is ready for pickup.

## 2021-06-16 NOTE — TELEPHONE ENCOUNTER
Telephone Encounter by Jaye Ca at 1/10/2020 11:28 AM     Author: Jaye Ca Service: -- Author Type: --    Filed: 1/10/2020 11:29 AM Encounter Date: 1/10/2020 Status: Signed    : Jaye Ca APPROVED:    Approval start date: 01/01/2020  Approval end date:  01/30/2023    Pharmacy has been notified of approval and will contact patient when medication is ready for pickup.

## 2021-06-16 NOTE — PROGRESS NOTES
"Jeannine Rasheed is a 60 y.o. female who is being evaluated via a billable video visit.      How would you like to obtain your AVS? MyChart.  If dropped from the video visit, the video invitation should be resent by: Text to cell phone: [phone]  Will anyone else be joining your video visit? No      Video Start Time: 9:08 AM    Assessment & Plan     Type 2 diabetes mellitus with hyperglycemia, with long-term current use of insulin (H)  Because patient has nausea and vomiting the day after her GLP-1 injection, we will try changing to another GLP-1.  She will try Ozempic starting at a dose of 0.25 mg for 2 weeks, then increasing to 0.5 mg weekly thereafter.  I did send this to mail order pharmacy for her.  She will let me know if she needs a prior authorization.  Also instructed her to increase her Lantus to 16 units daily.  We discussed what would qualify for low blood sugars.  She will update me with blood sugar readings after she has been on the full dose of Ozempic for a couple of weeks.  - semaglutide (OZEMPIC) 0.25 mg or 0.5 mg(2 mg/1.5 mL) PnIj; Inject 0.25 mg under the skin every 7 days. For 2 weeks, then increase to 0.5 mg every 7 days.    Discussion of management or test interpretation with external physician/other qualified healthcare professional/appropriate source - diabetic educator Norah Lepe     BMI:   Estimated body mass index is 29.37 kg/m  as calculated from the following:    Height as of 10/5/20: 5' 0.8\" (1.544 m).    Weight as of 2/24/21: 154 lb 6.4 oz (70 kg).     Return in about 4 weeks (around 4/14/2021) for Recheck blood sugars (send readings via [email or web address]).    Carol Chavez MD  [facility]    Subjective   Jeannine Rasheed is 60 y.o. and presents today for the following health issues   HPI     Patient presents today in follow-up of type 2 diabetes mellitus.  She sent blood sugar readings, primarily premeal breakfast and bedtime blood sugars from 2/14 " through 3/9/2021.  She only had 3 morning fasting sugars that were within goal range.  She did have 3 bedtime sugars that were within goal range as well.  She has been using 14 units of Lantus along with Trulicity and Jardiance.  The day after taking her Trulicity, she experiences nausea, abdominal pain, and has one episode of vomiting.  She did not experience this on Trulicity alone, this started with the addition of Jardiance.  She has had no symptomatic low blood sugars.    Review of Systems  Negative except as noted above      Objective       Vitals:  No vitals were obtained today due to virtual visit.    Physical Exam  General: Well-developed well-nourished female, no acute distress  Neurologic: Alert and oriented, good historian        Video-Visit Details    Type of service:  Video Visit    Video End Time (time video stopped): 9:21 AM  Originating Location (pt. Location): Home    Distant Location (provider location):  [facility]     Platform used for Video Visit: [email or web address]

## 2021-06-16 NOTE — TELEPHONE ENCOUNTER
Refill Approved    Rx renewed per Medication Renewal Policy. Medication was last renewed on 4/7/20, last OV 3/17/21    Dee Nice, [facility] Triage/Med Refill 4/2/2021     Requested Prescriptions   Pending Prescriptions Disp Refills     levothyroxine (SYNTHROID, LEVOTHROID) 112 MCG tablet [Pharmacy Med Name: L-THYROXINE (SYNTHROID) TABS 112MCG] 90 tablet 3     Sig: TAKE 1 TABLET DAILY AT 6:00 A.M.       Thyroid Hormones Protocol Passed - 4/1/2021 11:09 PM        Passed - Provider visit in past 12 months or next 3 months     Last office visit with prescriber/PCP: 11/22/2019 Carol Chavez MD OR same dept: Visit date not found OR same specialty: 11/22/2019 Carol Chavez MD  Last physical: 10/5/2020 Last MTM visit: Visit date not found   Next visit within 3 mo: Visit date not found  Next physical within 3 mo: Visit date not found  Prescriber OR PCP: Carol Chavez MD  Last diagnosis associated with med order: 1. Hypothyroidism, unspecified type  - levothyroxine (SYNTHROID, LEVOTHROID) 112 MCG tablet [Pharmacy Med Name: L-THYROXINE (SYNTHROID) TABS 112MCG]; TAKE 1 TABLET DAILY AT 6:00 A.M.  Dispense: 90 tablet; Refill: 3    If protocol passes may refill for 12 months if within 3 months of last provider visit (or a total of 15 months).             Passed - TSH on file in past 12 months for patient age 12 & older     TSH   Date Value Ref Range Status   02/11/2021 1.00 0.30 - 5.00 uIU/mL Final

## 2021-06-17 NOTE — TELEPHONE ENCOUNTER
Telephone Encounter by Jorge Luis Guzman at 2/11/2021  9:35 AM     Author: Jorge Luis Guzman Service: -- Author Type: Patient Access    Filed: 2/11/2021  9:38 AM Encounter Date: 2/11/2021 Status: Signed    : Jorge Luis Guzman (Patient Access)       PA APPROVED:    Approval start date: 01/12/21  Approval end date:  02/11/24    Per information in approval - the patient may now contact [facility] to have the medication filled and sent out to her by contacting the [facility]

## 2021-06-17 NOTE — TELEPHONE ENCOUNTER
RN cannot approve Refill Request    RN can NOT refill this medication Protocol failed and NO refill given. Last office visit: Visit date not found Last Physical: Visit date not found Last MTM visit: Visit date not found Last visit same specialty: 11/22/2019 Carol Chavez MD.  Next visit within 3 mo: Visit date not found  Next physical within 3 mo: Visit date not found      Dee Nice, [facility] Triage/Med Refill 5/14/2021    Requested Prescriptions   Pending Prescriptions Disp Refills     JARDIANCE 10 mg Tab tablet [Pharmacy Med Name: JARDIANCE TABS 10MG] 90 tablet 3     Sig: TAKE 1 TABLET DAILY       There is no refill protocol information for this order

## 2021-06-17 NOTE — TELEPHONE ENCOUNTER
Telephone Encounter by Ashlyn Jeronimo at 2/9/2021  1:50 PM     Author: Ashlyn Jeronimo Service: -- Author Type: --    Filed: 2/9/2021  1:50 PM Encounter Date: 2/9/2021 Status: Signed    : Ashlyn Jeronimo

## 2021-06-19 NOTE — LETTER
Letter by Carol Chavez MD at      Author: Carol Chavez MD Service: -- Author Type: --    Filed:  Encounter Date: 8/30/2019 Status: (Other)         Jeannine Rasheed  [address] [facility] [address]      August 30, 2019      Dear Ms. Rasheed,    Here is a list of your current medications.    aspirin 81 MG EC tablet 81 mg, Oral, DAILY        glipiZIDE (GLUCOTROL) 10 MG tablet        levothyroxine (SYNTHROID, LEVOTHROID) 125 MCG tablet        metFORMIN (GLUCOPHAGE) 1000 MG tablet        ONETOUCH ULTRA TEST strips 1 each, Miscellaneous, 3 times daily       simvastatin (ZOCOR) 40 MG tablet        TRULICITY 1.5 mg/0.5 mL PnIj             Sincerely,        Electronically signed by Carol Chavez MD

## 2021-06-21 NOTE — LETTER
Letter by Carol Chavez MD at      Author: Carol Chavez MD Service: -- Author Type: --    Filed:  Encounter Date: 2021 Status: (Other)         Jeannine Rasheed  [address]  [facility] [address]      2021      Dear Jeannine Rasheed,   : 1960      This letter is in regards to the appointment that you had scheduled on 21 at the [facility] with Dr. Chavez.     The [facility] strives to see all patients in a timely manner and we need your help to achieve this.  The above-mentioned appointment was missed and we do not have record of a cancellation by you.  Whenever possible, we request appointment cancellations at least 72 hours in advance.  This time allows us to offer the appointment to another patient in need.      If you feel you have received this letter in error, or if you need to reschedule this appointment, please call our office so that we may update our records.      Sincerely,    [facility]

## 2021-06-22 NOTE — PROGRESS NOTES
Assessment/Plan:     1. Routine general medical examination at a health care facility  Routine history and physical, updated in EMR.  Nonfasting labs updated as below.  Pap smear updated today.  Patient to check with her insurance about coverage for the new shingles vaccine, immunizations otherwise up-to-date.  See below for problem specific plans.  Plan repeat physical in 1 year.    2. Type 2 diabetes mellitus without complication, without long-term current use of insulin (H)  A1c remains elevated slightly above goal.  Recommended patient follow-up in 1 month with some pre-meal sugars to review.  She did cut back use of her glipizide as she stated she was having some low sugars overnight.  She would likely benefit from a visit with diabetic education as well.  Labs updated as below.  - Glycosylated Hemoglobin A1c  - Basic Metabolic Panel  - Microalbumin, Random Urine    3. Familial hypercholesterolemia  Cholesterol within goal range on current dose of simvastatin.  - Lipid Cascade RANDOM    4. Hypothyroidism, unspecified type  TSH updated today.  Continues on levothyroxine.  - Thyroid Stimulating Hormone (TSH)    5. Screening for malignant neoplasm of cervix  Routine screening Pap smear updated today.  - Gynecologic Cytology (PAP Smear)  - HPV High Risk DNA Cervical    6. Postmenopausal bleeding  Ultrasound ordered to further evaluate patient's episode of postmenopausal bleeding.  Her uterine lining is thin and normal in appearance.  No further workup unless she has another episode of bleeding.  - US Pelvis With Transvaginal Non OB; Future      Subjective:      Jeannine Rasheed is a 58 y.o. female who presents for an annual exam. The patient is sexually active. The patient participates in regular exercise: yes. The patient reports that there is not domestic violence in her life.  Patient does not bring in any blood sugars to review today.  She says that her morning fasting sugars range from 110-180s low sugars  overnight, and so cut back her glipizide to 20 mg in the morning and 10 mg in the evening.  She mentions that in 2018, she did have a normal menses.  Prior to that, it had been greater than 1 year since her last menses.  She otherwise has been feeling well and has no other questions or concerns today.    Healthy Habits:   Regular Exercise: Yes  Sunscreen Use: Yes  Healthy Diet: Yes  Dental Visits Regularly: Yes  Seat Belt: Yes  Sexually active: Yes  Self Breast Exam Monthly:Yes  Colonoscopy: 13 - 10 years  Lipid Profile: Yes  Glucose Screen: No  Prevention of Osteoporosis: No      Immunization History   Administered Date(s) Administered     Influenza, inj, historic,unspecified 10/05/2015, 10/10/2017     Tdap 2012     Immunization status: missing doses of Shingrix, patient to check with her insurance.    No exam data present    Gynecologic History  No LMP recorded. Patient is perimenopausal.  Contraception: post menopausal status  Last Pap: 10/29/13. Results were: normal  Last mammogram: 16. Results were: normal      OB History    Para Term  AB Living   2 2 2         SAB TAB Ectopic Multiple Live Births                  # Outcome Date GA Lbr Kendrick/2nd Weight Sex Delivery Anes PTL Lv   2 Term            1 Term                   Current Outpatient Medications   Medication Sig Dispense Refill     aspirin 81 MG EC tablet Take 81 mg by mouth daily.       glipiZIDE (GLUCOTROL) 10 MG tablet TAKE 2 TABLETS BY MOUTH TWICE DAILY BEFORE MEALS 360 tablet 0     JANUVIA 100 mg tablet TAKE 1 TABLET BY MOUTH ONCE DAILY  90 tablet 0     levothyroxine (SYNTHROID, LEVOTHROID) 125 MCG tablet take 1 tablet by mouth once daily at 6:00 am. 90 tablet 0     metFORMIN (GLUCOPHAGE) 1000 MG tablet take 1 tablet by mouth twice daily with meals 180 tablet 3     ONETOUCH ULTRA TEST strips Use 1 each As Directed 3 (three) times a day. 100 strip 11     simvastatin (ZOCOR) 40 MG tablet TAKE ONE TABLET BY MOUTH AT  BEDTIME 90 tablet 0     TRULICITY 1.5 mg/0.5 mL PnIj INJECT 1.5 MG UNDER THE SKIN ONCE A WEEK. 2 mL 5     UNABLE TO FIND once daily. Med Name: Linh ActivVit vitamin       No current facility-administered medications for this visit.      No past medical history on file.  Past Surgical History:   Procedure Laterality Date     ANKLE FRACTURE SURGERY Right 2005     [place] CORRJ HALLUX VALGUS W/SESMDC W/2 OSTEOT      Description: Hallux Valgus (Bunion) Correction;  Recorded: 10/29/2013;  Comments: age 13     [place] REVISE MEDIAN N/CARPAL TUNNEL SURG      Description: Neuroplasty Decompression Median Nerve At Carpal Tunnel;  Recorded: 10/26/2010;     Patient has no known allergies.  Family History   Problem Relation Age of Onset     Cancer Mother         lymphoma     Heart disease Father         bypass at 55     Heart disease Brother      Cancer Maternal Grandmother         cervical     Heart disease Paternal Grandmother      Breast cancer Neg Hx      Colon cancer Neg Hx      Diabetes Neg Hx      Social History     Socioeconomic History     Marital status:      Spouse name: Not on file     Number of children: Not on file     Years of education: Not on file     Highest education level: Not on file   Social Needs     Financial resource strain: Not on file     Food insecurity - worry: Not on file     Food insecurity - inability: Not on file     Transportation needs - medical: Not on file     Transportation needs - non-medical: Not on file   Occupational History     Not on file   Tobacco Use     Smoking status: Never Smoker     Smokeless tobacco: Never Used   Substance and Sexual Activity     Alcohol use: Yes     Alcohol/week: 0.0 - 6.8 oz     Drug use: Not on file     Sexual activity: Yes     Partners: Male     Comment:  Isaias   Other Topics Concern     Not on file   Social History Narrative     Not on file       Review of Systems  General:  Denies problem  Eyes: Denies problem  Ears/Nose/Throat: Denies  "problem  Cardiovascular: Denies problem  Respiratory:  Denies problem  Gastrointestinal:  Denies problem  Genitourinary: Denies problem  Musculoskeletal:  Denies problem  Skin: Denies problem  Neurologic: Denies problem  Psychiatric: Denies problem  Endocrine: Denies problem  Heme/Lymphatic: Denies problem   Allergic/Immunologic: Denies problem        Objective:         Vitals:    12/05/18 1304   BP: 118/74   Pulse: 84   Resp: 20   Weight: 157 lb 14.4 oz (71.6 kg)   Height: 5' 0.8\" (1.544 m)     Body mass index is 30.03 kg/m .    Physical Exam:  General Appearance: Alert, cooperative, no distress, appears stated age  Head: Normocephalic, without obvious abnormality, atraumatic  Eyes: PERRL, conjunctiva/corneas clear, EOM's intact  Ears: Normal TM's and external ear canals, both ears  Nose: Nares normal, septum midline, mucosa normal, no drainage  Throat: Lips, mucosa, and tongue normal; teeth and gums normal  Neck: Supple, symmetrical, trachea midline, no adenopathy; thyroid: not enlarged, symmetric, no tenderness/mass/nodules; no carotid bruit or JVD  Back: Symmetric, no curvature, ROM normal, no CVA tenderness  Lungs: Clear to auscultation bilaterally, respirations unlabored  Breasts: No breast masses, tenderness, asymmetry, or nipple discharge  Heart: Regular rate and rhythm, S1 and S2 normal, no murmur, rub, or gallop  Abdomen: Soft, non-tender, bowel sounds active all four quadrants, no masses, no organomegaly  Pelvic: Normally developed genitalia with no external lesions or eruptions. Vagina and cervix show no lesions, inflammation, discharge or tenderness. No cystocele, No rectocele. Uterus difficult to palpate due to body habitus.  No adnexal mass or tenderness.  Extremities: Extremities normal, atraumatic, no cyanosis or edema  Skin: Skin color, texture, turgor normal, no rashes or lesions  Lymph nodes: Cervical, supraclavicular, and axillary nodes normal  Neurologic: Normal        "

## 2021-06-23 NOTE — TELEPHONE ENCOUNTER
Refill Approved    Rx renewed per Medication Renewal Policy. Medication was last renewed on 9/27/18.    Danielle Veloz, [facility] Triage/Med Refill 1/14/2019     Requested Prescriptions   Pending Prescriptions Disp Refills     simvastatin (ZOCOR) 40 MG tablet [Pharmacy Med Name: Simvastatin Oral Tablet 40 MG] 90 tablet 0     Sig: TAKE 1 TABLET BY MOUTH NIGHTLY AT BEDTIME    Statins Refill Protocol (Hmg CoA Reductase Inhibitors) Passed - 1/13/2019  3:27 PM       Passed - PCP or prescribing provider visit in past 12 months     Last office visit with prescriber/PCP: 10/18/2017 Carol Chavez MD OR same dept: Visit date not found OR same specialty: 10/18/2017 Carol Chavez MD  Last physical: 12/5/2018 Last MTM visit: Visit date not found   Next visit within 3 mo: Visit date not found  Next physical within 3 mo: Visit date not found  Prescriber OR PCP: Carol Chavez MD  Last diagnosis associated with med order: 1. Hyperlipidemia  - simvastatin (ZOCOR) 40 MG tablet [Pharmacy Med Name: Simvastatin Oral Tablet 40 MG]; TAKE 1 TABLET BY MOUTH NIGHTLY AT BEDTIME  Dispense: 90 tablet; Refill: 0    If protocol passes may refill for 12 months if within 3 months of last provider visit (or a total of 15 months).

## 2021-06-25 NOTE — PROGRESS NOTES
"    Assessment & Plan     Type 2 diabetes mellitus with hyperglycemia, with long-term current use of insulin (H)  Initially, based on blood sugar readings, recommended increasing Ozempic to 1 mg every 7 days.  A1c however returned within goal range, so recommended staying with 0.5 mg every 7 days with recheck in 3 months.  Patient will try to check more post-meal sugars for review next time.  - Glycosylated Hemoglobin A1c  - Microalbumin, Random Urine  - semaglutide (OZEMPIC) 0.25 mg or 0.5 mg(2 mg/1.5 mL) PnIj; Inject 0.5 mg under the skin every 7 days.    Review of the result(s) of each unique test - labs as below  Ordering of each unique test  Prescription drug management         BMI:   Estimated body mass index is 27.6 kg/m  as calculated from the following:    Height as of this encounter: 5' 0.8\" (1.544 m).    Weight as of this encounter: 145 lb 2 oz (65.8 kg).     Return in about 3 months (around 9/3/2021) for Recheck diabetes.    Carol Chavez MD  [facility]    Subjective   Jeannine Rasheed is 60 y.o. and presents today for the following health issues   HPI     Patient presents today for follow-up of type II diabetes mellitus.  Since changing from Trulicity, patient denies any further episodes of nausea.  Over the past 2 days, she has developed a raised papular rash on her hands and lower legs, but was working in the garden prior to noticing this.  She took a Benadryl once.  The rash is a bit itchy.  She brings in blood sugar readings today, and morning fasting sugars since starting Ozempic range from the 90's to 243, with about half of readings above goal range.  She only had 2 readings in the 200's, and could identify what she had eaten the night before to cause these high readings.  Her post-meal sugars are usually above goal, ranging from 147 to 351 with an average in the lower 200's.  She has been feeling well, denies any side effects from her medicine or symptomatic " "low blood sugars.    Labs Reviewed:  Lab Results   Component Value Date    WBC 6.3 04/16/2020    HGB 13.6 04/16/2020    HCT 41.2 04/16/2020     04/16/2020    CHOL 156 10/05/2020    TRIG 143 10/05/2020    HDL 49 (L) 10/05/2020    LDLDIRECT 105 09/20/2011    ALT 78 (H) 01/18/2017    AST 42 (H) 01/18/2017     10/05/2020    K 4.6 10/05/2020     10/05/2020    CREATININE 0.87 10/05/2020    BUN 16 10/05/2020    CO2 25 10/05/2020    TSH 1.00 02/11/2021    HGBA1C 8.3 (H) 02/11/2021    MICROALBUR 0.77 04/16/2020       Review of Systems  Negative except as noted above      Objective    /62 (Patient Site: Left Arm, Patient Position: Sitting, Cuff Size: Adult Regular)   Pulse 74   Ht 5' 0.8\" (1.544 m)   Wt 145 lb 2 oz (65.8 kg)   BMI 27.60 kg/m    Body mass index is 27.6 kg/m .  Physical Exam  General: well-developed, well nourished female in no acute distress  Cardiovascular: regular rate and rhythm, no murmur auscultated  Respiratory: clear to auscultation bilaterally  Neurologic: alert and oriented, good historian    Results for orders placed or performed in visit on 06/03/21   Glycosylated Hemoglobin A1c   Result Value Ref Range    Hemoglobin A1c 6.9 (H) <=5.6 %   Microalbumin, Random Urine   Result Value Ref Range    Microalbumin, Random Urine 0.78 0.00 - 1.99 mg/dL    Creatinine, Urine 100.9 mg/dL    Microalbumin/Creatinine Ratio Random Urine 7.7 <=19.9 mg/g               "

## 2021-06-26 NOTE — PATIENT INSTRUCTIONS - HE
Go ahead and check on your pneumonia vaccine status from [facility].  Let us know what you find out.

## 2021-06-27 ENCOUNTER — HEALTH MAINTENANCE LETTER (OUTPATIENT)
Age: 61
End: 2021-06-27

## 2021-06-30 NOTE — PROGRESS NOTES
Progress Notes by Norah Lepe RN at 2021 11:30 AM     Author: Norah Lepe RN Service: -- Author Type: Registered Nurse    Filed: 2021 11:45 AM Encounter Date: 2021 Status: Attested    : Norah Lepe RN (Registered Nurse) Cosigner: Carol Chavez MD at 2021  2:21 PM    Attestation signed by Carol Chavez MD at 2021  2:21 PM    Reviewed note and agree with plan of care.                Assessment: Jeannine Rasheed is a  60 y.o.who presents today for education and instruction on use of Lantus solostar insulin pen with A1c presently not at ADA goal of <7.0 Yesenia arrived alone and brought both her most recent blood sugar readings as well as new insulin pen with her.   Education and instruction were provided to her today on the following - difference between basal and bolus insulins, MOA of basal insulin and expected effects on managing BG, storage and expiration, needle placement , priming, injection technique, site rotation and safe needle disposal. this was demonstrated to her using a mock pen from clinic and she was able to perform a successful return demonstration.   Based upon her current A1c, weight and BG that was reviewed Yesenia was instructed to begin with a dose of 12 units SC daily. She will increase this dose by 2 units every 2-3 days until morning blood glucose is <130 - She will then remain at this dose  Also have her restart Jardiance    Current diabetes medications: Metformin 1000 mg PO BID, Trulicity 3 mg SC weekly  BG Summary:  FB, 301, 309, 250, 266, 262, 291  Pre lunch : 358  Post lunch: 418, 319, 446  Pre dinner: 347  Post dinner: 305, 359, 349, 349, 245, 245      Nutrition: Reviewed with her importance of eating balance meals at regular intervals during the day while awake. Monitor CHO intake for meals and snacks and be mindful of portions      Activity: Encouraged goal of 30 minutes moderate activity/exercise each day    Plan: Check  blood sugars at least twice each day - am fasting and again 2 hours AFTER dinner , Yesenia was reminded to bring meter and log book to all follow up appointments for review. Eat three balanced meals each day following the parameters for intake for all meals and snacks from ADA guidelines. Keep active - goal being 30 minutes daily of moderate activity. Medications: Continue Metformin, Trulicity as prescribed - restart Jardiance and will begin basal insulin /Lantus at 12 units SC QD.   Follow up has been scheduled for 1 week via My Chart - she has agreed to send BG numbers to myself or Dr Chavez for review . She was instructed to call with any questions/concerns that may come up before then.      Subjective and Objective:      Jeannine Rasheed has been referred by Dr Chavez for Diabetes Education.     Lab Results   Component Value Date    HGBA1C 8.3 (H) 02/11/2021         Wt 154 lb 6.4 oz (70 kg)   BMI 29.37 kg/m      Goals        General    ? Medications      Take diabetes medications as prescribed   11/27/2019          ? Monitor       Check blood sugars 2  times each day until therapeutic goals are met  remember to bring meter and log book to all appointments   11/27/2019             Other    ? Nutrition       Eat 3 balanced meals each day - Monitor carb intake and limit to 3-4 choices (45-60 grams) per meal    Do not wait longer than 4-5 hours to eat something  SOMETIMES MET  1/25/2017  MOSTLY MET  10/23/2017  SOMETIMES MET 11/27/2019                    Follow up:   Primary care visit  CDE (certified diabetic educator)      Education:     Monitoring   Meter (per above goals): Assessed and Discussed  Monitoring: Assessed, Discussed and Competent  BG goals: Assessed, Discussed and Literature provided      Medications: Assessed, Discussed and Literature provided  Orals: Assessed and Discussed  Injected Medications: Assessed, Discussed, Literature provided and Patient returned demonstration   Storage/Exp:Assessed,  Discussed and Literature provided   Site Rotation: Assessed, Discussed and Literature provided   Sites Assessed: no    Diabetes Disease Process: Assessed and Discussed    Acute Complications: Prevent, Detect, Treat:  Hypoglycemia: Discussed  Hyperglycemia: Assessed, Discussed and Needs instruction/review at follow-up    Goal Setting and Problem Solving: Assessed and Discussed  Barriers: Assessed and Discussed  Psychosocial Adjustments: Assessed and Discussed      Time spent with the patient: 30 minutes for diabetes education and counseling.   Previous Education: yes  Visit Type:DSMT      Norah Lepe RN CDCES  Diabetes Education  2/24/2021  Much or all of the text in this note was generated through the use of the Dragon Dictate voice-to-text software. Errors in spelling or words which seem out of context are unintentional. Sound alike errors, in particular, may have escaped editing  Medication adjustments were made today based upon / using [facility] CDE Policy and Protocol with patients primary care provider.

## 2021-07-03 NOTE — ADDENDUM NOTE
Addendum Note by Carol Chavez MD at 11/22/2019  8:15 AM     Author: Carol Chavez MD Service: -- Author Type: Physician    Filed: 11/22/2019  4:30 PM Encounter Date: 11/22/2019 Status: Signed    : Carol Chavez MD (Physician)    Addended by: CAROL CHAVEZ on: 11/22/2019 04:30 PM        Modules accepted: Orders

## 2021-07-03 NOTE — ADDENDUM NOTE
Addendum Note by Luis Felipe Lepe RN at 1/25/2017 10:56 AM     Author: Luis Felipe Lepe RN Service: -- Author Type: Registered Nurse    Filed: 1/25/2017 10:56 AM Encounter Date: 1/25/2017 Status: Signed    : Luis Felipe Lepe RN (Registered Nurse)    Addended by: LUIS FELIPE LEPE on: 1/25/2017 10:56 AM        Modules accepted: Orders

## 2021-07-06 VITALS
HEIGHT: 61 IN | BODY MASS INDEX: 27.4 KG/M2 | SYSTOLIC BLOOD PRESSURE: 118 MMHG | DIASTOLIC BLOOD PRESSURE: 62 MMHG | HEART RATE: 74 BPM | WEIGHT: 145.13 LBS

## 2021-08-31 NOTE — PROGRESS NOTES
Assessment: Jeannine Rasheed is a  59 y.o.who presents today for a consult and review of blood sugars with A1c presently not at ADA goal of <7.0 Yesenia arrived Accompanied by her daughter Antionette and unfortunately she did not bring her meter with her, but did have her logbook. Per report, She had been trying to check her blood sugars twice daily as requested by Dr. Chavez- states she is taking all diabetes medications as prescribed with no missed or skipped doses.  She was last seen by CDE in 2017    Current diabetes medications: Metformin 1000 mg p.o. twice daily, glipizide 10 mg p.o. twice daily, Trulicity 1.5 mg SC weekly taking on Sundays  Reviewed with her today the mechanism of action of each of the current medication she is taking and the expectations for them to help reduce blood sugars.  BG Summary: Yesenia had a total of 9 glucose readings in her logbook today and they are as follows:  FB, 289, 197, 181, 323 (this last reading was after a hot fudge  the night before)  Pre dinner: 208, 208, 196  These numbers were reviewed and discussed with Yesenia during our appointment today.  Informed her that there are 2 possible treatment options that we have that would be appropriate choices for  her- basal insulin (suggested Tresiba) which will help lower her fasting/pre-meal numbers or an SGLT2 such as Jardiance which will act more on her post prandial numbers.  Provided her education of the mechanism of action of both of these medications and expected effects on blood sugars and reduction of A1c.  We also talked about possible side effects and contraindications.  She then conferred with her daughter and asked to check with her formulary for coverage.    Nutrition: Currently eating 3 meals each day  -viewed typical intake:  B: Oatmeal with sliced almonds or walnuts or cottage cheese with peaches or scrambled eggs with 2 slices of toast  L: Often is a salad with chicken and vegetables will sometimes also have  Subjective


Date Seen by Provider:  Aug 31, 2021


Time Seen by Provider:  08:00


Subjective/Events-last exam


Patient was seen at bedside, she was feeling better.  No new complaint.  No 

chest pain or shortness of breath.


Review of Systems


General:  No Chills, No Night Sweats; Fatigue, Malaise; No Appetite, No Other


HEENT:  No Head Aches, No Visual Changes, No Eye Pain, No Ear Pain, No 

Dysphasia, No Sinus Congestion, No Post Nasal Drip, No Sore Throat, No Other


Pulmonary:  No Dyspnea, No Cough, No Pleuritic Chest Pain, No Other


Cardiovascular:  No: Chest Pain, Palpitations, Orthopnea, Paroxysmal Noc. 

Dyspnea, Edema, Lt Headedness, Other





Objective-Cardiology


Exam


Last Set of Vital Signs





Vital Signs








 8/31/21





 11:40


 


Temp 36.3


 


Pulse 69


 


Resp 18


 


B/P (MAP) 145/60


 


Pulse Ox 98


 


O2 Delivery Room Air








I&O











Intake and Output 


 


 8/31/21





 00:00


 


Intake Total 110 ml


 


Balance 110 ml


 


 


 


Intake Oral 100 ml


 


IV Total 10 ml


 


# Voids 1


 


Daily Weight Change No








General:  Alert, Oriented X3, Cooperative


HEENT:  Atraumatic, PERRLA


Neck:  Supple, No JVD, No Thyromegaly


Lungs:  Clear to Auscultation, Normal Air Movement


Heart:  Regular Rate, Normal S1, Normal S2, No Murmurs


Abdomen:  Normal Bowel Sounds, Soft, No Tenderness, No Hepatosplenomegaly, No 

Masses


Extremities:  No Clubbing, No Cyanosis, No Edema, Normal Pulses, No 

Tenderness/Swelling


Skin:  No Rashes, No Breakdown, No Significant Lesion


Neuro:  Normal Gait, Normal Speech, Normal Tone, Sensation Intact


Psych/Mental Status:  Mental Status NL, Mood NL





Results


Lab


Laboratory Tests


8/31/21 06:08














A/P-Cardiology


Admission Diagnosis


TIA


Coronary artery disease


Hypertension


Hyperlipidemia





Assessment/Plan


TIA, had dysarthria and numbness in her left side, returned to baseline and 

resolved, had history of CVA with some residual right hemiparesis.  Still had on

her baseline slight slurred speech and muscle strength is about 2-3 / 5 on the 

right side and 4/5 on the left side.  She has been maintained on aspirin and 

Plavix which I recommend continuing.





Coronary artery disease, history of CABG x3 done in 2020 at [facility], clinically stable at this time.  Continue to monitor





Hypertension, restart home medication monitor blood pressure





Hyperlipidemia, monitor lipids





History of peripheral arterial disease, had ischemic event after her bypass that

improved with conservative management.





Patient reported full resolution of her symptoms, back to her baseline.  

Recommend conservative management.











BROOKE SHEA MD              Aug 31, 2021 12:54 fruit such as a banana or half of an apple  D: Protein of some type most likely will have a vegetable sometimes a starch but not always- drinks 1-1/2 cups of milk with dinner      Activity: States she has a treadmill at work which she uses 30 minutes each day.    Plan: Check blood sugars twice each day -requested that Yesenia get some both fasting as well as postprandial readings for more variance of data, Yesenia was reminded to bring meter and log book to all follow up appointments for review. Eat three balanced meals each day following the parameters for intake for all meals and snacks from ADA guidelines. Keep active - goal being 30 minutes daily of moderate activity. Medications: Continue metformin, glipizide, and Trulicity as currently as prescribed we will reassess after CGM study.   Follow up is pending. She was instructed to call with any questions/concerns that may come up before then.    Feel that Yesenia would benefit from a 14 day dorothy pro diagnostic CGM to help better evaluate her glucose trends and get a better idea of which adjunct therapy benefit her the most -had intended on placing one on her today but discovered the sensors in clinic all had  in September.  She has agreed to meet me on  at [facility] for placement there    Subjective and Objective:      Jeannine Rasheed has been referred by Dr. Chavez for Diabetes Education.     Lab Results   Component Value Date    HGBA1C 9.2 (H) 2019         Wt 154 lb 6.4 oz (70 kg)   BMI 29.37 kg/m      Goals        General      Medications      Take diabetes medications as prescribed   2019            Monitor       Check blood sugars 2  times each day until therapeutic goals are met  remember to bring meter and log book to all appointments   2019             Other      Nutrition       Eat 3 balanced meals each day - Monitor carb intake and limit to 3-4 choices (45-60 grams) per meal    Do not wait longer than 4-5 hours to  eat something  SOMETIMES MET  1/25/2017  MOSTLY MET  10/23/2017  SOMETIMES MET 11/27/2019                    Follow up:   Primary care visit  CDE (certified diabetic educator)      Education:     Monitoring   Meter (per above goals): Assessed and Discussed  Monitoring: Assessed, Discussed, Literature provided and Needs instruction/review at follow-up  BG goals: Assessed, Discussed and Literature provided    Nutrition Management  Nutrition Management: Assessed and Discussed  Weight: Assessed and Discussed  Portions/Balance: Discussed  Carb ID/Count: Assessed and Discussed  Label Reading: Discussed  Heart Healthy Fats: Discussed  Menu Planning: Assessed, Discussed and Needs instruction/review at follow-up  Dining Out: Discussed  Physical Activity: Assessed and Discussed  Medications: Assessed and Discussed  Orals: Assessed and Discussed  Injected Medications: Discussed   Storage/Exp:Not addressed   Site Rotation: Assessed and Discussed   Sites Assessed: no    Diabetes Disease Process: Assessed and Discussed    Acute Complications: Prevent, Detect, Treat:  Hypoglycemia: Assessed and Discussed  Hyperglycemia: Assessed, Discussed and Needs instruction/review at follow-up  Sick Days: Not addressed  Driving: Not addressed      Goal Setting and Problem Solving: Assessed and Discussed  Barriers: Assessed and Discussed  Psychosocial Adjustments: Assessed and Discussed      Time spent with the patient: 60 minutes for diabetes education and counseling.   Previous Education: yes  Visit Type:DSMT         Norah Lepe RN CDE  Diabetes Education  11/27/2019    Much or all of the text in this note was generated through the use of the Dragon Dictate voice-to-text software. Errors in spelling or words which seem out of context are unintentional. Sound alike errors, in particular, may have escaped editing.

## 2021-09-24 ENCOUNTER — TRANSFERRED RECORDS (OUTPATIENT)
Dept: HEALTH INFORMATION MANAGEMENT | Facility: CLINIC | Age: 61
End: 2021-09-24

## 2021-10-14 NOTE — TELEPHONE ENCOUNTER
Appointment rescheduled  
Left message to call back for: Yesenia  Information to relay to patient:  LMTCB. Calling to see if pt is willing to switch telephone visit to either this afternoon or anytime tomorrow.     
negative...

## 2021-11-14 DIAGNOSIS — E11.9 TYPE 2 DIABETES MELLITUS WITHOUT COMPLICATION, WITHOUT LONG-TERM CURRENT USE OF INSULIN (H): ICD-10-CM

## 2021-11-15 DIAGNOSIS — E78.5 HYPERLIPIDEMIA: ICD-10-CM

## 2021-11-16 NOTE — TELEPHONE ENCOUNTER
"Routing refill request to provider for review/approval because:  Labs not current:  CR    Last Written Prescription Date:  11/20/20  Last Fill Quantity: 180,  # refills: 3   Last office visit provider:  6/3/21     Requested Prescriptions   Pending Prescriptions Disp Refills     metFORMIN (GLUCOPHAGE) 1000 MG tablet [Pharmacy Med Name: METFORMIN HCL TABS 1000MG] 180 tablet 3     Sig: TAKE 1 TABLET TWICE A DAY WITH MEALS       Biguanide Agents Failed - 11/14/2021 11:11 PM        Failed - Patient's CR is NOT>1.4 OR Patient's EGFR is NOT<45 within past 12 mos.     Recent Labs   Lab Test 10/05/20  0800   GFRESTIMATED >60   GFRESTBLACK >60       Recent Labs   Lab Test 10/05/20  0800   CR 0.87             Passed - Patient is age 10 or older        Passed - Patient has documented A1c within the specified period of time.     If HgbA1C is 8 or greater, it needs to be on file within the past 3 months.  If less than 8, must be on file within the past 6 months.     Recent Labs   Lab Test 06/03/21  0745   A1C 6.9*             Passed - Patient does NOT have a diagnosis of CHF.        Passed - Medication is active on med list        Passed - Patient is not pregnant        Passed - Patient has not had a positive pregnancy test within the past 12 mos.         Passed - Recent (6 mo) or future (30 days) visit within the authorizing provider's specialty     Patient had office visit in the last 6 months or has a visit in the next 30 days with authorizing provider or within the authorizing provider's specialty.  See \"Patient Info\" tab in inbasket, or \"Choose Columns\" in Meds & Orders section of the refill encounter.                 Brooks David RN 11/16/21 11:08 AM  "

## 2021-11-17 NOTE — TELEPHONE ENCOUNTER
"Routing refill request to provider for review/approval because:  Labs not current:  LDL    Last Written Prescription Date:  12/14/20  Last Fill Quantity: 90,  # refills: 3   Last office visit provider:  6/3/21     Requested Prescriptions   Pending Prescriptions Disp Refills     simvastatin (ZOCOR) 40 MG tablet [Pharmacy Med Name: SIMVASTATIN TABS 40MG] 90 tablet 3     Sig: TAKE 1 TABLET AT BEDTIME       Statins Protocol Failed - 11/15/2021 11:23 PM        Failed - LDL on file in past 12 months     Recent Labs   Lab Test 10/05/20  0800   LDL 78             Passed - No abnormal creatine kinase in past 12 months     No lab results found.             Passed - Recent (12 mo) or future (30 days) visit within the authorizing provider's specialty     Patient has had an office visit with the authorizing provider or a provider within the authorizing providers department within the previous 12 mos or has a future within next 30 days. See \"Patient Info\" tab in inbasket, or \"Choose Columns\" in Meds & Orders section of the refill encounter.              Passed - Medication is active on med list        Passed - Patient is age 18 or older        Passed - No active pregnancy on record        Passed - No positive pregnancy test in past 12 months             Brooks David RN 11/17/21 2:35 PM  "

## 2021-11-18 RX ORDER — SIMVASTATIN 40 MG
TABLET ORAL
Qty: 90 TABLET | Refills: 3 | Status: SHIPPED | OUTPATIENT
Start: 2021-11-18 | End: 2022-11-14

## 2021-11-23 ENCOUNTER — OFFICE VISIT (OUTPATIENT)
Dept: FAMILY MEDICINE | Facility: CLINIC | Age: 61
End: 2021-11-23
Payer: COMMERCIAL

## 2021-11-23 VITALS
HEIGHT: 61 IN | HEART RATE: 72 BPM | SYSTOLIC BLOOD PRESSURE: 129 MMHG | BODY MASS INDEX: 27.8 KG/M2 | WEIGHT: 147.25 LBS | DIASTOLIC BLOOD PRESSURE: 60 MMHG

## 2021-11-23 DIAGNOSIS — Z11.59 ENCOUNTER FOR HEPATITIS C SCREENING TEST FOR LOW RISK PATIENT: ICD-10-CM

## 2021-11-23 DIAGNOSIS — Z11.4 SCREENING FOR HUMAN IMMUNODEFICIENCY VIRUS WITHOUT PRESENCE OF RISK FACTORS: ICD-10-CM

## 2021-11-23 DIAGNOSIS — E78.01 FAMILIAL HYPERCHOLESTEROLEMIA: ICD-10-CM

## 2021-11-23 DIAGNOSIS — Z79.4 TYPE 2 DIABETES MELLITUS WITH HYPERGLYCEMIA, WITH LONG-TERM CURRENT USE OF INSULIN (H): ICD-10-CM

## 2021-11-23 DIAGNOSIS — E03.9 HYPOTHYROIDISM, UNSPECIFIED TYPE: ICD-10-CM

## 2021-11-23 DIAGNOSIS — D64.9 ANEMIA, UNSPECIFIED TYPE: ICD-10-CM

## 2021-11-23 DIAGNOSIS — E11.65 TYPE 2 DIABETES MELLITUS WITH HYPERGLYCEMIA, WITH LONG-TERM CURRENT USE OF INSULIN (H): ICD-10-CM

## 2021-11-23 DIAGNOSIS — E78.2 MIXED HYPERLIPIDEMIA: ICD-10-CM

## 2021-11-23 DIAGNOSIS — Z00.00 ROUTINE GENERAL MEDICAL EXAMINATION AT A HEALTH CARE FACILITY: Primary | ICD-10-CM

## 2021-11-23 PROCEDURE — 90732 PPSV23 VACC 2 YRS+ SUBQ/IM: CPT | Performed by: FAMILY MEDICINE

## 2021-11-23 PROCEDURE — 90471 IMMUNIZATION ADMIN: CPT | Performed by: FAMILY MEDICINE

## 2021-11-23 PROCEDURE — 99396 PREV VISIT EST AGE 40-64: CPT | Mod: 25 | Performed by: FAMILY MEDICINE

## 2021-11-23 ASSESSMENT — MIFFLIN-ST. JEOR: SCORE: 1172.12

## 2021-11-23 NOTE — PROGRESS NOTES
SUBJECTIVE:   CC: Jeannine Rasheed is an 61 year old woman who presents for preventive health visit.     Patient has been advised of split billing requirements and indicates understanding: Yes  Healthy Habits:    Getting at least 3 servings of Calcium per day:  Yes    Bi-annual eye exam:  Yes    Dental care twice a year:  Yes    Sleep apnea or symptoms of sleep apnea:  None    Diet:  Regular (no restrictions)    Frequency of exercise:  4-5 days/week    Duration of exercise:  30-45 minutes    Taking medications regularly:  Yes    Medication side effects:  Not applicable    PHQ-2 Total Score:    Additional concerns today:  No    Problems to Add:  Brings in blood sugar readings from home.  Morning fasting sugars have ranged from 89 to 142 with an average in the 120's.  Postmeal sugars range from 124 to 205 and are very variable, averaging probably 160.  She has been taking her medications for diabetes (Jardiance, 16 units of lantus, metformin and Ozempic) as prescribed.      Today's PHQ-2 Score:   PHQ-2 ( 1999 Pfizer) 11/23/2021   Q1: Little interest or pleasure in doing things 0   Q2: Feeling down, depressed or hopeless 0   PHQ-2 Score 0   Q1: Little interest or pleasure in doing things -   Q2: Feeling down, depressed or hopeless -   PHQ-2 Score -       Abuse: Current or Past (Physical, Sexual or Emotional) - No  Do you feel safe in your environment? Yes    Have you ever done Advance Care Planning? (For example, a Health Directive, POLST, or a discussion with a medical provider or your loved ones about your wishes): No, advance care planning information given to patient to review.  Patient plans to discuss their wishes with loved ones or provider.      Social History     Tobacco Use     Smoking status: Never Smoker     Smokeless tobacco: Never Used   Substance Use Topics     Alcohol use: Yes     Alcohol/week: 0.0 - 6.0 standard drinks     Comment: Alcoholic Drinks/day: rare         Alcohol Use 11/22/2021    Prescreen: >3 drinks/day or >7 drinks/week? No       Reviewed orders with patient.  Reviewed health maintenance and updated orders accordingly - Yes    BP Readings from Last 3 Encounters:   11/23/21 129/60   06/03/21 118/62   11/22/19 124/68    Wt Readings from Last 3 Encounters:   11/23/21 66.8 kg (147 lb 4 oz)   06/03/21 65.8 kg (145 lb 2 oz)   02/24/21 70 kg (154 lb 6.4 oz)                  Patient Active Problem List   Diagnosis     Hypothyroidism     Familial hypercholesterolemia     Anemia     Type 2 diabetes mellitus with hyperglycemia, with long-term current use of insulin (H)     Plantar fasciitis     Trigger finger, acquired     Past Surgical History:   Procedure Laterality Date     ANKLE FRACTURE SURGERY Right 2005     COLONOSCOPY  2013     HC CORRECT BUNION,DOUBLE OSTEOTOMY      Description: Hallux Valgus (Bunion) Correction;  Recorded: 10/29/2013;  Comments: age 13     HC REVISE MEDIAN N/CARPAL TUNNEL SURG      Description: Neuroplasty Decompression Median Nerve At Carpal Tunnel;  Recorded: 10/26/2010;     SOFT TISSUE SURGERY  trigger finger and carpal tunnel       Social History     Tobacco Use     Smoking status: Never Smoker     Smokeless tobacco: Never Used   Substance Use Topics     Alcohol use: Yes     Alcohol/week: 0.0 - 6.0 standard drinks     Comment: rare     Family History   Problem Relation Age of Onset     Cancer Mother         lymphoma     Cerebrovascular Disease Mother 69.00     Hyperlipidemia Mother      Osteoporosis Mother      Heart Disease Father         bypass at 55     Hyperlipidemia Father      Heart Disease Brother         CAD s/p stents     Diabetes Brother      Coronary Artery Disease Brother      Cancer Maternal Grandmother         cervical     Heart Disease Paternal Grandmother      Breast Cancer No family hx of      Colon Cancer No family hx of      Diabetes No family hx of          Current Outpatient Medications   Medication Sig Dispense Refill     COQ10, UBIQUINOL, ORAL  "[COQ10, UBIQUINOL, ORAL] Take by mouth.       empagliflozin (JARDIANCE) 25 mg Tab tablet [EMPAGLIFLOZIN (JARDIANCE) 25 MG TAB TABLET] Take 1 tablet (25 mg total) by mouth daily. 90 tablet 3     insulin glargine (LANTUS SOLOSTAR U-100 INSULIN) 100 unit/mL (3 mL) pen [INSULIN GLARGINE (LANTUS SOLOSTAR U-100 INSULIN) 100 UNIT/ML (3 ML) PEN] Inject 16 Units under the skin daily. Take 16 units daily + use 2 unit prime with each dose for a total of 18 units/day 15 mL 1     levothyroxine (SYNTHROID, LEVOTHROID) 112 MCG tablet [LEVOTHYROXINE (SYNTHROID, LEVOTHROID) 112 MCG TABLET] TAKE 1 TABLET DAILY AT 6:00 A.M. 90 tablet 3     metFORMIN (GLUCOPHAGE) 1000 MG tablet TAKE 1 TABLET TWICE A DAY WITH MEALS 180 tablet 3     ONETOUCH ULTRA BLUE TEST STRIP strips [ONETOUCH ULTRA BLUE TEST STRIP STRIPS] USE 1 STRIP AS DIRECTED THREE TIMES A  strip 3     pen needle, diabetic (BD ULTRA-FINE GEORGIE PEN NEEDLE) 32 gauge x 5/32\" Ndle [PEN NEEDLE, DIABETIC (BD ULTRA-FINE GEORGIE PEN NEEDLE) 32 GAUGE X 5/32\" NDLE] Use 1 Device As Directed daily. 100 each 6     semaglutide (OZEMPIC) 0.25 mg or 0.5 mg(2 mg/1.5 mL) PnIj [SEMAGLUTIDE (OZEMPIC) 0.25 MG OR 0.5 MG(2 MG/1.5 ML) PNIJ] Inject 0.5 mg under the skin every 7 days. 3 Syringe 3     simvastatin (ZOCOR) 40 MG tablet TAKE 1 TABLET AT BEDTIME 90 tablet 3     No Known Allergies    Breast Cancer Screening:    Breast CA Risk Assessment (FHS-7) 11/22/2021   Do you have a family history of breast, colon, or ovarian cancer? No / Unknown       Mammogram Screening: Recommended mammography every 1-2 years with patient discussion and risk factor consideration  Pertinent mammograms are reviewed under the imaging tab.    History of abnormal Pap smear: NO - age 30-65 PAP every 5 years with negative HPV co-testing recommended  PAP / HPV Latest Ref Rng & Units 12/5/2018   PAP Negative for squamous intraepithelial lesion or malignancy. Negative for squamous intraepithelial lesion or " "malignancy  Electronically signed by Ana Singh CT (ASCP) on 12/13/2018 at  3:16 PM     HPV16 NEG Negative   HPV18 NEG Negative   HRHPV NEG Negative     Reviewed and updated as needed this visit by clinical staff  Tobacco  Allergies  Meds             Reviewed and updated as needed this visit by Provider                   Review of Systems  CONSTITUTIONAL: NEGATIVE for fever, chills, change in weight  INTEGUMENTARY/SKIN: NEGATIVE for worrisome rashes, moles or lesions  EYES: NEGATIVE for vision changes or irritation  ENT: NEGATIVE for ear, mouth and throat problems  RESP: NEGATIVE for significant cough or SOB  BREAST: NEGATIVE for masses, tenderness or discharge  CV: NEGATIVE for chest pain, palpitations or peripheral edema  GI: NEGATIVE for nausea, abdominal pain, heartburn, or change in bowel habits  : NEGATIVE for unusual urinary or vaginal symptoms. No vaginal bleeding.  MUSCULOSKELETAL: NEGATIVE for significant arthralgias or myalgia  NEURO: NEGATIVE for weakness, dizziness or paresthesias  PSYCHIATRIC: NEGATIVE for changes in mood or affect      OBJECTIVE:   /60 (BP Location: Left arm, Patient Position: Sitting, Cuff Size: Adult Regular)   Pulse 72   Ht 1.544 m (5' 0.8\")   Wt 66.8 kg (147 lb 4 oz)   Breastfeeding No   BMI 28.01 kg/m    Physical Exam  GENERAL APPEARANCE: healthy, alert and no distress  EYES: Eyes grossly normal to inspection, PERRL and conjunctivae and sclerae normal  HENT: ear canals and TM's normal, nose and mouth without ulcers or lesions, oropharynx clear and oral mucous membranes moist  NECK: no adenopathy, no asymmetry, masses, or scars and thyroid normal to palpation  RESP: lungs clear to auscultation - no rales, rhonchi or wheezes  BREAST: normal without masses, tenderness or nipple discharge and no palpable axillary masses or adenopathy  CV: regular rate and rhythm, normal S1 S2, no S3 or S4, no murmur, click or rub, no peripheral edema and peripheral pulses " strong  ABDOMEN: soft, nontender, no hepatosplenomegaly, no masses and bowel sounds normal  MS: no musculoskeletal defects are noted and gait is age appropriate without ataxia  SKIN: no suspicious lesions or rashes  NEURO: Normal strength and tone, sensory exam grossly normal, mentation intact and speech normal  PSYCH: mentation appears normal and affect normal/bright    Diagnostic Test Results:  Labs reviewed in Epic  Pending at time of note.    ASSESSMENT/PLAN:   1. Routine general medical examination at a health care facility  Routine history and physical, updated in EMR.  Patient will return fasting for labs as below.  Immunizations updated today.  Colon cancer screening is up to date, due in 2023.  Mammogram is scheduled for 12/23/21.  Pap smear is up to date, due in 2023.  Plan repeat physical in 1 year.  - Comprehensive metabolic panel (BMP + Alb, Alk Phos, ALT, AST, Total. Bili, TP); Future  - Lipid panel reflex to direct LDL Fasting; Future    2. Type 2 diabetes mellitus with hyperglycemia, with long-term current use of insulin (H)  Labs will be updated as below.  Last A1c was within goal range, and most blood sugars today appear within goal as well.  Plan recheck likely in 6 months.  - Comprehensive metabolic panel (BMP + Alb, Alk Phos, ALT, AST, Total. Bili, TP); Future  - Hemoglobin A1c; Future  - TSH; Future  - Lipid panel reflex to direct LDL Fasting; Future    3. Hypothyroidism, unspecified type  Continues on levothyroxine.  TSH will be updated with upcoming labs.  - TSH; Future    4. Mixed hypercholesterolemia  Continues on simvastatin and tolerates this well.  Labs will be updated as below.  - Comprehensive metabolic panel (BMP + Alb, Alk Phos, ALT, AST, Total. Bili, TP); Future  - Lipid panel reflex to direct LDL Fasting; Future    5. Anemia, unspecified type  Will recheck blood counts with upcoming labs.    6. Screening for human immunodeficiency virus without presence of risk factors  HIV  "screening added to upcoming labs, low risk patient.  - HIV Antigen Antibody Combo; Future    7. Encounter for hepatitis C screening test for low risk patient  Hep C screening added to upcoming labs, screening in low risk patient.  - Hepatitis C antibody; Future      Patient has been advised of split billing requirements and indicates understanding: Yes  COUNSELING:  Reviewed preventive health counseling, as reflected in patient instructions  Special attention given to:        Regular exercise       Healthy diet/nutrition       Immunizations    Vaccinated for: Pneumococcal         Consider Hep C screening for all patients one time for ages 18-79 years       HIV screeninx in teen years, 1x in adult years, and at intervals if high risk    Estimated body mass index is 28.01 kg/m  as calculated from the following:    Height as of this encounter: 1.544 m (5' 0.8\").    Weight as of this encounter: 66.8 kg (147 lb 4 oz).    Weight management plan: Discussed healthy diet and exercise guidelines    She reports that she has never smoked. She has never used smokeless tobacco.          Carol Chavez MD  [facility] [place]  "

## 2021-12-01 ENCOUNTER — LAB (OUTPATIENT)
Dept: LAB | Facility: CLINIC | Age: 61
End: 2021-12-01
Payer: COMMERCIAL

## 2021-12-01 DIAGNOSIS — Z00.00 ROUTINE GENERAL MEDICAL EXAMINATION AT A HEALTH CARE FACILITY: ICD-10-CM

## 2021-12-01 DIAGNOSIS — Z11.4 SCREENING FOR HUMAN IMMUNODEFICIENCY VIRUS WITHOUT PRESENCE OF RISK FACTORS: ICD-10-CM

## 2021-12-01 DIAGNOSIS — E78.01 FAMILIAL HYPERCHOLESTEROLEMIA: ICD-10-CM

## 2021-12-01 DIAGNOSIS — E11.65 TYPE 2 DIABETES MELLITUS WITH HYPERGLYCEMIA, WITH LONG-TERM CURRENT USE OF INSULIN (H): ICD-10-CM

## 2021-12-01 DIAGNOSIS — Z79.4 TYPE 2 DIABETES MELLITUS WITH HYPERGLYCEMIA, WITH LONG-TERM CURRENT USE OF INSULIN (H): ICD-10-CM

## 2021-12-01 DIAGNOSIS — Z11.59 ENCOUNTER FOR HEPATITIS C SCREENING TEST FOR LOW RISK PATIENT: ICD-10-CM

## 2021-12-01 DIAGNOSIS — D64.9 ANEMIA, UNSPECIFIED TYPE: ICD-10-CM

## 2021-12-01 DIAGNOSIS — E03.9 HYPOTHYROIDISM, UNSPECIFIED TYPE: ICD-10-CM

## 2021-12-01 LAB
ALBUMIN SERPL-MCNC: 4.1 G/DL (ref 3.5–5)
ALP SERPL-CCNC: 43 U/L (ref 45–120)
ALT SERPL W P-5'-P-CCNC: 29 U/L (ref 0–45)
ANION GAP SERPL CALCULATED.3IONS-SCNC: 12 MMOL/L (ref 5–18)
AST SERPL W P-5'-P-CCNC: 25 U/L (ref 0–40)
BILIRUB SERPL-MCNC: 0.4 MG/DL (ref 0–1)
BUN SERPL-MCNC: 16 MG/DL (ref 8–22)
CALCIUM SERPL-MCNC: 9.5 MG/DL (ref 8.5–10.5)
CHLORIDE BLD-SCNC: 107 MMOL/L (ref 98–107)
CHOLEST SERPL-MCNC: 157 MG/DL
CO2 SERPL-SCNC: 24 MMOL/L (ref 22–31)
CREAT SERPL-MCNC: 0.83 MG/DL (ref 0.6–1.1)
ERYTHROCYTE [DISTWIDTH] IN BLOOD BY AUTOMATED COUNT: 14.5 % (ref 10–15)
FASTING STATUS PATIENT QL REPORTED: YES
GFR SERPL CREATININE-BSD FRML MDRD: 77 ML/MIN/1.73M2
GLUCOSE BLD-MCNC: 112 MG/DL (ref 70–125)
HBA1C MFR BLD: 6.4 % (ref 0–5.6)
HCT VFR BLD AUTO: 40.8 % (ref 35–47)
HCV AB SERPL QL IA: NEGATIVE
HDLC SERPL-MCNC: 62 MG/DL
HGB BLD-MCNC: 12.7 G/DL (ref 11.7–15.7)
HIV 1+2 AB+HIV1 P24 AG SERPL QL IA: NEGATIVE
LDLC SERPL CALC-MCNC: 69 MG/DL
MCH RBC QN AUTO: 25.4 PG (ref 26.5–33)
MCHC RBC AUTO-ENTMCNC: 31.1 G/DL (ref 31.5–36.5)
MCV RBC AUTO: 82 FL (ref 78–100)
PLATELET # BLD AUTO: 242 10E3/UL (ref 150–450)
POTASSIUM BLD-SCNC: 4.2 MMOL/L (ref 3.5–5)
PROT SERPL-MCNC: 7.1 G/DL (ref 6–8)
RBC # BLD AUTO: 5 10E6/UL (ref 3.8–5.2)
SODIUM SERPL-SCNC: 143 MMOL/L (ref 136–145)
TRIGL SERPL-MCNC: 131 MG/DL
TSH SERPL DL<=0.005 MIU/L-ACNC: 0.58 UIU/ML (ref 0.3–5)
WBC # BLD AUTO: 4.9 10E3/UL (ref 4–11)

## 2021-12-01 PROCEDURE — 87389 HIV-1 AG W/HIV-1&-2 AB AG IA: CPT

## 2021-12-01 PROCEDURE — 36415 COLL VENOUS BLD VENIPUNCTURE: CPT

## 2021-12-01 PROCEDURE — 86803 HEPATITIS C AB TEST: CPT

## 2021-12-01 PROCEDURE — 85027 COMPLETE CBC AUTOMATED: CPT

## 2021-12-01 PROCEDURE — 80061 LIPID PANEL: CPT

## 2021-12-01 PROCEDURE — 80053 COMPREHEN METABOLIC PANEL: CPT

## 2021-12-01 PROCEDURE — 84443 ASSAY THYROID STIM HORMONE: CPT

## 2021-12-01 PROCEDURE — 83036 HEMOGLOBIN GLYCOSYLATED A1C: CPT

## 2021-12-02 DIAGNOSIS — E11.9 TYPE 2 DIABETES MELLITUS WITHOUT COMPLICATION, WITHOUT LONG-TERM CURRENT USE OF INSULIN (H): ICD-10-CM

## 2021-12-23 ENCOUNTER — ANCILLARY PROCEDURE (OUTPATIENT)
Dept: MAMMOGRAPHY | Facility: CLINIC | Age: 61
End: 2021-12-23
Attending: FAMILY MEDICINE
Payer: COMMERCIAL

## 2021-12-23 DIAGNOSIS — Z12.31 SCREENING MAMMOGRAM, ENCOUNTER FOR: ICD-10-CM

## 2021-12-23 PROCEDURE — 77067 SCR MAMMO BI INCL CAD: CPT | Mod: TC | Performed by: RADIOLOGY

## 2021-12-23 PROCEDURE — 77063 BREAST TOMOSYNTHESIS BI: CPT | Mod: TC | Performed by: RADIOLOGY

## 2021-12-29 ENCOUNTER — E-VISIT (OUTPATIENT)
Dept: URGENT CARE | Facility: CLINIC | Age: 61
End: 2021-12-29
Payer: COMMERCIAL

## 2021-12-29 DIAGNOSIS — Z20.822 SUSPECTED COVID-19 VIRUS INFECTION: Primary | ICD-10-CM

## 2021-12-29 PROCEDURE — 99421 OL DIG E/M SVC 5-10 MIN: CPT | Performed by: PHYSICIAN ASSISTANT

## 2021-12-29 NOTE — PATIENT INSTRUCTIONS
Yesenia,      Based on your responses, you may have coronavirus (COVID-19). This illness can cause fever, cough and trouble breathing. Many people get a mild case and get better on their own. Some people can get very sick.    Will I be tested for COVID-19?  We would like to test you for COVID-19 virus. I have placed orders for this test.     To schedule: go to your [facility] home page and scroll down to the section that says  You have an appointment that needs to be scheduled  and click the large green button that says  Schedule Now  and follow the steps to find the next available openings.    If you are unable to complete these [facility] scheduling steps, please call [phone] to schedule your testing.     Return to work/school/ guidance:  Please let your workplace manager and staffing office know when your isolation ends.       If you receive a positive COVID-19 test result, follow the guidance of the those who are giving you the results. Usually the return to work is 10 days from symptom onset or positive test date, (or in some cases 20 days if you are immunocompromised). If your symptoms started after your positive test, the 10 days should start when your symptoms started.   o If you work at [employer], you must also be cleared by Employee Occupational Health and Safety to return to work.      If you receive a negative COVID-19 test result and did not have a high risk exposure to someone with a known positive COVID-19 test, you can return to work once you're free of fever for 24 hours without fever-reducing medication and your symptoms are improving or resolved.    If you receive a negative COVID-19 test and had a high-risk exposure to someone who has tested positive for COVID-19 then you can return to work 14 days after your last contact with the positive individual. Follow quarantine guidance given by your doctor or public health officials.     Sign up for GetWell Loop:  We know it's scary to hear  that you might have COVID-19. We want to track your symptoms to make sure you're okay over the next 2 weeks. Please look for an email from [employer]--this is a free, online program that we'll use to keep in touch. To sign up, follow the link in the email you will receive. Learn more at [email or web address]    How can I take care of myself?  Over the counter medications may help with your symptoms like congestion, cough, chills, or fever. Try Mucinex for congestion, Robitussin DM maximum strength for cough, and tylenol or advil for your fever and/or pain.    There are not many effective prescription treatments for early COVID-19. Hydroxychloroquine, ivermectin, and azithromycin are not effective or recommended for COVID-19.    If your symptoms started in the last 10 days, you may be able to receive a treatment with monoclonal antibodies. This treatment can lower your risk of severe illness and going to the hospital. It is given through an IV or under your skin (subcutaneous) and must be given at an infusion center. You must be 12 or older, weight at least 88 pounds, and have a positive COVID-19 test.     If you would like to sign up to be considered to receive the monoclonal antibody medicine, please complete a participation form through the [facility] here: MNRAP ([email or web address]). You may also call the [facility] COVID-19 Public Hotline at [phone] (open Mon-Fri: 9am-7pm and Sat: 10am-6pm).     Not all people who are eligible will receive the medicine, since supply is limited. You will be contacted in the next 1 to 2 business days only if you are selected. If you do not receive a call, you have not been selected to receive the medicine. If you have any questions about this medication, please contact your primary care provider. For more information, see [email or web address]      Get lots of rest.  Drink extra fluids (unless a doctor has told you not to)    Take Tylenol (acetaminophen) or ibuprofen for fever or pain. If you have liver or kidney problems, ask your family doctor if it's okay to take Tylenol o ibuprofen    Take over the counter medications for your symptoms, as directed by your doctor. You may also talk to your pharmacist.      If you have other health problems (like cancer, heart failure, an organ transplant or severe kidney disease): Call your specialty clinic if you don't feel better in the next 2 days.    Know when to call 911. Emergency warning signs include:  o Trouble breathing or shortness of breath  o Pain or pressure in the chest that doesn't go away  o Feeling confused like you haven't felt before, or not being able to wake up  o Bluish-colored lips or face    Where can I get more information?    [facility] - About COVID-19: [email or web address]     CDC - What to Do If You're Sick:     www.cdc.gov/coronavirus/2019-ncov/about/steps-when-sick.html    CDC - Ending Home Isolation:  https://www.cdc.gov/coronavirus/2019-ncov/your-health/quarantine-isolation.html    CDC - Caring for Someone:  www.cdc.gov/coronavirus/2019-ncov/if-you-are-sick/care-for-someone.html    [facility] clinical trials (COVID-19 research studies): [email or web address].[email or web address]-clinical-trials    Below are the COVID-19 hotlines at the [facility] of Health ([facility]). Interpreters are available.  o For health questions: Call [phone] or [phone] (7 a.m. to 7 p.m.)  o For questions about schools and childcare: Call [phone] or [phone] (7 a.m. to 7 p.m.)

## 2021-12-31 ENCOUNTER — LAB (OUTPATIENT)
Dept: FAMILY MEDICINE | Facility: CLINIC | Age: 61
End: 2021-12-31
Attending: PHYSICIAN ASSISTANT
Payer: COMMERCIAL

## 2021-12-31 DIAGNOSIS — Z20.822 SUSPECTED COVID-19 VIRUS INFECTION: ICD-10-CM

## 2021-12-31 PROCEDURE — U0003 INFECTIOUS AGENT DETECTION BY NUCLEIC ACID (DNA OR RNA); SEVERE ACUTE RESPIRATORY SYNDROME CORONAVIRUS 2 (SARS-COV-2) (CORONAVIRUS DISEASE [COVID-19]), AMPLIFIED PROBE TECHNIQUE, MAKING USE OF HIGH THROUGHPUT TECHNOLOGIES AS DESCRIBED BY CMS-2020-01-R: HCPCS

## 2021-12-31 PROCEDURE — U0005 INFEC AGEN DETEC AMPLI PROBE: HCPCS

## 2022-01-03 ENCOUNTER — TELEPHONE (OUTPATIENT)
Dept: FAMILY MEDICINE | Facility: CLINIC | Age: 62
End: 2022-01-03
Payer: COMMERCIAL

## 2022-01-03 NOTE — TELEPHONE ENCOUNTER
Patient received lots of information with her E-visit including how to apply for monoclonal antibodies.  I do not have anything in addition to add to this guidance.  Please let me know if she has specific questions.

## 2022-01-03 NOTE — TELEPHONE ENCOUNTER
Patient calling to update Dr Chavez.  She did test positive for Covid and would like to get advice on what to do about it.  Since she is a diabetic, she is concerned.  Suggested that the patient schedule an evisit but she said she only wanted to talk to the doctor and didn't want to have to pay for it.  Please call patient

## 2022-01-03 NOTE — TELEPHONE ENCOUNTER
Sent her a [employer] message asking her to review the information in her evisit and let us know if she has further questions

## 2022-01-14 ENCOUNTER — TRANSFERRED RECORDS (OUTPATIENT)
Dept: HEALTH INFORMATION MANAGEMENT | Facility: CLINIC | Age: 62
End: 2022-01-14
Payer: COMMERCIAL

## 2022-01-14 LAB — RETINOPATHY: POSITIVE

## 2022-03-15 ENCOUNTER — MYC MEDICAL ADVICE (OUTPATIENT)
Dept: FAMILY MEDICINE | Facility: CLINIC | Age: 62
End: 2022-03-15
Payer: COMMERCIAL

## 2022-03-15 DIAGNOSIS — E11.65 TYPE 2 DIABETES MELLITUS WITH HYPERGLYCEMIA, WITH LONG-TERM CURRENT USE OF INSULIN (H): Primary | ICD-10-CM

## 2022-03-15 DIAGNOSIS — Z79.4 TYPE 2 DIABETES MELLITUS WITH HYPERGLYCEMIA, WITH LONG-TERM CURRENT USE OF INSULIN (H): Primary | ICD-10-CM

## 2022-03-15 RX ORDER — INSULIN DEGLUDEC 100 U/ML
14 INJECTION, SOLUTION SUBCUTANEOUS DAILY
Qty: 45 ML | Refills: 3 | Status: SHIPPED | OUTPATIENT
Start: 2022-03-15 | End: 2022-09-19

## 2022-03-27 DIAGNOSIS — E03.9 HYPOTHYROIDISM, UNSPECIFIED TYPE: ICD-10-CM

## 2022-03-30 RX ORDER — LEVOTHYROXINE SODIUM 112 UG/1
TABLET ORAL
Qty: 90 TABLET | Refills: 2 | Status: SHIPPED | OUTPATIENT
Start: 2022-03-30 | End: 2022-12-23

## 2022-03-30 NOTE — TELEPHONE ENCOUNTER
"Last Written Prescription Date:  4/2/21  Last Fill Quantity: 90,  # refills: 3   Last office visit provider:  11/23/21     Requested Prescriptions   Pending Prescriptions Disp Refills     levothyroxine (SYNTHROID/LEVOTHROID) 112 MCG tablet [Pharmacy Med Name: L-THYROXINE (SYNTHROID) TABS 112MCG] 90 tablet 3     Sig: TAKE 1 TABLET DAILY AT 6:00 A.M.       Thyroid Protocol Passed - 3/27/2022 11:09 PM        Passed - Patient is 12 years or older        Passed - Recent (12 mo) or future (30 days) visit within the authorizing provider's specialty     Patient has had an office visit with the authorizing provider or a provider within the authorizing providers department within the previous 12 mos or has a future within next 30 days. See \"Patient Info\" tab in inbasket, or \"Choose Columns\" in Meds & Orders section of the refill encounter.              Passed - Medication is active on med list        Passed - Normal TSH on file in past 12 months     Recent Labs   Lab Test 12/01/21  0707   TSH 0.58              Passed - No active pregnancy on record     If patient is pregnant or has had a positive pregnancy test, please check TSH.          Passed - No positive pregnancy test in past 12 months     If patient is pregnant or has had a positive pregnancy test, please check TSH.               Olivia Farr RN 03/30/22 7:53 AM  "

## 2022-05-15 DIAGNOSIS — E11.9 TYPE 2 DIABETES MELLITUS WITHOUT COMPLICATION, WITHOUT LONG-TERM CURRENT USE OF INSULIN (H): ICD-10-CM

## 2022-05-16 RX ORDER — EMPAGLIFLOZIN 25 MG/1
TABLET, FILM COATED ORAL
Qty: 90 TABLET | Refills: 0 | Status: SHIPPED | OUTPATIENT
Start: 2022-05-16 | End: 2022-08-15

## 2022-05-16 NOTE — TELEPHONE ENCOUNTER
"Last Written Prescription Date:  6/2/21  Last Fill Quantity: 90,  # refills: 3   Last office visit provider:  11/23/21     Requested Prescriptions   Pending Prescriptions Disp Refills     JARDIANCE 25 MG TABS tablet [Pharmacy Med Name: JARDIANCE TABS 25MG] 90 tablet 3     Sig: TAKE 1 TABLET DAILY       Sodium Glucose Co-Transport Inhibitor Agents Passed - 5/15/2022 11:13 PM        Passed - Patient has documented A1c within the specified period of time.     If HgbA1C is 8 or greater, it needs to be on file within the past 3 months.  If less than 8, must be on file within the past 6 months.     Recent Labs   Lab Test 12/01/21  0707   A1C 6.4*             Passed - No creatinine >1.4 or GFR <45 within the past 12 mos     Recent Labs   Lab Test 12/01/21  0707 10/05/20  0800   GFRESTIMATED 77 >60   GFRESTBLACK  --  >60       Recent Labs   Lab Test 12/01/21  0707   CR 0.83             Passed - Medication is active on med list        Passed - Patient is age 18 or older        Passed - Patient is not pregnant        Passed - Patient has documented normal Potassium within the last 12 mos.     Recent Labs   Lab Test 12/01/21  0707   POTASSIUM 4.2             Passed - Patient has no positive pregnancy test within the past 12 mos.        Passed - Recent (6 mo) or future (30 days) visit within the authorizing provider's specialty     Patient had office visit in the last 6 months or has a visit in the next 30 days with authorizing provider or within the authorizing provider's specialty.  See \"Patient Info\" tab in inbasket, or \"Choose Columns\" in Meds & Orders section of the refill encounter.                 Danielle Veloz RN 05/16/22 5:22 PM  "

## 2022-07-21 ENCOUNTER — MYC REFILL (OUTPATIENT)
Dept: FAMILY MEDICINE | Facility: CLINIC | Age: 62
End: 2022-07-21

## 2022-07-21 DIAGNOSIS — E11.9 TYPE 2 DIABETES MELLITUS WITHOUT COMPLICATION, WITHOUT LONG-TERM CURRENT USE OF INSULIN (H): ICD-10-CM

## 2022-07-22 NOTE — TELEPHONE ENCOUNTER
Pended generic test strips. Hopefully pharmacy can run by insurance for approval of covered brand.    Karla Fink RN, BSN  [facility]

## 2022-07-22 NOTE — TELEPHONE ENCOUNTER
I can no longer order the Onetouch ultra, when I click on it I have to pick a different brand.  Can you try to click on it and pick which one I should use as an alternative?

## 2022-07-22 NOTE — TELEPHONE ENCOUNTER
"Routing refill request to provider for review/approval because:  A break in medication  Patient needs to be seen because it has been more than 6 months since last office visit.    Last Written Prescription Date:  12/14/20  Last Fill Quantity: 300,  # refills: 3   Last office visit provider:  11/23/21     Requested Prescriptions   Pending Prescriptions Disp Refills     blood glucose (ONETOUCH ULTRA) test strip 300 strip 3     Sig: Use to test blood sugar 3 times daily or as directed.       Diabetic Supplies Protocol Failed - 7/21/2022  3:02 PM        Failed - Recent (6 mo) or future (30 days) visit within the authorizing provider's specialty     Patient had office visit in the last 6 months or has a visit in the next 30 days with authorizing provider.  See \"Patient Info\" tab in inbasket, or \"Choose Columns\" in Meds & Orders section of the refill encounter.            Passed - Medication is active on med list        Passed - Patient is 18 years of age or older             Brooks David RN 07/22/22 3:27 PM  "

## 2022-07-24 ENCOUNTER — HEALTH MAINTENANCE LETTER (OUTPATIENT)
Age: 62
End: 2022-07-24

## 2022-09-02 ENCOUNTER — TELEPHONE (OUTPATIENT)
Dept: FAMILY MEDICINE | Facility: CLINIC | Age: 62
End: 2022-09-02

## 2022-09-02 NOTE — TELEPHONE ENCOUNTER
Called and spoke with patient, relayed PCP recommendations. Patient stated understanding and is in agreement with plan.    Jessica Ochoa RN

## 2022-09-02 NOTE — TELEPHONE ENCOUNTER
Nope.  The tresiba is basal insulin, so won't acutely lower the blood sugar.  I wouldn't change her regimen based on one elevated reading.

## 2022-09-02 NOTE — TELEPHONE ENCOUNTER
Went to [facility] last night,    Woke up to , normally in 90's in the AM.    The patient is asking if she should take half of her Tresiba this morning to lower her sugar and the other half at night tonight.    Jacob Rey RN     [facility]

## 2022-09-19 DIAGNOSIS — Z79.4 TYPE 2 DIABETES MELLITUS WITH HYPERGLYCEMIA, WITH LONG-TERM CURRENT USE OF INSULIN (H): ICD-10-CM

## 2022-09-19 DIAGNOSIS — E11.65 TYPE 2 DIABETES MELLITUS WITH HYPERGLYCEMIA, WITH LONG-TERM CURRENT USE OF INSULIN (H): ICD-10-CM

## 2022-09-19 RX ORDER — INSULIN DEGLUDEC 100 U/ML
14 INJECTION, SOLUTION SUBCUTANEOUS DAILY
Qty: 15 ML | Refills: 0 | Status: SHIPPED | OUTPATIENT
Start: 2022-09-19 | End: 2023-06-09

## 2022-09-19 NOTE — TELEPHONE ENCOUNTER
Pending Prescriptions:                       Disp   Refills    insulin degludec (TRESIBA FLEXTOUCH) 100 *15 mL  0            Sig: Inject 14 Units Subcutaneous daily

## 2022-10-02 ENCOUNTER — HEALTH MAINTENANCE LETTER (OUTPATIENT)
Age: 62
End: 2022-10-02

## 2022-10-03 DIAGNOSIS — E11.65 TYPE 2 DIABETES MELLITUS WITH HYPERGLYCEMIA, WITH LONG-TERM CURRENT USE OF INSULIN (H): ICD-10-CM

## 2022-10-03 DIAGNOSIS — Z79.4 TYPE 2 DIABETES MELLITUS WITH HYPERGLYCEMIA, WITH LONG-TERM CURRENT USE OF INSULIN (H): ICD-10-CM

## 2022-10-04 ENCOUNTER — TELEPHONE (OUTPATIENT)
Dept: FAMILY MEDICINE | Facility: CLINIC | Age: 62
End: 2022-10-04

## 2022-10-04 RX ORDER — SEMAGLUTIDE 1.34 MG/ML
INJECTION, SOLUTION SUBCUTANEOUS
Qty: 4.5 ML | Refills: 0 | Status: SHIPPED | OUTPATIENT
Start: 2022-10-04 | End: 2022-12-27

## 2022-10-04 NOTE — TELEPHONE ENCOUNTER
Incoming fax from -[facility] requesting alternative alternatives for Tresiba and OneTouch Ultra.  Therapeutic Alternatives are Humulin 100 units/mL, Semglee Pen 100 unit/mL, OneTouch Verio glucometer and test strips. Please advise.

## 2022-10-04 NOTE — TELEPHONE ENCOUNTER
"Routing refill request to provider for review/approval because:  Labs not current:  A1C  Patient needs to be seen because it has been more than 6 months since last office visit.    Last Written Prescription Date:  7/12/22  Last Fill Quantity: 4.5 ml,  # refills: 0   Last office visit provider:  11/23/21     Requested Prescriptions   Pending Prescriptions Disp Refills     OZEMPIC (0.25 OR 0.5 MG/DOSE) 2 MG/1.5ML SOPN pen [Pharmacy Med Name: OZEMPIC 0.25 OR 0.5 MG/DOSE PEN INJ 2MG/1.5ML] 4.5 mL 3     Sig: INJECT 0.5 MG UNDER THE SKIN EVERY 7 DAYS       GLP-1 Agonists Protocol Failed - 10/4/2022  1:59 PM        Failed - HgbA1C in past 3 or 6 months     If HgbA1C is 8 or greater, it needs to be on file within the past 3 months.  If less than 8, must be on file within the past 6 months.     Recent Labs   Lab Test 12/01/21  0707   A1C 6.4*             Failed - Recent (6 mo) or future (30 days) visit within the authorizing provider's specialty     Patient had office visit in the last 6 months or has a visit in the next 30 days with authorizing provider.  See \"Patient Info\" tab in inbasket, or \"Choose Columns\" in Meds & Orders section of the refill encounter.            Passed - Medication is active on med list        Passed - Patient is age 18 or older        Passed - No active pregnancy on record        Passed - Normal serum creatinine on file in past 12 months     Recent Labs   Lab Test 12/01/21  0707   CR 0.83       Ok to refill medication if creatinine is low          Passed - No positive pregnancy test in past 12 months             Brooks David RN 10/04/22 1:59 PM  "

## 2022-10-04 NOTE — TELEPHONE ENCOUNTER
Per previous conversation with Norah, diabetic educator, suggested meds are not equivalent.  Tossing form.

## 2022-10-10 ENCOUNTER — TELEPHONE (OUTPATIENT)
Dept: FAMILY MEDICINE | Facility: CLINIC | Age: 62
End: 2022-10-10

## 2022-10-10 NOTE — TELEPHONE ENCOUNTER
Reason for call:  Medication   If this is a refill request, has the caller requested the refill from the pharmacy already? N/A  Will the patient be using a [facility]? No  Name of the pharmacy and phone number for the current request: [facility] [phone]    Name of the medication requested: Pt requesting a prescription for xovirax, for cold sores    Other request: please call pt when prescription is sent    Phone number to reach patient:  Home number on file [phone] (home)    Best Time:  any    Can we leave a detailed message on this number?  YES    Travel screening: Not Applicable

## 2022-10-10 NOTE — TELEPHONE ENCOUNTER
I cannot tell if we have discussed this in the past, but this medication is not on her med list.  Please have her submit an E-visit for this and I will send a prescription, thanks.

## 2022-10-19 DIAGNOSIS — E11.9 TYPE 2 DIABETES MELLITUS WITHOUT COMPLICATION, WITHOUT LONG-TERM CURRENT USE OF INSULIN (H): ICD-10-CM

## 2022-10-20 RX ORDER — BLOOD SUGAR DIAGNOSTIC
STRIP MISCELLANEOUS
Qty: 300 STRIP | Refills: 3 | Status: SHIPPED | OUTPATIENT
Start: 2022-10-20 | End: 2023-10-18

## 2022-10-21 NOTE — TELEPHONE ENCOUNTER
"Last Written Prescription Date:  7/22/2022  Last Fill Quantity: 300,  # refills: 0   Last office visit provider:  11/23/2021     Requested Prescriptions   Pending Prescriptions Disp Refills     ONETOUCH ULTRA test strip [Pharmacy Med Name: ONE TOUCH ULTRA STRIPS 100'S] 300 strip 3     Sig: USE TO TEST BLOOD SUGAR THREE TIMES A DAY OR AS DIRECTED       Diabetic Supplies Protocol Passed - 10/19/2022 11:08 PM        Passed - Medication is active on med list        Passed - Patient is 18 years of age or older        Passed - Recent (6 mo) or future (30 days) visit within the authorizing provider's specialty     Patient had office visit in the last 6 months or has a visit in the next 30 days with authorizing provider.  See \"Patient Info\" tab in inbasket, or \"Choose Columns\" in Meds & Orders section of the refill encounter.                 Irlanda Moore RN 10/20/22 8:08 PM  "

## 2022-10-23 DIAGNOSIS — E11.9 TYPE 2 DIABETES MELLITUS WITHOUT COMPLICATION, WITHOUT LONG-TERM CURRENT USE OF INSULIN (H): ICD-10-CM

## 2022-10-24 RX ORDER — PEN NEEDLE, DIABETIC 32GX 5/32"
NEEDLE, DISPOSABLE MISCELLANEOUS
Qty: 100 EACH | Refills: 0 | Status: SHIPPED | OUTPATIENT
Start: 2022-10-24 | End: 2023-01-23

## 2022-10-25 NOTE — TELEPHONE ENCOUNTER
"Last Written Prescription Date:  4/27/22  Last Fill Quantity: 90,  # refills: 1   Last office visit provider:  11/23/21     Requested Prescriptions   Pending Prescriptions Disp Refills     BD GEORGIE U/F 32G X 4 MM insulin pen needle [Pharmacy Med Name: BD PEN EMILIO UF GEORGIE 4MM 90'S 32G5/32]  3     Sig: USE 1 PEN NEEDLE DAILY AS DIRECTED       Diabetic Supplies Protocol Passed - 10/23/2022 11:08 PM        Passed - Medication is active on med list        Passed - Patient is 18 years of age or older        Passed - Recent (6 mo) or future (30 days) visit within the authorizing provider's specialty     Patient had office visit in the last 6 months or has a visit in the next 30 days with authorizing provider.  See \"Patient Info\" tab in inbasket, or \"Choose Columns\" in Meds & Orders section of the refill encounter.                 Danielle Veloz RN 10/24/22 8:57 PM  "

## 2022-11-13 DIAGNOSIS — E78.5 HYPERLIPIDEMIA: ICD-10-CM

## 2022-11-13 DIAGNOSIS — E11.9 TYPE 2 DIABETES MELLITUS WITHOUT COMPLICATION, WITHOUT LONG-TERM CURRENT USE OF INSULIN (H): ICD-10-CM

## 2022-11-14 RX ORDER — SIMVASTATIN 40 MG
TABLET ORAL
Qty: 90 TABLET | Refills: 3 | Status: SHIPPED | OUTPATIENT
Start: 2022-11-14 | End: 2023-11-07

## 2022-11-14 NOTE — TELEPHONE ENCOUNTER
"Routing refill request to provider for review/approval because:  Labs not current:  a1c  Due for ldl and be seen    Last Written Prescription Date:  11/16/21  Last Fill Quantity: 180,  # refills: 3   Last office visit provider:  11/23/21     Requested Prescriptions   Pending Prescriptions Disp Refills     metFORMIN (GLUCOPHAGE) 1000 MG tablet [Pharmacy Med Name: METFORMIN HCL TABS 1000MG] 180 tablet 3     Sig: TAKE 1 TABLET TWICE A DAY WITH MEALS       Biguanide Agents Failed - 11/13/2022 11:13 PM        Failed - Patient has documented A1c within the specified period of time.     If HgbA1C is 8 or greater, it needs to be on file within the past 3 months.  If less than 8, must be on file within the past 6 months.     Recent Labs   Lab Test 12/01/21 0707   A1C 6.4*             Passed - Patient is age 10 or older        Passed - Patient's CR is NOT>1.4 OR Patient's EGFR is NOT<45 within past 12 mos.     Recent Labs   Lab Test 12/01/21  0707 10/05/20  0800   GFRESTIMATED 77 >60   GFRESTBLACK  --  >60       Recent Labs   Lab Test 12/01/21 0707   CR 0.83             Passed - Patient does NOT have a diagnosis of CHF.        Passed - Medication is active on med list        Passed - Patient is not pregnant        Passed - Patient has not had a positive pregnancy test within the past 12 mos.         Passed - Recent (6 mo) or future (30 days) visit within the authorizing provider's specialty     Patient had office visit in the last 6 months or has a visit in the next 30 days with authorizing provider or within the authorizing provider's specialty.  See \"Patient Info\" tab in inbasket, or \"Choose Columns\" in Meds & Orders section of the refill encounter.               simvastatin (ZOCOR) 40 MG tablet [Pharmacy Med Name: SIMVASTATIN TABS 40MG] 90 tablet 3     Sig: TAKE 1 TABLET AT BEDTIME       Statins Protocol Passed - 11/13/2022 11:13 PM        Passed - LDL on file in past 12 months     Recent Labs   Lab Test 12/01/21  0707 " "  LDL 69             Passed - No abnormal creatine kinase in past 12 months     No lab results found.             Passed - Recent (12 mo) or future (30 days) visit within the authorizing provider's specialty     Patient has had an office visit with the authorizing provider or a provider within the authorizing providers department within the previous 12 mos or has a future within next 30 days. See \"Patient Info\" tab in inbasket, or \"Choose Columns\" in Meds & Orders section of the refill encounter.              Passed - Medication is active on med list        Passed - Patient is age 18 or older        Passed - No active pregnancy on record        Passed - No positive pregnancy test in past 12 months             Danielle Veloz, RN 11/14/22 2:11 PM  "

## 2022-11-17 ENCOUNTER — OFFICE VISIT (OUTPATIENT)
Dept: FAMILY MEDICINE | Facility: CLINIC | Age: 62
End: 2022-11-17
Payer: COMMERCIAL

## 2022-11-17 VITALS
RESPIRATION RATE: 16 BRPM | DIASTOLIC BLOOD PRESSURE: 63 MMHG | HEART RATE: 71 BPM | HEIGHT: 61 IN | WEIGHT: 147.2 LBS | OXYGEN SATURATION: 100 % | SYSTOLIC BLOOD PRESSURE: 116 MMHG | BODY MASS INDEX: 27.79 KG/M2 | TEMPERATURE: 97.8 F

## 2022-11-17 DIAGNOSIS — G89.29 CHRONIC RIGHT SHOULDER PAIN: ICD-10-CM

## 2022-11-17 DIAGNOSIS — E11.65 TYPE 2 DIABETES MELLITUS WITH HYPERGLYCEMIA, WITH LONG-TERM CURRENT USE OF INSULIN (H): ICD-10-CM

## 2022-11-17 DIAGNOSIS — M25.511 CHRONIC RIGHT SHOULDER PAIN: ICD-10-CM

## 2022-11-17 DIAGNOSIS — Z00.00 ROUTINE GENERAL MEDICAL EXAMINATION AT A HEALTH CARE FACILITY: Primary | ICD-10-CM

## 2022-11-17 DIAGNOSIS — E03.9 HYPOTHYROIDISM, UNSPECIFIED TYPE: ICD-10-CM

## 2022-11-17 DIAGNOSIS — Z78.0 POSTMENOPAUSAL STATUS: ICD-10-CM

## 2022-11-17 DIAGNOSIS — Z79.4 TYPE 2 DIABETES MELLITUS WITH HYPERGLYCEMIA, WITH LONG-TERM CURRENT USE OF INSULIN (H): ICD-10-CM

## 2022-11-17 DIAGNOSIS — E78.2 MIXED HYPERLIPIDEMIA: ICD-10-CM

## 2022-11-17 PROBLEM — M65.30 TRIGGER FINGER, ACQUIRED: Status: RESOLVED | Noted: 2020-06-01 | Resolved: 2022-11-17

## 2022-11-17 PROBLEM — M72.2 PLANTAR FASCIITIS: Status: RESOLVED | Noted: 2020-09-03 | Resolved: 2022-11-17

## 2022-11-17 LAB
ALBUMIN SERPL BCG-MCNC: 4.6 G/DL (ref 3.5–5.2)
ALP SERPL-CCNC: 38 U/L (ref 35–104)
ALT SERPL W P-5'-P-CCNC: 25 U/L (ref 10–35)
ANION GAP SERPL CALCULATED.3IONS-SCNC: 11 MMOL/L (ref 7–15)
AST SERPL W P-5'-P-CCNC: 24 U/L (ref 10–35)
BILIRUB SERPL-MCNC: 0.3 MG/DL
BUN SERPL-MCNC: 23 MG/DL (ref 8–23)
CALCIUM SERPL-MCNC: 9.4 MG/DL (ref 8.8–10.2)
CHLORIDE SERPL-SCNC: 105 MMOL/L (ref 98–107)
CHOLEST SERPL-MCNC: 158 MG/DL
CREAT SERPL-MCNC: 0.79 MG/DL (ref 0.51–0.95)
CREAT UR-MCNC: 117 MG/DL
DEPRECATED HCO3 PLAS-SCNC: 24 MMOL/L (ref 22–29)
GFR SERPL CREATININE-BSD FRML MDRD: 85 ML/MIN/1.73M2
GLUCOSE SERPL-MCNC: 82 MG/DL (ref 70–99)
HBA1C MFR BLD: 6.5 % (ref 0–5.6)
HDLC SERPL-MCNC: 57 MG/DL
LDLC SERPL CALC-MCNC: 73 MG/DL
MICROALBUMIN UR-MCNC: <12 MG/L
MICROALBUMIN/CREAT UR: NORMAL MG/G{CREAT}
NONHDLC SERPL-MCNC: 101 MG/DL
POTASSIUM SERPL-SCNC: 3.9 MMOL/L (ref 3.4–5.3)
PROT SERPL-MCNC: 7.5 G/DL (ref 6.4–8.3)
SODIUM SERPL-SCNC: 140 MMOL/L (ref 136–145)
TRIGL SERPL-MCNC: 141 MG/DL
TSH SERPL DL<=0.005 MIU/L-ACNC: 0.34 UIU/ML (ref 0.3–4.2)

## 2022-11-17 PROCEDURE — 90471 IMMUNIZATION ADMIN: CPT | Performed by: FAMILY MEDICINE

## 2022-11-17 PROCEDURE — 80061 LIPID PANEL: CPT | Performed by: FAMILY MEDICINE

## 2022-11-17 PROCEDURE — 36415 COLL VENOUS BLD VENIPUNCTURE: CPT | Performed by: FAMILY MEDICINE

## 2022-11-17 PROCEDURE — 84443 ASSAY THYROID STIM HORMONE: CPT | Performed by: FAMILY MEDICINE

## 2022-11-17 PROCEDURE — 99396 PREV VISIT EST AGE 40-64: CPT | Mod: 25 | Performed by: FAMILY MEDICINE

## 2022-11-17 PROCEDURE — 90715 TDAP VACCINE 7 YRS/> IM: CPT | Performed by: FAMILY MEDICINE

## 2022-11-17 PROCEDURE — 90677 PCV20 VACCINE IM: CPT | Performed by: FAMILY MEDICINE

## 2022-11-17 PROCEDURE — 82043 UR ALBUMIN QUANTITATIVE: CPT | Performed by: FAMILY MEDICINE

## 2022-11-17 PROCEDURE — 80053 COMPREHEN METABOLIC PANEL: CPT | Performed by: FAMILY MEDICINE

## 2022-11-17 PROCEDURE — 83036 HEMOGLOBIN GLYCOSYLATED A1C: CPT | Performed by: FAMILY MEDICINE

## 2022-11-17 PROCEDURE — 90472 IMMUNIZATION ADMIN EACH ADD: CPT | Performed by: FAMILY MEDICINE

## 2022-11-17 PROCEDURE — 99214 OFFICE O/P EST MOD 30 MIN: CPT | Mod: 25 | Performed by: FAMILY MEDICINE

## 2022-11-17 ASSESSMENT — ENCOUNTER SYMPTOMS
NAUSEA: 0
EYE PAIN: 0
COUGH: 0
DYSURIA: 0
DIZZINESS: 0
PALPITATIONS: 0
FEVER: 0
HEARTBURN: 0
WEAKNESS: 0
HEADACHES: 0
ABDOMINAL PAIN: 0
ARTHRALGIAS: 1
FREQUENCY: 0
JOINT SWELLING: 0
BREAST MASS: 0
CONSTIPATION: 0
HEMATURIA: 0
DIARRHEA: 0
PARESTHESIAS: 0
MYALGIAS: 0
SORE THROAT: 0
SHORTNESS OF BREATH: 0
HEMATOCHEZIA: 0
NERVOUS/ANXIOUS: 0
CHILLS: 0

## 2022-11-17 NOTE — PATIENT INSTRUCTIONS
We want pre-meal or morning fasting sugars under 130.  We want 2 hour post-meal sugars under 180.      Preventive Health Recommendations  Female Ages 50 - 64    Yearly exam: See your health care provider every year in order to  o Review health changes.   o Discuss preventive care.    o Review your medicines if your doctor has prescribed any.      Get a Pap test every three years (unless you have an abnormal result and your provider advises testing more often).    If you get Pap tests with HPV test, you only need to test every 5 years, unless you have an abnormal result.     You do not need a Pap test if your uterus was removed (hysterectomy) and you have not had cancer.    You should be tested each year for STDs (sexually transmitted diseases) if you're at risk.     Have a mammogram every 1 to 2 years.    Have a colonoscopy at age 50, or have a yearly FIT test (stool test). These exams screen for colon cancer.      Have a cholesterol test every 5 years, or more often if advised.    Have a diabetes test (fasting glucose) every three years. If you are at risk for diabetes, you should have this test more often.     If you are at risk for osteoporosis (brittle bone disease), think about having a bone density scan (DEXA).    Shots: Get a flu shot each year. Get a tetanus shot every 10 years.    Nutrition:     Eat at least 5 servings of fruits and vegetables each day.    Eat whole-grain bread, whole-wheat pasta and brown rice instead of white grains and rice.    Get adequate Calcium and Vitamin D.     Lifestyle    Exercise at least 150 minutes a week (30 minutes a day, 5 days a week). This will help you control your weight and prevent disease.    Limit alcohol to one drink per day.    No smoking.     Wear sunscreen to prevent skin cancer.     See your dentist every six months for an exam and cleaning.    See your eye doctor every 1 to 2 years.

## 2022-11-17 NOTE — PROGRESS NOTES
SUBJECTIVE:   CC: Yesenia is an 62 year old who presents for preventive health visit.     Patient has been advised of split billing requirements and indicates understanding: Yes     Healthy Habits:     Getting at least 3 servings of Calcium per day:  NO    Bi-annual eye exam:  Yes    Dental care twice a year:  Yes    Sleep apnea or symptoms of sleep apnea:  None    Diet:  Diabetic    Frequency of exercise:  2-3 days/week    Duration of exercise:  45-60 minutes    Taking medications regularly:  Yes    Medication side effects:  None    PHQ-2 Total Score: 0    Additional concerns today:  Yes    Problems to Add:  Anterior right shoulder pain, worse with reaching above the head.  Doesn't recall any specific injury or new activity.    Diabetes Follow-up  How often are you checking your blood sugar? One time daily  What time of day are you checking your blood sugars (select all that apply)?  Before Breakfast  Have you had any blood sugars above 200?  No  Have you had any blood sugars below 70?  No    What symptoms do you notice when your blood sugar is low?  Shaky and Weak    What concerns do you have today about your diabetes? None     Do you have any of these symptoms? (Select all that apply)  No numbness or tingling in feet.  No redness, sores or blisters on feet.  No complaints of excessive thirst.  No reports of blurry vision.  No significant changes to weight.  Blood sugar this morning was 80, usually 100-115.      BP Readings from Last 2 Encounters:   11/17/22 116/63   11/23/21 129/60     Hemoglobin A1C (%)   Date Value   12/01/2021 6.4 (H)   06/03/2021 6.9 (H)     LDL Cholesterol Calculated (mg/dL)   Date Value   12/01/2021 69   10/05/2020 78     LDL Cholesterol Direct (mg/dL)   Date Value   09/20/2011 105   06/28/2011 110               Hyperlipidemia Follow-Up    Are you regularly taking any medication or supplement to lower your cholesterol?   Yes- Simvastatin 40 mg daily    Are you having muscle aches or other  side effects that you think could be caused by your cholesterol lowering medication?  Right arm pain    Hypothyroidism Follow-up    Since last visit, patient describes the following symptoms: dry skin and hair loss      Today's PHQ-2 Score:   PHQ-2 ( 1999 [employer]) 11/17/2022   Q1: Little interest or pleasure in doing things 0   Q2: Feeling down, depressed or hopeless 0   PHQ-2 Score 0   Q1: Little interest or pleasure in doing things Not at all   Q2: Feeling down, depressed or hopeless Not at all   PHQ-2 Score 0       Social History     Tobacco Use     Smoking status: Never     Smokeless tobacco: Never   Substance Use Topics     Alcohol use: Yes     Alcohol/week: 0.0 - 6.0 standard drinks     Comment: rare       Alcohol Use 11/17/2022   Prescreen: >3 drinks/day or >7 drinks/week? No     Reviewed orders with patient.  Reviewed health maintenance and updated orders accordingly - Yes    BP Readings from Last 3 Encounters:   11/17/22 116/63   11/23/21 129/60   06/03/21 118/62    Wt Readings from Last 3 Encounters:   11/17/22 66.8 kg (147 lb 3.2 oz)   11/23/21 66.8 kg (147 lb 4 oz)   06/03/21 65.8 kg (145 lb 2 oz)                  Patient Active Problem List   Diagnosis     Hypothyroidism     Mixed hyperlipidemia     Anemia     Type 2 diabetes mellitus with hyperglycemia, with long-term current use of insulin (H)     Diverticular disease of colon     Past Surgical History:   Procedure Laterality Date     ANKLE FRACTURE SURGERY Right 2005     COLONOSCOPY  2013     HC CORRECT BUNION,DOUBLE OSTEOTOMY      Description: Hallux Valgus (Bunion) Correction;  Recorded: 10/29/2013;  Comments: age 13     HC REVISE MEDIAN N/CARPAL TUNNEL SURG      Description: Neuroplasty Decompression Median Nerve At Carpal Tunnel;  Recorded: 10/26/2010;     SOFT TISSUE SURGERY  trigger finger and carpal tunnel       Social History     Tobacco Use     Smoking status: Never     Smokeless tobacco: Never   Substance Use Topics     Alcohol use: Yes      Alcohol/week: 0.0 - 6.0 standard drinks     Comment: rare     Family History   Problem Relation Age of Onset     Cancer Mother         lymphoma     Cerebrovascular Disease Mother      Hyperlipidemia Mother      Osteoporosis Mother      Heart Disease Father         bypass at 55     Hyperlipidemia Father      Heart Disease Brother         CAD s/p stents     Diabetes Brother      Coronary Artery Disease Brother      Cancer Maternal Grandmother         cervical     Heart Disease Paternal Grandmother      Breast Cancer No family hx of      Colon Cancer No family hx of      Diabetes No family hx of          Current Outpatient Medications   Medication Sig Dispense Refill     BD GEORGIE U/F 32G X 4 MM insulin pen needle USE 1 PEN NEEDLE DAILY AS DIRECTED 100 each 0     COQ10, UBIQUINOL, ORAL [COQ10, UBIQUINOL, ORAL] Take by mouth.       insulin degludec (TRESIBA FLEXTOUCH) 100 UNIT/ML pen Inject 14 Units Subcutaneous daily 15 mL 0     JARDIANCE 25 MG TABS tablet TAKE 1 TABLET DAILY 90 tablet 1     levothyroxine (SYNTHROID/LEVOTHROID) 112 MCG tablet TAKE 1 TABLET DAILY AT 6:00 A.M. 90 tablet 2     metFORMIN (GLUCOPHAGE) 1000 MG tablet TAKE 1 TABLET TWICE A DAY WITH MEALS 180 tablet 3     ONETOUCH ULTRA BLUE TEST STRIP strips [ONETOUCH ULTRA BLUE TEST STRIP STRIPS] USE 1 STRIP AS DIRECTED THREE TIMES A  strip 3     ONETOUCH ULTRA test strip USE TO TEST BLOOD SUGAR THREE TIMES A DAY OR AS DIRECTED 300 strip 3     OZEMPIC, 0.25 OR 0.5 MG/DOSE, 2 MG/1.5ML SOPN pen INJECT 0.5 MG UNDER THE SKIN EVERY 7 DAYS 4.5 mL 0     simvastatin (ZOCOR) 40 MG tablet TAKE 1 TABLET AT BEDTIME 90 tablet 3     No Known Allergies    Breast Cancer Screening:    Breast CA Risk Assessment (FHS-7) 11/22/2021 12/23/2021   Do you have a family history of breast, colon, or ovarian cancer? No / Unknown No / Unknown         Mammogram Screening: Recommended mammography every 1-2 years with patient discussion and risk factor consideration  Pertinent  "mammograms are reviewed under the imaging tab.    History of abnormal Pap smear: NO - age 30-65 PAP every 5 years with negative HPV co-testing recommended  PAP / HPV Latest Ref Rng & Units 12/5/2018   PAP Negative for squamous intraepithelial lesion or malignancy. Negative for squamous intraepithelial lesion or malignancy  Electronically signed by Ana Singh CT (ASCP) on 12/13/2018 at  3:16 PM     HPV16 NEG Negative   HPV18 NEG Negative   HRHPV NEG Negative     Reviewed and updated as needed this visit by clinical staff    Allergies  Meds              Reviewed and updated as needed this visit by Provider   Tobacco  Allergies  Meds  Problems  Med Hx  Surg Hx  Fam Hx             Review of Systems   Constitutional: Negative for chills and fever.   HENT: Negative for congestion, ear pain, hearing loss and sore throat.    Eyes: Negative for pain and visual disturbance.   Respiratory: Negative for cough and shortness of breath.    Cardiovascular: Negative for chest pain, palpitations and peripheral edema.   Gastrointestinal: Negative for abdominal pain, constipation, diarrhea, heartburn, hematochezia and nausea.   Breasts:  Negative for tenderness, breast mass and discharge.   Genitourinary: Negative for dysuria, frequency, genital sores, hematuria, pelvic pain, urgency, vaginal bleeding and vaginal discharge.   Musculoskeletal: Positive for arthralgias. Negative for joint swelling and myalgias.   Skin: Negative for rash.   Neurological: Negative for dizziness, weakness, headaches and paresthesias.   Psychiatric/Behavioral: Negative for mood changes. The patient is not nervous/anxious.         OBJECTIVE:   /63 (BP Location: Left arm, Patient Position: Sitting, Cuff Size: Adult Regular)   Pulse 71   Temp 97.8  F (36.6  C) (Oral)   Resp 16   Ht 1.548 m (5' 0.95\")   Wt 66.8 kg (147 lb 3.2 oz)   SpO2 100%   BMI 27.86 kg/m    Physical Exam  GENERAL APPEARANCE: healthy, alert and no " distress  EYES: Eyes grossly normal to inspection, PERRL and conjunctivae and sclerae normal  HENT: ear canals and TM's normal, nose and mouth without ulcers or lesions, oropharynx clear and oral mucous membranes moist  NECK: no adenopathy, no asymmetry, masses, or scars and thyroid normal to palpation  RESP: lungs clear to auscultation - no rales, rhonchi or wheezes  BREAST: normal without masses, tenderness or nipple discharge and no palpable axillary masses or adenopathy  CV: regular rate and rhythm, normal S1 S2, no S3 or S4, no murmur, click or rub, no peripheral edema and peripheral pulses strong  ABDOMEN: soft, nontender, no hepatosplenomegaly, no masses and bowel sounds normal  MS: no musculoskeletal defects are noted and gait is age appropriate without ataxia  SKIN: no suspicious lesions or rashes  NEURO: Normal strength and tone, sensory exam grossly normal, mentation intact and speech normal  PSYCH: mentation appears normal and affect normal/bright    Diagnostic Test Results:  Labs reviewed in Epic  Results for orders placed or performed in visit on 11/17/22 (from the past 24 hour(s))   HEMOGLOBIN A1C   Result Value Ref Range    Hemoglobin A1C 6.5 (H) 0.0 - 5.6 %       ASSESSMENT/PLAN:   1. Routine general medical examination at a health care facility  Routine history and physical, updated in EMR.  Immunizations updated today.  Mammogram is up-to-date, will be due in December.  Pap smear is up-to-date also, will be due next year.  Colonoscopy is also up-to-date, will be due 12/2023.  Labs updated as below.  Plan repeat physical in 1 year.  - Lipid panel reflex to direct LDL Fasting  - Comprehensive metabolic panel (BMP + Alb, Alk Phos, ALT, AST, Total. Bili, TP)    2. Type 2 diabetes mellitus with hyperglycemia, with long-term current use of insulin (H)  A1c is within goal range on current medication regimen.  Patient to let me know if she experiences repetitive low blood sugars.  Other labs are pending.   "She has an appointment for her eye exam next month.  Plan next routine diabetic check in 6 months.  - Albumin Random Urine Quantitative with Creat Ratio; Future  - HEMOGLOBIN A1C  - Lipid panel reflex to direct LDL Fasting  - Comprehensive metabolic panel (BMP + Alb, Alk Phos, ALT, AST, Total. Bili, TP)    3. Hypothyroidism, unspecified type  Continues on levothyroxine.  TSH pending today.  - TSH    4. Mixed hyperlipidemia  Continues on simvastatin and tolerates this well.  Labs as below are pending.  - Lipid panel reflex to direct LDL Fasting  - Comprehensive metabolic panel (BMP + Alb, Alk Phos, ALT, AST, Total. Bili, TP)    5. Chronic right shoulder pain  Patient to return for x-ray as this was unavailable today.  We will attempt to see if there is significant bony degenerative changes in the shoulder.  Otherwise this appears most consistent with rotator cuff tendinopathy.  Depending on x-ray results, will offer steroid injection versus physical therapy.  We also discussed ergonomics at work.  - XR Shoulder Right G/E 3 Views; Future    6. Postmenopausal status  Patient would like a screening bone density test done.  This was ordered today.  - DX Hip/Pelvis/Spine; Future      Patient has been advised of split billing requirements and indicates understanding: Yes      COUNSELING:  Reviewed preventive health counseling, as reflected in patient instructions  Special attention given to:        Regular exercise       Healthy diet/nutrition       Immunizations    Vaccinated for: Pneumococcal and TDAP         Osteoporosis prevention/bone health       Colorectal Cancer Screening      BMI:   Estimated body mass index is 27.86 kg/m  as calculated from the following:    Height as of this encounter: 1.548 m (5' 0.95\").    Weight as of this encounter: 66.8 kg (147 lb 3.2 oz).   Weight management plan: Discussed healthy diet and exercise guidelines      She reports that she has never smoked. She has never used smokeless " tobacco.          Carol Chavez MD  [facility]

## 2022-11-18 ENCOUNTER — ANCILLARY PROCEDURE (OUTPATIENT)
Dept: GENERAL RADIOLOGY | Facility: CLINIC | Age: 62
End: 2022-11-18
Payer: COMMERCIAL

## 2022-11-18 DIAGNOSIS — G89.29 CHRONIC RIGHT SHOULDER PAIN: ICD-10-CM

## 2022-11-18 DIAGNOSIS — M25.511 CHRONIC RIGHT SHOULDER PAIN: ICD-10-CM

## 2022-11-18 PROCEDURE — 73030 X-RAY EXAM OF SHOULDER: CPT | Mod: TC | Performed by: RADIOLOGY

## 2022-12-08 ENCOUNTER — ANCILLARY PROCEDURE (OUTPATIENT)
Dept: BONE DENSITY | Facility: CLINIC | Age: 62
End: 2022-12-08
Attending: FAMILY MEDICINE
Payer: COMMERCIAL

## 2022-12-08 DIAGNOSIS — Z78.0 POSTMENOPAUSAL STATUS: ICD-10-CM

## 2022-12-08 PROCEDURE — 77080 DXA BONE DENSITY AXIAL: CPT | Mod: TC | Performed by: RADIOLOGY

## 2022-12-22 DIAGNOSIS — E03.9 HYPOTHYROIDISM, UNSPECIFIED TYPE: ICD-10-CM

## 2022-12-23 RX ORDER — LEVOTHYROXINE SODIUM 112 UG/1
TABLET ORAL
Qty: 90 TABLET | Refills: 3 | Status: SHIPPED | OUTPATIENT
Start: 2022-12-23 | End: 2023-11-22

## 2022-12-23 NOTE — TELEPHONE ENCOUNTER
"Last Written Prescription Date:  3/30/22  Last Fill Quantity: 90,  # refills: 2   Last office visit provider:  11/17/22     Requested Prescriptions   Pending Prescriptions Disp Refills     levothyroxine (SYNTHROID/LEVOTHROID) 112 MCG tablet [Pharmacy Med Name: L-THYROXINE (SYNTHROID) TABS 112MCG] 90 tablet 3     Sig: TAKE 1 TABLET DAILY AT 6:00 A.M.       Thyroid Protocol Passed - 12/22/2022 11:08 PM        Passed - Patient is 12 years or older        Passed - Recent (12 mo) or future (30 days) visit within the authorizing provider's specialty     Patient has had an office visit with the authorizing provider or a provider within the authorizing providers department within the previous 12 mos or has a future within next 30 days. See \"Patient Info\" tab in inbasket, or \"Choose Columns\" in Meds & Orders section of the refill encounter.              Passed - Medication is active on med list        Passed - Normal TSH on file in past 12 months     Recent Labs   Lab Test 11/17/22  0754   TSH 0.34              Passed - No active pregnancy on record     If patient is pregnant or has had a positive pregnancy test, please check TSH.          Passed - No positive pregnancy test in past 12 months     If patient is pregnant or has had a positive pregnancy test, please check TSH.               Danielle Veloz RN 12/23/22 3:29 PM  "

## 2022-12-26 DIAGNOSIS — Z79.4 TYPE 2 DIABETES MELLITUS WITH HYPERGLYCEMIA, WITH LONG-TERM CURRENT USE OF INSULIN (H): ICD-10-CM

## 2022-12-26 DIAGNOSIS — E11.65 TYPE 2 DIABETES MELLITUS WITH HYPERGLYCEMIA, WITH LONG-TERM CURRENT USE OF INSULIN (H): ICD-10-CM

## 2022-12-27 RX ORDER — SEMAGLUTIDE 1.34 MG/ML
INJECTION, SOLUTION SUBCUTANEOUS
Qty: 4.5 ML | Refills: 1 | Status: SHIPPED | OUTPATIENT
Start: 2022-12-27 | End: 2023-06-13

## 2022-12-27 NOTE — TELEPHONE ENCOUNTER
"Last Written Prescription Date:  10/4/22  Last Fill Quantity: 4.5ml,  # refills: 0   Last office visit provider:  11/17/22     Requested Prescriptions   Pending Prescriptions Disp Refills     OZEMPIC (0.25 OR 0.5 MG/DOSE) 2 MG/1.5ML SOPN pen [Pharmacy Med Name: OZEMPIC 0.25 OR 0.5 MG/DOSE PEN INJ 2MG/1.5ML] 4.5 mL 3     Sig: INJECT 0.5 MG UNDER THE SKIN EVERY 7 DAYS       GLP-1 Agonists Protocol Passed - 12/26/2022 11:09 PM        Passed - HgbA1C in past 3 or 6 months     If HgbA1C is 8 or greater, it needs to be on file within the past 3 months.  If less than 8, must be on file within the past 6 months.     Recent Labs   Lab Test 11/17/22  0754   A1C 6.5*             Passed - Medication is active on med list        Passed - Patient is age 18 or older        Passed - No active pregnancy on record        Passed - Normal serum creatinine on file in past 12 months     Recent Labs   Lab Test 11/17/22  0754   CR 0.79       Ok to refill medication if creatinine is low          Passed - No positive pregnancy test in past 12 months        Passed - Recent (6 mo) or future (30 days) visit within the authorizing provider's specialty     Patient had office visit in the last 6 months or has a visit in the next 30 days with authorizing provider.  See \"Patient Info\" tab in inbasket, or \"Choose Columns\" in Meds & Orders section of the refill encounter.                 Jacob Rey RN 12/27/22 4:15 PM  "

## 2022-12-28 ENCOUNTER — ANCILLARY PROCEDURE (OUTPATIENT)
Dept: MAMMOGRAPHY | Facility: CLINIC | Age: 62
End: 2022-12-28
Attending: FAMILY MEDICINE
Payer: COMMERCIAL

## 2022-12-28 DIAGNOSIS — Z12.31 VISIT FOR SCREENING MAMMOGRAM: ICD-10-CM

## 2022-12-28 PROCEDURE — 77067 SCR MAMMO BI INCL CAD: CPT

## 2023-01-02 ENCOUNTER — TRANSFERRED RECORDS (OUTPATIENT)
Dept: MULTI SPECIALTY CLINIC | Facility: CLINIC | Age: 63
End: 2023-01-02

## 2023-01-02 LAB — RETINOPATHY: NORMAL

## 2023-01-22 DIAGNOSIS — E11.9 TYPE 2 DIABETES MELLITUS WITHOUT COMPLICATION, WITHOUT LONG-TERM CURRENT USE OF INSULIN (H): ICD-10-CM

## 2023-01-23 RX ORDER — PEN NEEDLE, DIABETIC 32GX 5/32"
NEEDLE, DISPOSABLE MISCELLANEOUS
Qty: 100 EACH | Refills: 1 | Status: SHIPPED | OUTPATIENT
Start: 2023-01-23 | End: 2023-07-21

## 2023-01-23 NOTE — TELEPHONE ENCOUNTER
"Last Written Prescription Date:  10/24/22  Last Fill Quantity: 100,  # refills: 0   Last office visit provider:  11/17/22     Requested Prescriptions   Pending Prescriptions Disp Refills     BD GEORGIE U/F 32G X 4 MM insulin pen needle [Pharmacy Med Name: BD PEN EMILIO UF GEORGIE 4MM 90'S 32G5/32]  3     Sig: USE 1 PEN NEEDLE DAILY AS DIRECTED       Diabetic Supplies Protocol Passed - 1/23/2023  3:23 PM        Passed - Medication is active on med list        Passed - Patient is 18 years of age or older        Passed - Recent (6 mo) or future (30 days) visit within the authorizing provider's specialty     Patient had office visit in the last 6 months or has a visit in the next 30 days with authorizing provider.  See \"Patient Info\" tab in inbasket, or \"Choose Columns\" in Meds & Orders section of the refill encounter.                 Brooks Dvaid RN 01/23/23 3:23 PM  "

## 2023-02-09 DIAGNOSIS — E11.9 TYPE 2 DIABETES MELLITUS WITHOUT COMPLICATION, WITHOUT LONG-TERM CURRENT USE OF INSULIN (H): ICD-10-CM

## 2023-02-10 RX ORDER — EMPAGLIFLOZIN 25 MG/1
TABLET, FILM COATED ORAL
Qty: 90 TABLET | Refills: 0 | Status: SHIPPED | OUTPATIENT
Start: 2023-02-10 | End: 2023-05-11

## 2023-02-10 NOTE — TELEPHONE ENCOUNTER
"Last Written Prescription Date:  8/15/22  Last Fill Quantity: 90,  # refills: 1   Last office visit provider:  11/17/22     Requested Prescriptions   Pending Prescriptions Disp Refills     JARDIANCE 25 MG TABS tablet [Pharmacy Med Name: JARDIANCE TABS 25MG] 90 tablet 3     Sig: TAKE 1 TABLET DAILY       Sodium Glucose Co-Transport Inhibitor Agents Passed - 2/9/2023 11:08 PM        Passed - Patient has documented A1c within the specified period of time.     If HgbA1C is 8 or greater, it needs to be on file within the past 3 months.  If less than 8, must be on file within the past 6 months.     Recent Labs   Lab Test 11/17/22  0754   A1C 6.5*             Passed - No creatinine >1.4 or GFR <45 within the past 12 mos     Recent Labs   Lab Test 11/17/22  0754 12/01/21  0707 10/05/20  0800   GFRESTIMATED 85   < > >60   GFRESTBLACK  --   --  >60    < > = values in this interval not displayed.       Recent Labs   Lab Test 11/17/22  0754   CR 0.79             Passed - Medication is active on med list        Passed - Patient is age 18 or older        Passed - Patient is not pregnant        Passed - Patient has documented normal Potassium within the last 12 mos.     Recent Labs   Lab Test 11/17/22  0754   POTASSIUM 3.9             Passed - Patient has no positive pregnancy test within the past 12 mos.        Passed - Recent (6 mo) or future (30 days) visit within the authorizing provider's specialty     Patient had office visit in the last 6 months or has a visit in the next 30 days with authorizing provider or within the authorizing provider's specialty.  See \"Patient Info\" tab in inbasket, or \"Choose Columns\" in Meds & Orders section of the refill encounter.                 Danielle Veloz RN 02/10/23 1:27 PM  "

## 2023-05-10 DIAGNOSIS — E11.9 TYPE 2 DIABETES MELLITUS WITHOUT COMPLICATION, WITHOUT LONG-TERM CURRENT USE OF INSULIN (H): ICD-10-CM

## 2023-05-11 RX ORDER — EMPAGLIFLOZIN 25 MG/1
TABLET, FILM COATED ORAL
Qty: 90 TABLET | Refills: 0 | Status: SHIPPED | OUTPATIENT
Start: 2023-05-11 | End: 2023-08-09

## 2023-05-11 NOTE — TELEPHONE ENCOUNTER
"Last Written Prescription Date:  2/10/2023  Last Fill Quantity: 90,  # refills: 0   Last office visit provider:  11/17/2022     Requested Prescriptions   Pending Prescriptions Disp Refills     JARDIANCE 25 MG TABS tablet [Pharmacy Med Name: JARDIANCE TABS 25MG] 90 tablet 3     Sig: TAKE 1 TABLET DAILY       Sodium Glucose Co-Transport Inhibitor Agents Passed - 5/10/2023 11:09 PM        Passed - Patient has documented A1c within the specified period of time.     If HgbA1C is 8 or greater, it needs to be on file within the past 3 months.  If less than 8, must be on file within the past 6 months.     Recent Labs   Lab Test 11/17/22  0754   A1C 6.5*             Passed - No creatinine >1.4 or GFR <45 within the past 12 mos     Recent Labs   Lab Test 11/17/22  0754 12/01/21  0707 10/05/20  0800   GFRESTIMATED 85   < > >60   GFRESTBLACK  --   --  >60    < > = values in this interval not displayed.       Recent Labs   Lab Test 11/17/22  0754   CR 0.79             Passed - Medication is active on med list        Passed - Patient is age 18 or older        Passed - Patient is not pregnant        Passed - Patient has documented normal Potassium within the last 12 mos.     Recent Labs   Lab Test 11/17/22  0754   POTASSIUM 3.9             Passed - Patient has no positive pregnancy test within the past 12 mos.        Passed - Recent (6 mo) or future (30 days) visit within the authorizing provider's specialty     Patient had office visit in the last 6 months or has a visit in the next 30 days with authorizing provider or within the authorizing provider's specialty.  See \"Patient Info\" tab in inbasket, or \"Choose Columns\" in Meds & Orders section of the refill encounter.                 Mita Justice RN 05/11/23 2:28 PM  "

## 2023-05-20 ENCOUNTER — HEALTH MAINTENANCE LETTER (OUTPATIENT)
Age: 63
End: 2023-05-20

## 2023-06-07 DIAGNOSIS — E11.65 TYPE 2 DIABETES MELLITUS WITH HYPERGLYCEMIA, WITH LONG-TERM CURRENT USE OF INSULIN (H): ICD-10-CM

## 2023-06-07 DIAGNOSIS — Z79.4 TYPE 2 DIABETES MELLITUS WITH HYPERGLYCEMIA, WITH LONG-TERM CURRENT USE OF INSULIN (H): ICD-10-CM

## 2023-06-08 NOTE — TELEPHONE ENCOUNTER
"Routing refill request to provider for review/approval because:  Labs not current:  Hgb A1C  Patient needs to be seen because:  Has not been seen in the past 6 months    Last Written Prescription Date:  9/19/2022  Last Fill Quantity: 15 ml,  # refills: 0   Last office visit provider:  11/17/2022     Requested Prescriptions   Pending Prescriptions Disp Refills     TRESIBA FLEXTOUCH 100 UNIT/ML pen [Pharmacy Med Name: TRESIBA FLEXTOUCH PEN 3ML 5'S 100U/ML] 45 mL 3     Sig: INJECT 14 UNITS UNDER THE SKIN DAILY       Long Acting Insulin Protocol Failed - 6/8/2023  1:31 PM        Failed - HgbA1C in past 3 or 6 months     If HgbA1C is 8 or greater, it needs to be on file within the past 3 months.  If less than 8, must be on file within the past 6 months.     Recent Labs   Lab Test 11/17/22  0754   A1C 6.5*             Failed - Recent (6 mo) or future (30 days) visit within the authorizing provider's specialty     Patient had office visit in the last 6 months or has a visit in the next 30 days with authorizing provider or within the authorizing provider's specialty.  See \"Patient Info\" tab in inbasket, or \"Choose Columns\" in Meds & Orders section of the refill encounter.            Passed - Serum creatinine on file in past 12 months     Recent Labs   Lab Test 11/17/22  0754   CR 0.79       Ok to refill medication if creatinine is low          Passed - Medication is active on med list        Passed - Patient is age 18 or older             KRISTINE FLOWERS RN 06/08/23 1:32 PM  "

## 2023-06-09 RX ORDER — INSULIN DEGLUDEC 100 U/ML
INJECTION, SOLUTION SUBCUTANEOUS
Qty: 45 ML | Refills: 3 | Status: SHIPPED | OUTPATIENT
Start: 2023-06-09 | End: 2024-06-04

## 2023-06-12 DIAGNOSIS — E11.65 TYPE 2 DIABETES MELLITUS WITH HYPERGLYCEMIA, WITH LONG-TERM CURRENT USE OF INSULIN (H): ICD-10-CM

## 2023-06-12 DIAGNOSIS — Z79.4 TYPE 2 DIABETES MELLITUS WITH HYPERGLYCEMIA, WITH LONG-TERM CURRENT USE OF INSULIN (H): ICD-10-CM

## 2023-06-13 RX ORDER — SEMAGLUTIDE 1.34 MG/ML
INJECTION, SOLUTION SUBCUTANEOUS
Qty: 4.5 ML | Refills: 3 | Status: SHIPPED | OUTPATIENT
Start: 2023-06-13 | End: 2024-04-29

## 2023-06-13 NOTE — TELEPHONE ENCOUNTER
"Routing refill request to provider for review/approval because:  Labs not current:  A1c  Patient needs to be seen    Last Written Prescription Date:  12/27/2022  Last Fill Quantity: 4.5ml,  # refills: 1   Last office visit provider:  11/17/2022     Requested Prescriptions   Pending Prescriptions Disp Refills     OZEMPIC (0.25 OR 0.5 MG/DOSE) 2 MG/1.5ML SOPN pen [Pharmacy Med Name: OZEMPIC 0.25 OR 0.5 MG/DOSE PEN INJ 2MG/1.5ML] 4.5 mL 3     Sig: INJECT 0.5 MG UNDER THE SKIN EVERY 7 DAYS       GLP-1 Agonists Protocol Failed - 6/12/2023 11:09 PM        Failed - HgbA1C in past 3 or 6 months     If HgbA1C is 8 or greater, it needs to be on file within the past 3 months.  If less than 8, must be on file within the past 6 months.     Recent Labs   Lab Test 11/17/22  0754   A1C 6.5*             Failed - Recent (6 mo) or future (30 days) visit within the authorizing provider's specialty     Patient had office visit in the last 6 months or has a visit in the next 30 days with authorizing provider.  See \"Patient Info\" tab in inbasket, or \"Choose Columns\" in Meds & Orders section of the refill encounter.            Passed - Medication is active on med list        Passed - Patient is age 18 or older        Passed - No active pregnancy on record        Passed - Normal serum creatinine on file in past 12 months     Recent Labs   Lab Test 11/17/22  0754   CR 0.79       Ok to refill medication if creatinine is low          Passed - No positive pregnancy test in past 12 months             Irlanda Moore RN 06/12/23 11:32 PM  "

## 2023-08-08 DIAGNOSIS — E11.9 TYPE 2 DIABETES MELLITUS WITHOUT COMPLICATION, WITHOUT LONG-TERM CURRENT USE OF INSULIN (H): ICD-10-CM

## 2023-08-09 RX ORDER — EMPAGLIFLOZIN 25 MG/1
TABLET, FILM COATED ORAL
Qty: 90 TABLET | Refills: 3 | Status: SHIPPED | OUTPATIENT
Start: 2023-08-09 | End: 2024-07-31

## 2023-08-09 NOTE — TELEPHONE ENCOUNTER
"Routing refill request to provider for review/approval because:  Labs not current:  A1c    Last Written Prescription Date:  5/11/2023  Last Fill Quantity: 90,  # refills: 0   Last office visit provider:  11/17/2022     Requested Prescriptions   Pending Prescriptions Disp Refills    JARDIANCE 25 MG TABS tablet [Pharmacy Med Name: JARDIANCE TABS 25MG] 90 tablet 3     Sig: TAKE 1 TABLET DAILY       Sodium Glucose Co-Transport Inhibitor Agents Failed - 8/8/2023 11:08 PM        Failed - Patient has documented A1c within the specified period of time.     If HgbA1C is 8 or greater, it needs to be on file within the past 3 months.  If less than 8, must be on file within the past 6 months.     Recent Labs   Lab Test 11/17/22  0754   A1C 6.5*             Passed - No creatinine >1.4 or GFR <45 within the past 12 mos     Recent Labs   Lab Test 11/17/22  0754 12/01/21  0707 10/05/20  0800   GFRESTIMATED 85   < > >60   GFRESTBLACK  --   --  >60    < > = values in this interval not displayed.       Recent Labs   Lab Test 11/17/22  0754   CR 0.79             Passed - Medication is active on med list        Passed - Patient is age 18 or older        Passed - Patient is not pregnant        Passed - Patient has documented normal Potassium within the last 12 mos.     Recent Labs   Lab Test 11/17/22  0754   POTASSIUM 3.9             Passed - Patient has no positive pregnancy test within the past 12 mos.        Passed - Recent (6 mo) or future (30 days) visit within the authorizing provider's specialty     Patient had office visit in the last 6 months or has a visit in the next 30 days with authorizing provider or within the authorizing provider's specialty.  See \"Patient Info\" tab in inbasket, or \"Choose Columns\" in Meds & Orders section of the refill encounter.                 Irlanda Moore RN 08/08/23 11:48 PM  "

## 2023-08-10 ENCOUNTER — OFFICE VISIT (OUTPATIENT)
Dept: FAMILY MEDICINE | Facility: CLINIC | Age: 63
End: 2023-08-10
Payer: COMMERCIAL

## 2023-08-10 VITALS
OXYGEN SATURATION: 97 % | TEMPERATURE: 98.5 F | BODY MASS INDEX: 28.32 KG/M2 | RESPIRATION RATE: 16 BRPM | WEIGHT: 150 LBS | HEART RATE: 79 BPM | SYSTOLIC BLOOD PRESSURE: 112 MMHG | DIASTOLIC BLOOD PRESSURE: 63 MMHG | HEIGHT: 61 IN

## 2023-08-10 DIAGNOSIS — Z79.4 TYPE 2 DIABETES MELLITUS WITH HYPERGLYCEMIA, WITH LONG-TERM CURRENT USE OF INSULIN (H): Primary | ICD-10-CM

## 2023-08-10 DIAGNOSIS — E78.2 MIXED HYPERLIPIDEMIA: ICD-10-CM

## 2023-08-10 DIAGNOSIS — E11.65 TYPE 2 DIABETES MELLITUS WITH HYPERGLYCEMIA, WITH LONG-TERM CURRENT USE OF INSULIN (H): Primary | ICD-10-CM

## 2023-08-10 LAB — HBA1C MFR BLD: 6.8 % (ref 0–5.6)

## 2023-08-10 PROCEDURE — 36415 COLL VENOUS BLD VENIPUNCTURE: CPT | Performed by: FAMILY MEDICINE

## 2023-08-10 PROCEDURE — 99213 OFFICE O/P EST LOW 20 MIN: CPT | Performed by: FAMILY MEDICINE

## 2023-08-10 PROCEDURE — 83036 HEMOGLOBIN GLYCOSYLATED A1C: CPT | Performed by: FAMILY MEDICINE

## 2023-08-10 NOTE — PROGRESS NOTES
"  Assessment & Plan     Type 2 diabetes mellitus with hyperglycemia, with long-term current use of insulin (H)  Remains well-controlled on current regimen of Jardiance, maximum dose metformin, Ozempic and Tresiba.  Patient recently increased her Tresiba dose slightly.  Morning fasting sugars have been within goal range.  - HEMOGLOBIN A1C; Future    Mixed hyperlipidemia  Previously well-controlled on current dose of simvastatin.  LDL bounces right around 70.  Plan recheck at her upcoming physical in 3 months.             BMI:   Estimated body mass index is 28.39 kg/m  as calculated from the following:    Height as of this encounter: 1.548 m (5' 0.95\").    Weight as of this encounter: 68 kg (150 lb).   Weight management plan: Discussed healthy diet and exercise guidelines        Carol Chavez MD  [facility]    Ita Patterson is a 62 year old, presenting for the following health issues:  Diabetes        8/10/2023     6:54 AM   Additional Questions   Roomed by May PATTERSON LPN       History of Present Illness       Diabetes:   She presents for follow up of diabetes.  She is checking home blood glucose one time daily.   She checks blood glucose before meals.  Blood glucose is never over 200 and never under 70. She is aware of hypoglycemia symptoms including shakiness.   She is concerned about other.    She is not experiencing numbness or burning in feet, excessive thirst, blurry vision, weight changes or redness, sores or blisters on feet. The patient has had a diabetic eye exam in the last 12 months. Eye exam performed on [id number]. Location of last eye exam [facility].    She exercises with enough effort to increase her heart rate 20 to 29 minutes per day.  She exercises with enough effort to increase her heart rate 3 or less days per week.   She is taking medications regularly.     Patient presents today for a diabetes check up.  She is otherwise feeling well and has no other " "questions or concerns today.  She is due for a recheck of her A1c, previously was well-controlled.  She says that her blood sugars have been \"okay\".  In June and early July, they were creeping up into the 130's and 140's, and so in mid-July, she bumped up to 16 units of Tresiba.  She has been walking for exercise, admits that her diet could be better.  She never checks post-meal sugars.      Review of Systems   Constitutional, HEENT, cardiovascular, pulmonary, gi and gu systems are negative, except as otherwise noted.      Objective    /63   Pulse 79   Temp 98.5  F (36.9  C) (Oral)   Resp 16   Ht 1.548 m (5' 0.95\")   Wt 68 kg (150 lb)   SpO2 97%   BMI 28.39 kg/m    Body mass index is 28.39 kg/m .  Physical Exam   GENERAL: healthy, alert and no distress  RESP: lungs clear to auscultation - no rales, rhonchi or wheezes  CV: regular rate and rhythm, normal S1 S2, no S3 or S4, no murmur, click or rub, no peripheral edema and peripheral pulses strong  MS: no gross musculoskeletal defects noted, no edema  NEURO: Normal strength and tone, mentation intact and speech normal  PSYCH: mentation appears normal, affect normal/bright  Diabetic foot exam: normal DP and PT pulses, no trophic changes or ulcerative lesions, normal sensory exam, and normal monofilament exam    Results for orders placed or performed in visit on 08/10/23   HEMOGLOBIN A1C     Status: Abnormal   Result Value Ref Range    Hemoglobin A1C 6.8 (H) 0.0 - 5.6 %                     "

## 2023-09-07 ENCOUNTER — TRANSFERRED RECORDS (OUTPATIENT)
Dept: HEALTH INFORMATION MANAGEMENT | Facility: CLINIC | Age: 63
End: 2023-09-07
Payer: COMMERCIAL

## 2023-09-07 LAB — RETINOPATHY: POSITIVE

## 2023-10-15 DIAGNOSIS — E11.9 TYPE 2 DIABETES MELLITUS WITHOUT COMPLICATION, WITHOUT LONG-TERM CURRENT USE OF INSULIN (H): ICD-10-CM

## 2023-10-18 RX ORDER — BLOOD SUGAR DIAGNOSTIC
STRIP MISCELLANEOUS
Qty: 300 STRIP | Refills: 0 | Status: SHIPPED | OUTPATIENT
Start: 2023-10-18 | End: 2024-01-15

## 2023-11-06 DIAGNOSIS — E11.9 TYPE 2 DIABETES MELLITUS WITHOUT COMPLICATION, WITHOUT LONG-TERM CURRENT USE OF INSULIN (H): ICD-10-CM

## 2023-11-06 DIAGNOSIS — E78.5 HYPERLIPIDEMIA: ICD-10-CM

## 2023-11-07 RX ORDER — SIMVASTATIN 40 MG
TABLET ORAL
Qty: 90 TABLET | Refills: 0 | Status: SHIPPED | OUTPATIENT
Start: 2023-11-07 | End: 2024-02-02

## 2023-11-14 ASSESSMENT — ENCOUNTER SYMPTOMS
DIARRHEA: 0
FEVER: 0
EYE PAIN: 0
HEMATURIA: 0
PALPITATIONS: 0
ABDOMINAL PAIN: 0
CONSTIPATION: 0
MYALGIAS: 0
HEARTBURN: 0
COUGH: 0
DIZZINESS: 0
WEAKNESS: 0
NERVOUS/ANXIOUS: 0
BREAST MASS: 0
DYSURIA: 0
NAUSEA: 0
ARTHRALGIAS: 1
HEADACHES: 0
SORE THROAT: 0
PARESTHESIAS: 0
HEMATOCHEZIA: 0
SHORTNESS OF BREATH: 0
FREQUENCY: 0
CHILLS: 0
JOINT SWELLING: 0

## 2023-11-21 ENCOUNTER — OFFICE VISIT (OUTPATIENT)
Dept: FAMILY MEDICINE | Facility: CLINIC | Age: 63
End: 2023-11-21
Payer: COMMERCIAL

## 2023-11-21 VITALS
HEIGHT: 61 IN | WEIGHT: 153.8 LBS | DIASTOLIC BLOOD PRESSURE: 60 MMHG | SYSTOLIC BLOOD PRESSURE: 115 MMHG | RESPIRATION RATE: 16 BRPM | BODY MASS INDEX: 29.04 KG/M2 | HEART RATE: 68 BPM | OXYGEN SATURATION: 100 % | TEMPERATURE: 98.2 F

## 2023-11-21 DIAGNOSIS — E11.65 TYPE 2 DIABETES MELLITUS WITH HYPERGLYCEMIA, WITH LONG-TERM CURRENT USE OF INSULIN (H): ICD-10-CM

## 2023-11-21 DIAGNOSIS — Z12.11 SCREEN FOR COLON CANCER: ICD-10-CM

## 2023-11-21 DIAGNOSIS — Z00.00 ROUTINE GENERAL MEDICAL EXAMINATION AT A HEALTH CARE FACILITY: Primary | ICD-10-CM

## 2023-11-21 DIAGNOSIS — Z12.4 CERVICAL CANCER SCREENING: ICD-10-CM

## 2023-11-21 DIAGNOSIS — E03.9 HYPOTHYROIDISM, UNSPECIFIED TYPE: ICD-10-CM

## 2023-11-21 DIAGNOSIS — M25.511 CHRONIC RIGHT SHOULDER PAIN: ICD-10-CM

## 2023-11-21 DIAGNOSIS — Z79.4 TYPE 2 DIABETES MELLITUS WITH HYPERGLYCEMIA, WITH LONG-TERM CURRENT USE OF INSULIN (H): ICD-10-CM

## 2023-11-21 DIAGNOSIS — G89.29 CHRONIC RIGHT SHOULDER PAIN: ICD-10-CM

## 2023-11-21 DIAGNOSIS — E78.2 MIXED HYPERLIPIDEMIA: ICD-10-CM

## 2023-11-21 LAB
ERYTHROCYTE [DISTWIDTH] IN BLOOD BY AUTOMATED COUNT: 15.6 % (ref 10–15)
HCT VFR BLD AUTO: 37.7 % (ref 35–47)
HGB BLD-MCNC: 11.6 G/DL (ref 11.7–15.7)
MCH RBC QN AUTO: 24.7 PG (ref 26.5–33)
MCHC RBC AUTO-ENTMCNC: 30.8 G/DL (ref 31.5–36.5)
MCV RBC AUTO: 80 FL (ref 78–100)
PLATELET # BLD AUTO: 236 10E3/UL (ref 150–450)
RBC # BLD AUTO: 4.69 10E6/UL (ref 3.8–5.2)
WBC # BLD AUTO: 5.4 10E3/UL (ref 4–11)

## 2023-11-21 PROCEDURE — 91320 SARSCV2 VAC 30MCG TRS-SUC IM: CPT | Performed by: FAMILY MEDICINE

## 2023-11-21 PROCEDURE — 84443 ASSAY THYROID STIM HORMONE: CPT | Performed by: FAMILY MEDICINE

## 2023-11-21 PROCEDURE — 87624 HPV HI-RISK TYP POOLED RSLT: CPT | Performed by: FAMILY MEDICINE

## 2023-11-21 PROCEDURE — 99214 OFFICE O/P EST MOD 30 MIN: CPT | Mod: 25 | Performed by: FAMILY MEDICINE

## 2023-11-21 PROCEDURE — 90682 RIV4 VACC RECOMBINANT DNA IM: CPT | Performed by: FAMILY MEDICINE

## 2023-11-21 PROCEDURE — 90471 IMMUNIZATION ADMIN: CPT | Performed by: FAMILY MEDICINE

## 2023-11-21 PROCEDURE — 36415 COLL VENOUS BLD VENIPUNCTURE: CPT | Performed by: FAMILY MEDICINE

## 2023-11-21 PROCEDURE — 99396 PREV VISIT EST AGE 40-64: CPT | Mod: 25 | Performed by: FAMILY MEDICINE

## 2023-11-21 PROCEDURE — 80061 LIPID PANEL: CPT | Performed by: FAMILY MEDICINE

## 2023-11-21 PROCEDURE — 83036 HEMOGLOBIN GLYCOSYLATED A1C: CPT | Performed by: FAMILY MEDICINE

## 2023-11-21 PROCEDURE — 90480 ADMN SARSCOV2 VAC 1/ONLY CMP: CPT | Performed by: FAMILY MEDICINE

## 2023-11-21 PROCEDURE — 82043 UR ALBUMIN QUANTITATIVE: CPT | Performed by: FAMILY MEDICINE

## 2023-11-21 PROCEDURE — 80053 COMPREHEN METABOLIC PANEL: CPT | Performed by: FAMILY MEDICINE

## 2023-11-21 PROCEDURE — G0145 SCR C/V CYTO,THINLAYER,RESCR: HCPCS | Performed by: FAMILY MEDICINE

## 2023-11-21 PROCEDURE — 84439 ASSAY OF FREE THYROXINE: CPT | Performed by: FAMILY MEDICINE

## 2023-11-21 PROCEDURE — 85027 COMPLETE CBC AUTOMATED: CPT | Performed by: FAMILY MEDICINE

## 2023-11-21 PROCEDURE — 82570 ASSAY OF URINE CREATININE: CPT | Performed by: FAMILY MEDICINE

## 2023-11-21 ASSESSMENT — ENCOUNTER SYMPTOMS
EYE PAIN: 0
ARTHRALGIAS: 1
NERVOUS/ANXIOUS: 0
DIZZINESS: 0
COUGH: 0
CONSTIPATION: 0
SORE THROAT: 0
NAUSEA: 0
MYALGIAS: 0
HEMATOCHEZIA: 0
ABDOMINAL PAIN: 0
FEVER: 0
WEAKNESS: 0
BREAST MASS: 0
DYSURIA: 0
DIARRHEA: 0
FREQUENCY: 0
JOINT SWELLING: 0
PARESTHESIAS: 0
HEARTBURN: 0
SHORTNESS OF BREATH: 0
HEADACHES: 0
HEMATURIA: 0
PALPITATIONS: 0
CHILLS: 0

## 2023-11-21 NOTE — PROGRESS NOTES
SUBJECTIVE:   Yesenia is a 63 year old, presenting for the following:  Physical        11/21/2023     3:45 PM   Additional Questions   Roomed by May PATTERSON LPN       Healthy Habits:     Getting at least 3 servings of Calcium per day:  Yes    Bi-annual eye exam:  Yes    Dental care twice a year:  Yes    Sleep apnea or symptoms of sleep apnea:  None    Diet:  Regular (no restrictions)    Frequency of exercise:  2-3 days/week    Duration of exercise:  15-30 minutes    Taking medications regularly:  Yes    Medication side effects:  No significant flushing      PROBLEMS TO ADD ON...  No specific questions or concerns today.  Right shoulder has been feeling a bit better of late, still some discomfort with raising arm above her head.      Social History     Tobacco Use    Smoking status: Never    Smokeless tobacco: Never   Substance Use Topics    Alcohol use: Yes     Alcohol/week: 0.0 - 6.0 standard drinks of alcohol     Comment: rare           11/14/2023     9:53 AM   Alcohol Use   Prescreen: >3 drinks/day or >7 drinks/week? No     Reviewed orders with patient.  Reviewed health maintenance and updated orders accordingly - Yes    BP Readings from Last 3 Encounters:   11/21/23 115/60   08/10/23 112/63   11/17/22 116/63    Wt Readings from Last 3 Encounters:   11/21/23 69.8 kg (153 lb 12.8 oz)   08/10/23 68 kg (150 lb)   11/17/22 66.8 kg (147 lb 3.2 oz)                  Patient Active Problem List   Diagnosis    Hypothyroidism    Mixed hyperlipidemia    Type 2 diabetes mellitus with hyperglycemia, with long-term current use of insulin (H)    Diverticular disease of colon    Chronic right shoulder pain     Past Surgical History:   Procedure Laterality Date    ANKLE FRACTURE SURGERY Right 2005    COLONOSCOPY  2013    HC CORRECT BUNION,DOUBLE OSTEOTOMY      Description: Hallux Valgus (Bunion) Correction;  Recorded: 10/29/2013;  Comments: age 13    HC REVISE MEDIAN N/CARPAL TUNNEL SURG      Description: Neuroplasty Decompression  Median Nerve At Carpal Tunnel;  Recorded: 10/26/2010;    SOFT TISSUE SURGERY  trigger finger and carpal tunnel       Social History     Tobacco Use    Smoking status: Never    Smokeless tobacco: Never   Substance Use Topics    Alcohol use: Not Currently     Comment: occasional     Family History   Problem Relation Age of Onset    Cancer Mother         lymphoma    Cerebrovascular Disease Mother 69.00    Hyperlipidemia Mother     Osteoporosis Mother     Heart Disease Father         bypass at 55    Hyperlipidemia Father     Heart Disease Brother         CAD s/p stents    Diabetes Brother     Coronary Artery Disease Brother     Cancer Maternal Grandmother         cervical    Heart Disease Paternal Grandmother     Breast Cancer No family hx of     Colon Cancer No family hx of     Diabetes No family hx of          Current Outpatient Medications   Medication Sig Dispense Refill    BD GEORGIE U/F 32G X 4 MM insulin pen needle USE 1 PEN NEEDLE DAILY AS DIRECTED 90 each 3    blood glucose (ONETOUCH ULTRA) test strip USE TO TEST BLOOD SUGAR THREE TIMES A DAY OR AS DIRECTED 300 strip 0    COQ10, UBIQUINOL, ORAL [COQ10, UBIQUINOL, ORAL] Take by mouth.      JARDIANCE 25 MG TABS tablet TAKE 1 TABLET DAILY 90 tablet 3    levothyroxine (SYNTHROID/LEVOTHROID) 112 MCG tablet TAKE 1 TABLET DAILY AT 6:00 A.M. 90 tablet 3    metFORMIN (GLUCOPHAGE) 1000 MG tablet TAKE 1 TABLET TWICE A DAY WITH MEALS 180 tablet 0    ONETOUCH ULTRA BLUE TEST STRIP strips [ONETOUCH ULTRA BLUE TEST STRIP STRIPS] USE 1 STRIP AS DIRECTED THREE TIMES A  strip 3    OZEMPIC, 0.25 OR 0.5 MG/DOSE, 2 MG/1.5ML SOPN pen INJECT 0.5 MG UNDER THE SKIN EVERY 7 DAYS 4.5 mL 3    simvastatin (ZOCOR) 40 MG tablet TAKE 1 TABLET AT BEDTIME 90 tablet 0    TRESIBA FLEXTOUCH 100 UNIT/ML pen INJECT 14 UNITS UNDER THE SKIN DAILY (Patient taking differently: Inject 16 Units Subcutaneous at bedtime) 45 mL 3     No Known Allergies    Breast Cancer Screenin/22/2021     10:16 PM 2021    11:51 AM   Breast CA Risk Assessment (FHS-7)   Do you have a family history of breast, colon, or ovarian cancer? No / Unknown No / Unknown         Mammogram Screening: Recommended mammography every 1-2 years with patient discussion and risk factor consideration  Pertinent mammograms are reviewed under the imaging tab.    History of abnormal Pap smear: NO - age 30-65 PAP every 5 years with negative HPV co-testing recommended      Latest Ref Rng & Units 2018     4:57 PM   PAP / HPV   PAP Negative for squamous intraepithelial lesion or malignancy. Negative for squamous intraepithelial lesion or malignancy  Electronically signed by Ana Singh CT (ASCP) on 2018 at  3:16 PM      HPV 16 DNA NEG Negative    HPV 18 DNA NEG Negative    Other HR HPV NEG Negative      Reviewed and updated as needed this visit by clinical staff   Tobacco  Allergies  Meds              Reviewed and updated as needed this visit by Provider   Tobacco  Allergies  Meds   Med Hx  Surg Hx  Fam Hx  Soc Hx         Past Medical History:   Diagnosis Date    Diabetes (H)     Thyroid disease       Past Surgical History:   Procedure Laterality Date    ANKLE FRACTURE SURGERY Right     COLONOSCOPY      HC CORRECT BUNION,DOUBLE OSTEOTOMY      Description: Hallux Valgus (Bunion) Correction;  Recorded: 10/29/2013;  Comments: age 13    HC REVISE MEDIAN N/CARPAL TUNNEL SURG      Description: Neuroplasty Decompression Median Nerve At Carpal Tunnel;  Recorded: 10/26/2010;    SOFT TISSUE SURGERY  trigger finger and carpal tunnel     OB History    Para Term  AB Living   2 2 2 0 0 0   SAB IAB Ectopic Multiple Live Births   0 0 0 0 0      # Outcome Date GA Lbr Kendrick/2nd Weight Sex Delivery Anes PTL Lv   2 Term            1 Term                Review of Systems   Constitutional:  Negative for chills and fever.   HENT:  Negative for congestion, ear pain, hearing loss and sore throat.    Eyes:   "Negative for pain and visual disturbance.   Respiratory:  Negative for cough and shortness of breath.    Cardiovascular:  Negative for chest pain, palpitations and peripheral edema.   Gastrointestinal:  Negative for abdominal pain, constipation, diarrhea, heartburn, hematochezia and nausea.   Breasts:  Negative for tenderness, breast mass and discharge.   Genitourinary:  Negative for dysuria, frequency, genital sores, hematuria, pelvic pain, urgency, vaginal bleeding and vaginal discharge.   Musculoskeletal:  Positive for arthralgias. Negative for joint swelling and myalgias.   Skin:  Negative for rash.   Neurological:  Negative for dizziness, weakness, headaches and paresthesias.   Psychiatric/Behavioral:  Negative for mood changes. The patient is not nervous/anxious.         OBJECTIVE:   /60   Pulse 68   Temp 98.2  F (36.8  C) (Oral)   Resp 16   Ht 1.549 m (5' 1\")   Wt 69.8 kg (153 lb 12.8 oz)   SpO2 100%   BMI 29.06 kg/m    Physical Exam  GENERAL APPEARANCE: healthy, alert and no distress  EYES: Eyes grossly normal to inspection, PERRL and conjunctivae and sclerae normal  HENT: ear canals and TM's normal, nose and mouth without ulcers or lesions, oropharynx clear and oral mucous membranes moist  NECK: no adenopathy, no asymmetry, masses, or scars and thyroid normal to palpation  RESP: lungs clear to auscultation - no rales, rhonchi or wheezes  BREAST: normal without masses, tenderness or nipple discharge and no palpable axillary masses or adenopathy  CV: regular rate and rhythm, normal S1 S2, no S3 or S4, no murmur, click or rub, no peripheral edema and peripheral pulses strong  ABDOMEN: soft, nontender, no hepatosplenomegaly, no masses and bowel sounds normal   (female): normal female external genitalia, normal urethral meatus, vaginal mucosal atrophy noted, normal cervix, adnexae, and uterus without masses or abnormal discharge  MS: no musculoskeletal defects are noted and gait is age appropriate " without ataxia  SKIN: no suspicious lesions or rashes  NEURO: Normal strength and tone, sensory exam grossly normal, mentation intact and speech normal  PSYCH: mentation appears normal and affect normal/bright    Diagnostic Test Results:  Labs reviewed in Epic  Results for orders placed or performed in visit on 11/21/23   Lipid panel reflex to direct LDL Non-fasting     Status: Abnormal   Result Value Ref Range    Cholesterol 140 <200 mg/dL    Triglycerides 193 (H) <150 mg/dL    Direct Measure HDL 53 >=50 mg/dL    LDL Cholesterol Calculated 48 <=100 mg/dL    Non HDL Cholesterol 87 <130 mg/dL    Narrative    Cholesterol  Desirable:  <200 mg/dL    Triglycerides  Normal:  Less than 150 mg/dL  Borderline High:  150-199 mg/dL  High:  200-499 mg/dL  Very High:  Greater than or equal to 500 mg/dL    Direct Measure HDL  Female:  Greater than or equal to 50 mg/dL   Male:  Greater than or equal to 40 mg/dL    LDL Cholesterol  Desirable:  <100mg/dL  Above Desirable:  100-129 mg/dL   Borderline High:  130-159 mg/dL   High:  160-189 mg/dL   Very High:  >= 190 mg/dL    Non HDL Cholesterol  Desirable:  130 mg/dL  Above Desirable:  130-159 mg/dL  Borderline High:  160-189 mg/dL  High:  190-219 mg/dL  Very High:  Greater than or equal to 220 mg/dL   Albumin Random Urine Quantitative with Creat Ratio     Status: None   Result Value Ref Range    Creatinine Urine mg/dL 59.6 mg/dL    Albumin Urine mg/L <12.0 mg/L    Albumin Urine mg/g Cr     TSH WITH FREE T4 REFLEX     Status: Abnormal   Result Value Ref Range    TSH 0.12 (L) 0.30 - 4.20 uIU/mL   Comprehensive metabolic panel (BMP + Alb, Alk Phos, ALT, AST, Total. Bili, TP)     Status: Abnormal   Result Value Ref Range    Sodium 139 135 - 145 mmol/L    Potassium 4.1 3.4 - 5.3 mmol/L    Carbon Dioxide (CO2) 22 22 - 29 mmol/L    Anion Gap 12 7 - 15 mmol/L    Urea Nitrogen 18.5 8.0 - 23.0 mg/dL    Creatinine 0.81 0.51 - 0.95 mg/dL    GFR Estimate 82 >60 mL/min/1.73m2    Calcium 9.7 8.8 -  10.2 mg/dL    Chloride 105 98 - 107 mmol/L    Glucose 129 (H) 70 - 99 mg/dL    Alkaline Phosphatase 44 40 - 150 U/L    AST 25 0 - 45 U/L    ALT 35 0 - 50 U/L    Protein Total 7.3 6.4 - 8.3 g/dL    Albumin 4.4 3.5 - 5.2 g/dL    Bilirubin Total 0.3 <=1.2 mg/dL   CBC with platelets     Status: Abnormal   Result Value Ref Range    WBC Count 5.4 4.0 - 11.0 10e3/uL    RBC Count 4.69 3.80 - 5.20 10e6/uL    Hemoglobin 11.6 (L) 11.7 - 15.7 g/dL    Hematocrit 37.7 35.0 - 47.0 %    MCV 80 78 - 100 fL    MCH 24.7 (L) 26.5 - 33.0 pg    MCHC 30.8 (L) 31.5 - 36.5 g/dL    RDW 15.6 (H) 10.0 - 15.0 %    Platelet Count 236 150 - 450 10e3/uL   T4 free     Status: Normal   Result Value Ref Range    Free T4 1.64 0.90 - 1.70 ng/dL       ASSESSMENT/PLAN:   1. Routine general medical examination at a health care facility  Routine history and physical, updated in EMR.  Non-fasting labs updated as below.  Immunizations updated today.  Mammogram is up to date, will be due next month.  Patient has her colonoscopy scheduled.  Pap smear updated today.  Plan repeat physical in 1 year.  - Comprehensive metabolic panel (BMP + Alb, Alk Phos, ALT, AST, Total. Bili, TP)  - CBC with platelets    2. Type 2 diabetes mellitus with hyperglycemia, with long-term current use of insulin (H)  Continues on Ozempic, Tresiba, Jardiance and metformin.  A1c is pending at time of note.  Likely follow-up in 6 months.  - Lipid panel reflex to direct LDL Non-fasting  - Albumin Random Urine Quantitative with Creat Ratio  - Comprehensive metabolic panel (BMP + Alb, Alk Phos, ALT, AST, Total. Bili, TP)  - CBC with platelets    3. Hypothyroidism, unspecified type  TSH is lower than ideal today.  This was borderline low with last check as well.  Recommended decreasing levothyroxine dose slightly with recheck in 6 weeks.  - TSH WITH FREE T4 REFLEX  - T4 free  - levothyroxine (SYNTHROID/LEVOTHROID) 100 MCG tablet; Take 1 tablet (100 mcg) by mouth daily  Dispense: 90 tablet;  Refill: 1  - TSH with free T4 reflex; Future    4. Mixed hyperlipidemia  Continues on simvastatin and tolerates this well.  LDL well within goal range.  - Comprehensive metabolic panel (BMP + Alb, Alk Phos, ALT, AST, Total. Bili, TP)  - CBC with platelets    5. Chronic right shoulder pain  Has worked with Orthopedics.  Currently pain is tolerable and does not desire further workup or treatment right now.    6. Cervical cancer screening  Routine pap smear updated today.  - Pap Screen with HPV - recommended age 30 - 65 years    7. Screen for colon cancer  Patient has a colonoscopy scheduled.      Patient has been advised of split billing requirements and indicates understanding: Yes      COUNSELING:  Reviewed preventive health counseling, as reflected in patient instructions  Special attention given to:        Regular exercise       Healthy diet/nutrition       Immunizations  Vaccinated for: Covid-19 and Influenza         Colorectal Cancer Screening       The 10-year ASCVD risk score (Jennifer RIVERA, et al., 2019) is: 5.1%    Values used to calculate the score:      Age: 63 years      Sex: Female      Is Non- : No      Diabetic: Yes      Tobacco smoker: No      Systolic Blood Pressure: 115 mmHg      Is BP treated: No      HDL Cholesterol: 53 mg/dL      Total Cholesterol: 140 mg/dL        She reports that she has never smoked. She has never used smokeless tobacco.          Carol Chavez MD  [facility]

## 2023-11-22 LAB
ALBUMIN SERPL BCG-MCNC: 4.4 G/DL (ref 3.5–5.2)
ALP SERPL-CCNC: 44 U/L (ref 40–150)
ALT SERPL W P-5'-P-CCNC: 35 U/L (ref 0–50)
ANION GAP SERPL CALCULATED.3IONS-SCNC: 12 MMOL/L (ref 7–15)
AST SERPL W P-5'-P-CCNC: 25 U/L (ref 0–45)
BILIRUB SERPL-MCNC: 0.3 MG/DL
BUN SERPL-MCNC: 18.5 MG/DL (ref 8–23)
CALCIUM SERPL-MCNC: 9.7 MG/DL (ref 8.8–10.2)
CHLORIDE SERPL-SCNC: 105 MMOL/L (ref 98–107)
CHOLEST SERPL-MCNC: 140 MG/DL
CREAT SERPL-MCNC: 0.81 MG/DL (ref 0.51–0.95)
CREAT UR-MCNC: 59.6 MG/DL
DEPRECATED HCO3 PLAS-SCNC: 22 MMOL/L (ref 22–29)
EGFRCR SERPLBLD CKD-EPI 2021: 82 ML/MIN/1.73M2
GLUCOSE SERPL-MCNC: 129 MG/DL (ref 70–99)
HDLC SERPL-MCNC: 53 MG/DL
LDLC SERPL CALC-MCNC: 48 MG/DL
MICROALBUMIN UR-MCNC: <12 MG/L
MICROALBUMIN/CREAT UR: NORMAL MG/G{CREAT}
NONHDLC SERPL-MCNC: 87 MG/DL
POTASSIUM SERPL-SCNC: 4.1 MMOL/L (ref 3.4–5.3)
PROT SERPL-MCNC: 7.3 G/DL (ref 6.4–8.3)
SODIUM SERPL-SCNC: 139 MMOL/L (ref 135–145)
T4 FREE SERPL-MCNC: 1.64 NG/DL (ref 0.9–1.7)
TRIGL SERPL-MCNC: 193 MG/DL
TSH SERPL DL<=0.005 MIU/L-ACNC: 0.12 UIU/ML (ref 0.3–4.2)

## 2023-11-22 RX ORDER — LEVOTHYROXINE SODIUM 100 UG/1
100 TABLET ORAL DAILY
Qty: 90 TABLET | Refills: 1 | Status: SHIPPED | OUTPATIENT
Start: 2023-11-22 | End: 2024-05-03

## 2023-11-24 LAB — HBA1C MFR BLD: 6.4 % (ref 0–5.6)

## 2023-11-27 LAB
BKR LAB AP GYN ADEQUACY: NORMAL
BKR LAB AP GYN INTERPRETATION: NORMAL
BKR LAB AP HPV REFLEX: NORMAL
BKR LAB AP PREVIOUS ABNORMAL: NORMAL
PATH REPORT.COMMENTS IMP SPEC: NORMAL
PATH REPORT.COMMENTS IMP SPEC: NORMAL
PATH REPORT.RELEVANT HX SPEC: NORMAL

## 2023-11-29 ENCOUNTER — MYC MEDICAL ADVICE (OUTPATIENT)
Dept: FAMILY MEDICINE | Facility: CLINIC | Age: 63
End: 2023-11-29
Payer: COMMERCIAL

## 2023-11-29 LAB
HUMAN PAPILLOMA VIRUS 16 DNA: NEGATIVE
HUMAN PAPILLOMA VIRUS 18 DNA: NEGATIVE
HUMAN PAPILLOMA VIRUS FINAL DIAGNOSIS: NORMAL
HUMAN PAPILLOMA VIRUS OTHER HR: NEGATIVE

## 2023-12-12 ENCOUNTER — TRANSFERRED RECORDS (OUTPATIENT)
Dept: HEALTH INFORMATION MANAGEMENT | Facility: CLINIC | Age: 63
End: 2023-12-12
Payer: COMMERCIAL

## 2023-12-28 ENCOUNTER — TELEPHONE (OUTPATIENT)
Dept: FAMILY MEDICINE | Facility: CLINIC | Age: 63
End: 2023-12-28

## 2023-12-28 NOTE — TELEPHONE ENCOUNTER
General Call      Reason for Call:  [facility] is calling in to let us know that they are waiting on the drug therapy change paperwork that they sent over this morning.       Could we send this information to you in [place] or would you prefer to receive a phone call?:   Patient would prefer a phone call   Okay to leave a detailed message?: Yes at Cell number on file:    Telephone Information:   Mobile [phone]

## 2023-12-29 ENCOUNTER — ANCILLARY PROCEDURE (OUTPATIENT)
Dept: MAMMOGRAPHY | Facility: CLINIC | Age: 63
End: 2023-12-29
Attending: FAMILY MEDICINE
Payer: COMMERCIAL

## 2023-12-29 DIAGNOSIS — Z12.31 VISIT FOR SCREENING MAMMOGRAM: ICD-10-CM

## 2023-12-29 PROCEDURE — 77063 BREAST TOMOSYNTHESIS BI: CPT

## 2023-12-29 NOTE — TELEPHONE ENCOUNTER
I would recommend checking with patient about this -- occasionally we will get these requests to change brands without the patient's knowledge.  Is she aware/ok with this change?

## 2023-12-29 NOTE — TELEPHONE ENCOUNTER
Incoming fax from [facility] requesting drug therapy change authorization.    Current medication: one touch ultra blue #100.    Therapeutic alternatives: 1)onetouch verio #100  2)onetouch verio glucose meter

## 2024-01-08 NOTE — TELEPHONE ENCOUNTER
I spoke to patient and she is not aware of needing to switch her glucometer, she has enough testing supplies for the next couple months.

## 2024-01-14 DIAGNOSIS — E11.9 TYPE 2 DIABETES MELLITUS WITHOUT COMPLICATION, WITHOUT LONG-TERM CURRENT USE OF INSULIN (H): ICD-10-CM

## 2024-01-15 RX ORDER — BLOOD SUGAR DIAGNOSTIC
STRIP MISCELLANEOUS
Qty: 300 STRIP | Refills: 3 | Status: SHIPPED | OUTPATIENT
Start: 2024-01-15

## 2024-02-01 DIAGNOSIS — E11.9 TYPE 2 DIABETES MELLITUS WITHOUT COMPLICATION, WITHOUT LONG-TERM CURRENT USE OF INSULIN (H): ICD-10-CM

## 2024-02-01 DIAGNOSIS — E78.5 HYPERLIPIDEMIA: ICD-10-CM

## 2024-02-02 ENCOUNTER — MYC REFILL (OUTPATIENT)
Dept: FAMILY MEDICINE | Facility: CLINIC | Age: 64
End: 2024-02-02
Payer: COMMERCIAL

## 2024-02-02 DIAGNOSIS — E11.9 TYPE 2 DIABETES MELLITUS WITHOUT COMPLICATION, WITHOUT LONG-TERM CURRENT USE OF INSULIN (H): ICD-10-CM

## 2024-02-02 DIAGNOSIS — E78.5 HYPERLIPIDEMIA: ICD-10-CM

## 2024-02-02 RX ORDER — SIMVASTATIN 40 MG
TABLET ORAL
Qty: 90 TABLET | Refills: 3 | OUTPATIENT
Start: 2024-02-02

## 2024-02-02 RX ORDER — SIMVASTATIN 40 MG
40 TABLET ORAL AT BEDTIME
Qty: 90 TABLET | Refills: 0 | Status: SHIPPED | OUTPATIENT
Start: 2024-02-02 | End: 2024-05-03

## 2024-04-28 DIAGNOSIS — Z79.4 TYPE 2 DIABETES MELLITUS WITH HYPERGLYCEMIA, WITH LONG-TERM CURRENT USE OF INSULIN (H): ICD-10-CM

## 2024-04-28 DIAGNOSIS — E11.65 TYPE 2 DIABETES MELLITUS WITH HYPERGLYCEMIA, WITH LONG-TERM CURRENT USE OF INSULIN (H): ICD-10-CM

## 2024-04-29 RX ORDER — SEMAGLUTIDE 0.68 MG/ML
INJECTION, SOLUTION SUBCUTANEOUS
Qty: 9 ML | Refills: 1 | Status: SHIPPED | OUTPATIENT
Start: 2024-04-29 | End: 2024-06-17

## 2024-05-01 DIAGNOSIS — E78.5 HYPERLIPIDEMIA: ICD-10-CM

## 2024-05-01 DIAGNOSIS — E03.9 HYPOTHYROIDISM, UNSPECIFIED TYPE: ICD-10-CM

## 2024-05-01 DIAGNOSIS — E11.9 TYPE 2 DIABETES MELLITUS WITHOUT COMPLICATION, WITHOUT LONG-TERM CURRENT USE OF INSULIN (H): ICD-10-CM

## 2024-05-03 RX ORDER — SIMVASTATIN 40 MG
40 TABLET ORAL AT BEDTIME
Qty: 90 TABLET | Refills: 1 | Status: SHIPPED | OUTPATIENT
Start: 2024-05-03 | End: 2024-08-11

## 2024-05-03 RX ORDER — LEVOTHYROXINE SODIUM 100 UG/1
100 TABLET ORAL DAILY
Qty: 90 TABLET | Refills: 1 | Status: SHIPPED | OUTPATIENT
Start: 2024-05-03 | End: 2024-08-11

## 2024-06-02 DIAGNOSIS — Z79.4 TYPE 2 DIABETES MELLITUS WITH HYPERGLYCEMIA, WITH LONG-TERM CURRENT USE OF INSULIN (H): ICD-10-CM

## 2024-06-02 DIAGNOSIS — E11.65 TYPE 2 DIABETES MELLITUS WITH HYPERGLYCEMIA, WITH LONG-TERM CURRENT USE OF INSULIN (H): ICD-10-CM

## 2024-06-03 ENCOUNTER — NURSE TRIAGE (OUTPATIENT)
Dept: NURSING | Facility: CLINIC | Age: 64
End: 2024-06-03
Payer: COMMERCIAL

## 2024-06-03 NOTE — TELEPHONE ENCOUNTER
Pt calling     Spent time this last weekend in woods in [facility]  Had several ticks on her yesterday     Discovered a deer tick attached this morning on left upper leg/thigh/groin area  Tick was very attached - had a difficult time getting it out  Area is red and tender   Reports redness area is larger at time of this call than it was this morning    Denies;  Fever  Headache  Rash    According to the protocol, patient should see a HCP today.  Care advice given. Patient verbalizes understanding and agrees with plan of care. Transferred to scheduling.     Mary Monique RN, Nurse Advisor 12:22 PM 6/3/2024  Reason for Disposition   Patient wants to be seen    Additional Information   Negative: Not a tick bite   Negative: Patient sounds very sick or weak to the triager   Negative: Fever or severe headache occurs, 2 to 14 days following the bite   Negative: Widespread rash occurs, 2 to 14 days following the bite   Negative: Can't remove live tick (after using Care Advice)   Negative: Fever and spreading red area or streak   Negative: Fever and area is very tender to touch   Negative: Red streak or red line and length > 2 inches (5 cm)   Negative: Red or very tender (to touch) area and started over 24 hours after the bite   Negative: Red ring or bull's-eye rash occurs around a deer tick bite   Negative: Probable deer tick that was attached > 36 hours (or tick appears swollen, not flat)    Protocols used: Tick Bite-A-OH

## 2024-06-03 NOTE — TELEPHONE ENCOUNTER
Please clarify whether patient is taking 14 or 16 units of Tresiba.  Also, she is due for diabetes follow-up and assist her with scheduling if she is willing.

## 2024-06-04 ENCOUNTER — OFFICE VISIT (OUTPATIENT)
Dept: FAMILY MEDICINE | Facility: CLINIC | Age: 64
End: 2024-06-04
Payer: COMMERCIAL

## 2024-06-04 VITALS
SYSTOLIC BLOOD PRESSURE: 124 MMHG | DIASTOLIC BLOOD PRESSURE: 68 MMHG | RESPIRATION RATE: 16 BRPM | HEART RATE: 68 BPM | OXYGEN SATURATION: 99 % | HEIGHT: 61 IN | BODY MASS INDEX: 27.9 KG/M2 | TEMPERATURE: 98.2 F | WEIGHT: 147.8 LBS
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
## 2024-06-04 DIAGNOSIS — S30.860A TICK BITE OF PELVIC REGION, INITIAL ENCOUNTER: Primary | ICD-10-CM

## 2024-06-04 PROCEDURE — 99214 OFFICE O/P EST MOD 30 MIN: CPT | Performed by: STUDENT IN AN ORGANIZED HEALTH CARE EDUCATION/TRAINING PROGRAM

## 2024-06-04 RX ORDER — ACYCLOVIR 400 MG/1
1 TABLET ORAL
COMMUNITY
Start: 2024-04-25 | End: 2024-08-11

## 2024-06-04 RX ORDER — DOXYCYCLINE 100 MG/1
200 CAPSULE ORAL ONCE
Qty: 2 CAPSULE | Refills: 0 | Status: SHIPPED | OUTPATIENT
Start: 2024-06-04 | End: 2024-06-04

## 2024-06-04 RX ORDER — INSULIN DEGLUDEC 100 U/ML
16 INJECTION, SOLUTION SUBCUTANEOUS AT BEDTIME
Qty: 15 ML | Refills: 3 | Status: SHIPPED | OUTPATIENT
Start: 2024-06-04 | End: 2024-06-13

## 2024-06-04 ASSESSMENT — PAIN SCALES - GENERAL: PAINLEVEL: NO PAIN (0)

## 2024-06-04 NOTE — PROGRESS NOTES
Assessment & Plan     Tick bite of pelvic region, initial encounter  - doxycycline hyclate (VIBRAMYCIN) 100 MG capsule  Dispense: 2 capsule; Refill: 0    On exam, there is no evidence of erythema migraines.  There is a small discrete red area around 1 mm in size where the tick bite used to be.  There is no surrounding cellulitis, no evidence of retained tick.  I do not think patient meets criteria for treatment at this time.  She does meet criteria for prophylaxis.  Will do a one-time dose of doxycycline.  Advised patient to reach out if she develops body aches, cardiac symptoms, fevers, chills and we will do IgG and IgM in about 2 weeks.        Subjective   Yesenia is a 63 year old, presenting for the following health issues:  Insect Bites (On [place] and [place] tick bites. She had 2 tick bits but is concern about the one on her left groin. She was able to take it off and the infected area is still red and would like that looked at today. )        6/4/2024     8:25 AM   Additional Questions   Roomed by Shukri LEMA MA   Accompanied by Self     Patient [place] on [place].  She went out to walk in the woods.  The woods do have ticks and she has had several tick bites in the past.  When she came back on Sunday night, she did remove a tick from her left thigh.  The next day, however, she found another tick that had buried itself along her underwear line on the left.  The tick was not necessarily engorged .   tried to remove the tick but he was unable to remove it completely on the first go.  He then used tweezers and was able to remove the head.  She does not think there is any retained tick left but she does note that there is redness and some mild tenderness where the tick was.  She denies any body aches, skipped heartbeats, low heartbeats, lightheadedness, dizziness, headaches, neurologic deficits, fevers or chills.          Review of Systems  As per HPI      Objective   "  /68 (BP Location: Right arm, Patient Position: Sitting, Cuff Size: Adult Regular)   Pulse 68   Temp 98.2  F (36.8  C) (Oral)   Resp 16   Ht 1.549 m (5' 1\")   Wt 67 kg (147 lb 12.8 oz)   SpO2 99%   BMI 27.93 kg/m    Body mass index is 27.93 kg/m .  Physical Exam   GENERAL: alert and no distress  SKIN: Full body check did not reveal any ticks.  Patient did have a small, discrete, circular and erythematous area measuring around 1 mm near the left inguinal region.  No fluctuance, drainage, erythema migrans, surrounding cellulitis noted.  No retained tick was noted.        Signed Electronically by: Anabell Holliday DO    "

## 2024-06-13 ENCOUNTER — OFFICE VISIT (OUTPATIENT)
Dept: FAMILY MEDICINE | Facility: CLINIC | Age: 64
End: 2024-06-13
Payer: COMMERCIAL

## 2024-06-13 VITALS
WEIGHT: 149 LBS | DIASTOLIC BLOOD PRESSURE: 51 MMHG | RESPIRATION RATE: 16 BRPM | OXYGEN SATURATION: 98 % | SYSTOLIC BLOOD PRESSURE: 107 MMHG | BODY MASS INDEX: 28.13 KG/M2 | HEART RATE: 71 BPM | HEIGHT: 61 IN | TEMPERATURE: 98 F

## 2024-06-13 DIAGNOSIS — E03.9 HYPOTHYROIDISM, UNSPECIFIED TYPE: ICD-10-CM

## 2024-06-13 DIAGNOSIS — M25.532 LEFT WRIST PAIN: ICD-10-CM

## 2024-06-13 DIAGNOSIS — E11.65 TYPE 2 DIABETES MELLITUS WITH HYPERGLYCEMIA, WITH LONG-TERM CURRENT USE OF INSULIN (H): Primary | ICD-10-CM

## 2024-06-13 DIAGNOSIS — Z79.4 TYPE 2 DIABETES MELLITUS WITH HYPERGLYCEMIA, WITH LONG-TERM CURRENT USE OF INSULIN (H): Primary | ICD-10-CM

## 2024-06-13 PROBLEM — K63.5 POLYP OF COLON: Status: ACTIVE | Noted: 2023-12-12

## 2024-06-13 LAB
HBA1C MFR BLD: 6.3 % (ref 0–5.6)
HOLD SPECIMEN: NORMAL
HOLD SPECIMEN: NORMAL

## 2024-06-13 PROCEDURE — 36415 COLL VENOUS BLD VENIPUNCTURE: CPT | Performed by: FAMILY MEDICINE

## 2024-06-13 PROCEDURE — 83036 HEMOGLOBIN GLYCOSYLATED A1C: CPT | Performed by: FAMILY MEDICINE

## 2024-06-13 PROCEDURE — 86200 CCP ANTIBODY: CPT | Performed by: FAMILY MEDICINE

## 2024-06-13 PROCEDURE — G2211 COMPLEX E/M VISIT ADD ON: HCPCS | Performed by: FAMILY MEDICINE

## 2024-06-13 PROCEDURE — 86431 RHEUMATOID FACTOR QUANT: CPT | Performed by: FAMILY MEDICINE

## 2024-06-13 PROCEDURE — 84443 ASSAY THYROID STIM HORMONE: CPT | Performed by: FAMILY MEDICINE

## 2024-06-13 PROCEDURE — 99214 OFFICE O/P EST MOD 30 MIN: CPT | Performed by: FAMILY MEDICINE

## 2024-06-13 PROCEDURE — 86140 C-REACTIVE PROTEIN: CPT | Performed by: FAMILY MEDICINE

## 2024-06-13 RX ORDER — INSULIN DEGLUDEC 100 U/ML
14 INJECTION, SOLUTION SUBCUTANEOUS AT BEDTIME
Status: SHIPPED
Start: 2024-06-13 | End: 2024-06-17

## 2024-06-13 NOTE — PROGRESS NOTES
"  Assessment & Plan     Type 2 diabetes mellitus with hyperglycemia, with long-term current use of insulin (H)  Congratulated patient on her excellent control of diabetes and lifestyle changes she has made.  Recommended coming down to 14 units of her Tresiba as she occasionally feels shaky during the day and AM fasting sugars are well within goal.  Plan will be to maximize her Ozempic and come down further on her insulin.  Will work with diabetic education in terms of how much to lower her Tresiba as we increase Ozempic.  - HEMOGLOBIN A1C  - insulin degludec (TRESIBA FLEXTOUCH) 100 UNIT/ML pen; Inject 14 Units Subcutaneous at bedtime    Hypothyroidism, unspecified type  Continues on levothyroxine.  Recently decreased dose.  Recheck TSH is pending at time of note.  - TSH with free T4 reflex    Left wrist pain  Mother had RA.  Patient does not particularly show signs of this on exam, likely tendinopathy of the wrist, but will engage in lab workup as below.  - CRP inflammation  - Rheumatoid factor  - Cyclic Citrullinated Peptide Antibody IgG          BMI  Estimated body mass index is 28.15 kg/m  as calculated from the following:    Height as of this encounter: 1.549 m (5' 1\").    Weight as of this encounter: 67.6 kg (149 lb).   Weight management plan: Discussed healthy diet and exercise guidelines          Subjective   Yesenia is a 63 year old, presenting for the following health issues:  Diabetes        6/13/2024     1:31 PM   Additional Questions   Roomed by May PATTERSON LPN     HPI     Patient presents primarily for follow-up of type II diabetes mellitus.  She has been feeling well and has been trying to eat healthy and get regular exercise.  Her only concern today is that of lateral left wrist pain, worse with movement like golfing.  Her mother had RA and she has some concern for this.  She denies obvious morning stiffness, swelling or finger pains.  She brings in her meter today and checks morning fasting sugars only " "lately.  These are as follows from most recent to more distant: 80, 87, 91, 107, 77, 81, 92, 72, 85, 135, 124, 88, 87, 107, 94, 94, 97, 78, 97, 85.      Review of Systems  Constitutional, HEENT, cardiovascular, pulmonary, gi and gu systems are negative, except as otherwise noted.      Objective    /51   Pulse 71   Temp 98  F (36.7  C) (Oral)   Resp 16   Ht 1.549 m (5' 1\")   Wt 67.6 kg (149 lb)   SpO2 98%   BMI 28.15 kg/m    Body mass index is 28.15 kg/m .  Physical Exam   GENERAL: alert and no distress  RESP: lungs clear to auscultation - no rales, rhonchi or wheezes  CV: regular rate and rhythm, normal S1 S2, no S3 or S4, no murmur, click or rub, no peripheral edema  MS: no gross musculoskeletal defects noted, no edema  NEURO: Normal strength and tone, mentation intact and speech normal  PSYCH: mentation appears normal, affect normal/bright    Results for orders placed or performed in visit on 06/13/24   HEMOGLOBIN A1C     Status: Abnormal   Result Value Ref Range    Hemoglobin A1C 6.3 (H) 0.0 - 5.6 %   Extra Tube     Status: None    Narrative    The following orders were created for panel order Extra Tube.  Procedure                               Abnormality         Status                     ---------                               -----------         ------                     Extra Green Top (Lithium...[[id number]]                      Final result                 Please view results for these tests on the individual orders.   Extra Green Top (Lithium Heparin) Tube     Status: None   Result Value Ref Range    Hold Specimen JIC    Extra Tube     Status: None    Narrative    The following orders were created for panel order Extra Tube.  Procedure                               Abnormality         Status                     ---------                               -----------         ------                     Extra Red Top Tube[[id number]]                               Final result                 Please " view results for these tests on the individual orders.   Extra Red Top Tube     Status: None   Result Value Ref Range    Hold Specimen JIC        Additional labs pending at time of note.      The longitudinal plan of care for the diagnosis(es)/condition(s) as documented were addressed during this visit. Due to the added complexity in care, I will continue to support Yesenia in the subsequent management and with ongoing continuity of care.     Signed Electronically by: Carol Chavez MD

## 2024-06-14 PROBLEM — M25.532 LEFT WRIST PAIN: Status: ACTIVE | Noted: 2024-06-14

## 2024-06-14 LAB
CRP SERPL-MCNC: <3 MG/L
RHEUMATOID FACT SERPL-ACNC: <10 IU/ML
TSH SERPL DL<=0.005 MIU/L-ACNC: 0.49 UIU/ML (ref 0.3–4.2)

## 2024-06-15 LAB — CCP AB SER IA-ACNC: 2 U/ML

## 2024-06-17 ENCOUNTER — MYC MEDICAL ADVICE (OUTPATIENT)
Dept: FAMILY MEDICINE | Facility: CLINIC | Age: 64
End: 2024-06-17
Payer: COMMERCIAL

## 2024-06-17 DIAGNOSIS — Z79.4 TYPE 2 DIABETES MELLITUS WITH HYPERGLYCEMIA, WITH LONG-TERM CURRENT USE OF INSULIN (H): ICD-10-CM

## 2024-06-17 DIAGNOSIS — E11.65 TYPE 2 DIABETES MELLITUS WITH HYPERGLYCEMIA, WITH LONG-TERM CURRENT USE OF INSULIN (H): ICD-10-CM

## 2024-06-17 RX ORDER — INSULIN DEGLUDEC 100 U/ML
12 INJECTION, SOLUTION SUBCUTANEOUS AT BEDTIME
Status: SHIPPED
Start: 2024-06-17 | End: 2024-08-11

## 2024-06-18 NOTE — PROGRESS NOTES
Spoke with diabetic educator about how to lower insulin when going up on GLP-1.  Will raise Ozempic to 1 mg weekly and lower Tresiba to 12 units daily.  Patient to send blood sugar readings via [employer] on new regimen.

## 2024-06-19 NOTE — TELEPHONE ENCOUNTER
Pt calling in stating that this RX was supposed to go to [facility] pharmacy.   Please advise.

## 2024-07-11 ENCOUNTER — MYC MEDICAL ADVICE (OUTPATIENT)
Dept: FAMILY MEDICINE | Facility: CLINIC | Age: 64
End: 2024-07-11
Payer: COMMERCIAL

## 2024-07-11 DIAGNOSIS — Z79.4 TYPE 2 DIABETES MELLITUS WITH HYPERGLYCEMIA, WITH LONG-TERM CURRENT USE OF INSULIN (H): ICD-10-CM

## 2024-07-11 DIAGNOSIS — M25.511 CHRONIC RIGHT SHOULDER PAIN: Primary | ICD-10-CM

## 2024-07-11 DIAGNOSIS — E11.65 TYPE 2 DIABETES MELLITUS WITH HYPERGLYCEMIA, WITH LONG-TERM CURRENT USE OF INSULIN (H): ICD-10-CM

## 2024-07-11 DIAGNOSIS — G89.29 CHRONIC RIGHT SHOULDER PAIN: Primary | ICD-10-CM

## 2024-07-11 DIAGNOSIS — E11.9 TYPE 2 DIABETES MELLITUS WITHOUT COMPLICATION, WITHOUT LONG-TERM CURRENT USE OF INSULIN (H): ICD-10-CM

## 2024-07-12 RX ORDER — PEN NEEDLE, DIABETIC 32GX 5/32"
NEEDLE, DISPOSABLE MISCELLANEOUS
Qty: 100 EACH | Refills: 2 | Status: SHIPPED | OUTPATIENT
Start: 2024-07-12

## 2024-07-15 NOTE — TELEPHONE ENCOUNTER
Last semaglutide script is only for 30 day supply with 1 refill.    Do you want to send in a script with 90 day dispense instructions?    Jacob Rey RN     [facility]

## 2024-07-16 ENCOUNTER — TRANSFERRED RECORDS (OUTPATIENT)
Dept: HEALTH INFORMATION MANAGEMENT | Facility: CLINIC | Age: 64
End: 2024-07-16
Payer: COMMERCIAL

## 2024-07-17 ENCOUNTER — TRANSFERRED RECORDS (OUTPATIENT)
Dept: HEALTH INFORMATION MANAGEMENT | Facility: CLINIC | Age: 64
End: 2024-07-17
Payer: COMMERCIAL

## 2024-07-30 DIAGNOSIS — E11.9 TYPE 2 DIABETES MELLITUS WITHOUT COMPLICATION, WITHOUT LONG-TERM CURRENT USE OF INSULIN (H): ICD-10-CM

## 2024-07-31 RX ORDER — EMPAGLIFLOZIN 25 MG/1
TABLET, FILM COATED ORAL
Qty: 90 TABLET | Refills: 1 | Status: SHIPPED | OUTPATIENT
Start: 2024-07-31 | End: 2024-08-11

## 2024-08-10 ENCOUNTER — HOSPITAL ENCOUNTER (INPATIENT)
Facility: HOSPITAL | Age: 64
LOS: 3 days | Discharge: HOME | DRG: 871 | End: 2024-08-14
Attending: EMERGENCY MEDICINE | Admitting: STUDENT IN AN ORGANIZED HEALTH CARE EDUCATION/TRAINING PROGRAM
Payer: COMMERCIAL

## 2024-08-10 ENCOUNTER — OFFICE VISIT (OUTPATIENT)
Dept: FAMILY MEDICINE | Facility: CLINIC | Age: 64
End: 2024-08-10
Payer: COMMERCIAL

## 2024-08-10 VITALS
OXYGEN SATURATION: 96 % | DIASTOLIC BLOOD PRESSURE: 73 MMHG | WEIGHT: 147 LBS | HEART RATE: 110 BPM | RESPIRATION RATE: 16 BRPM | TEMPERATURE: 99.4 F | BODY MASS INDEX: 27.78 KG/M2 | SYSTOLIC BLOOD PRESSURE: 122 MMHG

## 2024-08-10 DIAGNOSIS — N17.9 AKI (ACUTE KIDNEY INJURY) (H): ICD-10-CM

## 2024-08-10 DIAGNOSIS — J06.9 UPPER RESPIRATORY TRACT INFECTION, UNSPECIFIED TYPE: Primary | ICD-10-CM

## 2024-08-10 DIAGNOSIS — D61.818 PANCYTOPENIA (H): ICD-10-CM

## 2024-08-10 DIAGNOSIS — R50.9 FEBRILE ILLNESS, ACUTE: ICD-10-CM

## 2024-08-10 DIAGNOSIS — E53.8 VITAMIN B12 DEFICIENCY (NON ANEMIC): ICD-10-CM

## 2024-08-10 DIAGNOSIS — A77.49 POSITIVE ANAPLASMA SEROLOGY: Primary | ICD-10-CM

## 2024-08-10 LAB
ALBUMIN SERPL BCG-MCNC: 3.8 G/DL (ref 3.5–5.2)
ALP SERPL-CCNC: 40 U/L (ref 40–150)
ALT SERPL W P-5'-P-CCNC: 55 U/L (ref 0–50)
ANION GAP SERPL CALCULATED.3IONS-SCNC: 16 MMOL/L (ref 7–15)
AST SERPL W P-5'-P-CCNC: 84 U/L (ref 0–45)
BILIRUB DIRECT SERPL-MCNC: 0.3 MG/DL (ref 0–0.3)
BILIRUB SERPL-MCNC: 0.8 MG/DL
BUN SERPL-MCNC: 25.9 MG/DL (ref 8–23)
CALCIUM SERPL-MCNC: 8.2 MG/DL (ref 8.8–10.4)
CHLORIDE SERPL-SCNC: 101 MMOL/L (ref 98–107)
CREAT SERPL-MCNC: 1.19 MG/DL (ref 0.51–0.95)
CRP SERPL-MCNC: 130.7 MG/L
EGFRCR SERPLBLD CKD-EPI 2021: 51 ML/MIN/1.73M2
ERYTHROCYTE [SEDIMENTATION RATE] IN BLOOD BY WESTERGREN METHOD: 32 MM/HR (ref 0–30)
FLUAV RNA SPEC QL NAA+PROBE: NEGATIVE
FLUBV RNA RESP QL NAA+PROBE: NEGATIVE
GLUCOSE SERPL-MCNC: 158 MG/DL (ref 70–99)
HCO3 SERPL-SCNC: 19 MMOL/L (ref 22–29)
LACTATE SERPL-SCNC: 2 MMOL/L (ref 0.7–2)
POTASSIUM SERPL-SCNC: 3.4 MMOL/L (ref 3.4–5.3)
PROCALCITONIN SERPL IA-MCNC: 1.77 NG/ML
PROT SERPL-MCNC: 6.7 G/DL (ref 6.4–8.3)
RSV RNA SPEC NAA+PROBE: NEGATIVE
SARS-COV-2 RNA RESP QL NAA+PROBE: NEGATIVE
SARS-COV-2 RNA RESP QL NAA+PROBE: NEGATIVE
SODIUM SERPL-SCNC: 136 MMOL/L (ref 135–145)
TSH SERPL DL<=0.005 MIU/L-ACNC: 0.18 UIU/ML (ref 0.3–4.2)

## 2024-08-10 PROCEDURE — 86140 C-REACTIVE PROTEIN: CPT | Performed by: EMERGENCY MEDICINE

## 2024-08-10 PROCEDURE — 83550 IRON BINDING TEST: CPT | Performed by: STUDENT IN AN ORGANIZED HEALTH CARE EDUCATION/TRAINING PROGRAM

## 2024-08-10 PROCEDURE — 99285 EMERGENCY DEPT VISIT HI MDM: CPT | Mod: 25

## 2024-08-10 PROCEDURE — 85025 COMPLETE CBC W/AUTO DIFF WBC: CPT | Performed by: EMERGENCY MEDICINE

## 2024-08-10 PROCEDURE — 87798 DETECT AGENT NOS DNA AMP: CPT | Performed by: EMERGENCY MEDICINE

## 2024-08-10 PROCEDURE — 99213 OFFICE O/P EST LOW 20 MIN: CPT

## 2024-08-10 PROCEDURE — 80053 COMPREHEN METABOLIC PANEL: CPT | Performed by: EMERGENCY MEDICINE

## 2024-08-10 PROCEDURE — 87040 BLOOD CULTURE FOR BACTERIA: CPT | Performed by: EMERGENCY MEDICINE

## 2024-08-10 PROCEDURE — 36415 COLL VENOUS BLD VENIPUNCTURE: CPT | Performed by: EMERGENCY MEDICINE

## 2024-08-10 PROCEDURE — 87637 SARSCOV2&INF A&B&RSV AMP PRB: CPT | Performed by: EMERGENCY MEDICINE

## 2024-08-10 PROCEDURE — 87635 SARS-COV-2 COVID-19 AMP PRB: CPT

## 2024-08-10 PROCEDURE — 84439 ASSAY OF FREE THYROXINE: CPT | Performed by: EMERGENCY MEDICINE

## 2024-08-10 PROCEDURE — 82248 BILIRUBIN DIRECT: CPT | Performed by: EMERGENCY MEDICINE

## 2024-08-10 PROCEDURE — 82607 VITAMIN B-12: CPT | Performed by: STUDENT IN AN ORGANIZED HEALTH CARE EDUCATION/TRAINING PROGRAM

## 2024-08-10 PROCEDURE — 84443 ASSAY THYROID STIM HORMONE: CPT | Performed by: EMERGENCY MEDICINE

## 2024-08-10 PROCEDURE — 85652 RBC SED RATE AUTOMATED: CPT | Performed by: EMERGENCY MEDICINE

## 2024-08-10 PROCEDURE — 84145 PROCALCITONIN (PCT): CPT | Performed by: EMERGENCY MEDICINE

## 2024-08-10 PROCEDURE — 82728 ASSAY OF FERRITIN: CPT | Performed by: STUDENT IN AN ORGANIZED HEALTH CARE EDUCATION/TRAINING PROGRAM

## 2024-08-10 PROCEDURE — 83605 ASSAY OF LACTIC ACID: CPT | Performed by: EMERGENCY MEDICINE

## 2024-08-10 ASSESSMENT — PAIN SCALES - GENERAL: PAINLEVEL: MILD PAIN (2)

## 2024-08-10 ASSESSMENT — COLUMBIA-SUICIDE SEVERITY RATING SCALE - C-SSRS
1. IN THE PAST MONTH, HAVE YOU WISHED YOU WERE DEAD OR WISHED YOU COULD GO TO SLEEP AND NOT WAKE UP?: NO
2. HAVE YOU ACTUALLY HAD ANY THOUGHTS OF KILLING YOURSELF IN THE PAST MONTH?: NO
6. HAVE YOU EVER DONE ANYTHING, STARTED TO DO ANYTHING, OR PREPARED TO DO ANYTHING TO END YOUR LIFE?: NO

## 2024-08-10 ASSESSMENT — ACTIVITIES OF DAILY LIVING (ADL): ADLS_ACUITY_SCORE: 37

## 2024-08-10 ASSESSMENT — ENCOUNTER SYMPTOMS: FEVER: 1

## 2024-08-10 NOTE — PATIENT INSTRUCTIONS
1. Mucinex (Guaifennesin)     2.  Robitussin    3.  Delsym    4.  Salt water Gargles for sore throat    5.  Tylenol or Ibuprofen    6.  Warm tea with Honey    constipation  -miralax to start, daily  -senna if miralax not helping

## 2024-08-10 NOTE — PROGRESS NOTES
Assessment & Plan     Upper respiratory tract infection, unspecified type  Patient history and exam suggestive of upper respiratory infection due to viral etiology.  COVID swabs collected. Results pending. Discussed the use of OTC medications such as flonase, mucinex, tylenol/ibuprofen, and honey for symptomatic treatment. Discussed patient would meet criteria for Paxlovid to treat if positive. Will follow up on results and whether further direction is necessary. Discussed w/ patient to utilize miralax and stay well hydrated for constipation as well. Senna if miralax is not helping after a couple days. Patient fully understands and is agreeable with plan of care, at this point patient will follow up as needed unless acute concerns arise in the meantime.  - Symptomatic COVID-19 Virus (Coronavirus) by PCR Nose         No follow-ups on file.    [facility] MEENA Patterson is a 63 year old female who presents to clinic today for the following health issues:  Chief Complaint   Patient presents with    Fever     Chills, weak and fatigued-  states will need to help her get out of bed   Decreased appetite        Fever  Associated symptoms include a fever.     URI Adult    Onset of symptoms was 4 day(s) ago.  Course of illness is worsening.    Severity moderately severe  Current and Associated symptoms: fever, chills, sweats, headache, body aches, fatigue, dizziness/lightheadedness, and nausea  Treatment measures tried include Tylenol/Ibuprofen, Fluids, and Rest.  Predisposing factors include None.    Appetite down quite a bit, everything tastes bad  Patient also constipated, using dulcolax    BS  This morning was 138, has not seen any lows.     Review of Systems   Constitutional:  Positive for fever.     Constitutional, HEENT, cardiovascular, pulmonary, gi and gu endocrine systems are negative, except as otherwise noted.      Objective    /73 (BP Location:  Left arm, Patient Position: Sitting, Cuff Size: Adult Regular)   Pulse 110   Temp 99.4  F (37.4  C) (Oral)   Resp 16   Wt 66.7 kg (147 lb)   SpO2 96%   BMI 27.78 kg/m    Physical Exam  Vitals and nursing note reviewed.   Constitutional:       General: She is not in acute distress.     Appearance: Normal appearance. She is not ill-appearing.   HENT:      Right Ear: Tympanic membrane, ear canal and external ear normal. There is no impacted cerumen.      Left Ear: Tympanic membrane, ear canal and external ear normal. There is no impacted cerumen.      Nose: Congestion present. No rhinorrhea.      Mouth/Throat:      Mouth: Mucous membranes are moist.      Pharynx: Posterior oropharyngeal erythema present. No oropharyngeal exudate.   Eyes:      General:         Right eye: No discharge.         Left eye: No discharge.      Conjunctiva/sclera: Conjunctivae normal.      Pupils: Pupils are equal, round, and reactive to light.   Cardiovascular:      Rate and Rhythm: Normal rate and regular rhythm.      Heart sounds: No murmur heard.     No friction rub. No gallop.   Pulmonary:      Effort: No respiratory distress.      Breath sounds: Normal breath sounds. No stridor. No wheezing, rhonchi or rales.   Musculoskeletal:      Cervical back: No tenderness.   Lymphadenopathy:      Cervical: No cervical adenopathy.   Skin:     General: Skin is warm and dry.   Neurological:      Mental Status: She is alert.   Psychiatric:         Mood and Affect: Mood normal.         Behavior: Behavior normal.         Thought Content: Thought content normal.         Judgment: Judgment normal.         No results found for any visits on 08/10/24.

## 2024-08-11 ENCOUNTER — APPOINTMENT (OUTPATIENT)
Dept: CT IMAGING | Facility: HOSPITAL | Age: 64
DRG: 871 | End: 2024-08-11
Attending: EMERGENCY MEDICINE
Payer: COMMERCIAL

## 2024-08-11 ENCOUNTER — APPOINTMENT (OUTPATIENT)
Dept: MRI IMAGING | Facility: HOSPITAL | Age: 64
DRG: 871 | End: 2024-08-11
Attending: STUDENT IN AN ORGANIZED HEALTH CARE EDUCATION/TRAINING PROGRAM
Payer: COMMERCIAL

## 2024-08-11 ENCOUNTER — APPOINTMENT (OUTPATIENT)
Dept: RADIOLOGY | Facility: HOSPITAL | Age: 64
DRG: 871 | End: 2024-08-11
Attending: EMERGENCY MEDICINE
Payer: COMMERCIAL

## 2024-08-11 PROBLEM — N17.9 AKI (ACUTE KIDNEY INJURY) (H): Status: ACTIVE | Noted: 2024-08-11

## 2024-08-11 PROBLEM — A77.49 POSITIVE ANAPLASMA SEROLOGY: Chronic | Status: ACTIVE | Noted: 2024-08-11

## 2024-08-11 PROBLEM — R50.9 FEBRILE ILLNESS, ACUTE: Status: ACTIVE | Noted: 2024-08-11

## 2024-08-11 PROBLEM — A77.49 POSITIVE ANAPLASMA SEROLOGY: Status: ACTIVE | Noted: 2024-08-11

## 2024-08-11 PROBLEM — D61.818 PANCYTOPENIA (H): Status: ACTIVE | Noted: 2024-08-11

## 2024-08-11 LAB
A PHAGOCYTOPH DNA BLD QL NAA+PROBE: DETECTED
ABO/RH(D): NORMAL
ALBUMIN SERPL BCG-MCNC: 3.1 G/DL (ref 3.5–5.2)
ALBUMIN UR-MCNC: 30 MG/DL
ALP SERPL-CCNC: 37 U/L (ref 40–150)
ALT SERPL W P-5'-P-CCNC: 46 U/L (ref 0–50)
ALT SERPL W P-5'-P-CCNC: 51 U/L (ref 0–50)
AMORPH CRY #/AREA URNS HPF: ABNORMAL /HPF
ANION GAP SERPL CALCULATED.3IONS-SCNC: 11 MMOL/L (ref 7–15)
ANTIBODY SCREEN: NEGATIVE
APPEARANCE UR: CLEAR
AST SERPL W P-5'-P-CCNC: 87 U/L (ref 0–45)
BABESIA DNA BLD QL NAA+PROBE: NOT DETECTED
BASOPHILS # BLD AUTO: 0 10E3/UL (ref 0–0.2)
BASOPHILS NFR BLD AUTO: 0 %
BILIRUB SERPL-MCNC: 0.7 MG/DL
BILIRUB UR QL STRIP: NEGATIVE
BUN SERPL-MCNC: 22.8 MG/DL (ref 8–23)
CALCIUM SERPL-MCNC: 7.3 MG/DL (ref 8.8–10.4)
CHLORIDE SERPL-SCNC: 104 MMOL/L (ref 98–107)
COLOR UR AUTO: ABNORMAL
CREAT SERPL-MCNC: 1 MG/DL (ref 0.51–0.95)
EGFRCR SERPLBLD CKD-EPI 2021: 63 ML/MIN/1.73M2
EHRLICHIA DNA SPEC QL NAA+PROBE: NOT DETECTED
ELLIPTOCYTES BLD QL SMEAR: SLIGHT
EOSINOPHIL # BLD AUTO: 0 10E3/UL (ref 0–0.7)
EOSINOPHIL NFR BLD AUTO: 0 %
ERYTHROCYTE [DISTWIDTH] IN BLOOD BY AUTOMATED COUNT: 17 % (ref 10–15)
FERRITIN SERPL-MCNC: 1669 NG/ML (ref 11–328)
FOLATE SERPL-MCNC: 29.5 NG/ML (ref 4.6–34.8)
GLUCOSE BLDC GLUCOMTR-MCNC: 112 MG/DL (ref 70–99)
GLUCOSE BLDC GLUCOMTR-MCNC: 112 MG/DL (ref 70–99)
GLUCOSE BLDC GLUCOMTR-MCNC: 118 MG/DL (ref 70–99)
GLUCOSE BLDC GLUCOMTR-MCNC: 79 MG/DL (ref 70–99)
GLUCOSE BLDC GLUCOMTR-MCNC: 86 MG/DL (ref 70–99)
GLUCOSE BLDC GLUCOMTR-MCNC: 87 MG/DL (ref 70–99)
GLUCOSE SERPL-MCNC: 91 MG/DL (ref 70–99)
GLUCOSE UR STRIP-MCNC: >1000 MG/DL
HCO3 SERPL-SCNC: 18 MMOL/L (ref 22–29)
HCT VFR BLD AUTO: 31.2 % (ref 35–47)
HGB BLD-MCNC: 9.8 G/DL (ref 11.7–15.7)
HGB UR QL STRIP: ABNORMAL
HOLD SPECIMEN: NORMAL
HOLD SPECIMEN: NORMAL
IMM GRANULOCYTES # BLD: 0 10E3/UL
IMM GRANULOCYTES NFR BLD: 0 %
IRON BINDING CAPACITY (ROCHE): 236 UG/DL (ref 240–430)
IRON SATN MFR SERPL: 8 % (ref 15–46)
IRON SERPL-MCNC: 18 UG/DL (ref 37–145)
KETONES UR STRIP-MCNC: NEGATIVE MG/DL
LACTATE SERPL-SCNC: 0.8 MMOL/L (ref 0.7–2)
LEUKOCYTE ESTERASE UR QL STRIP: NEGATIVE
LYMPHOCYTES # BLD AUTO: 0.1 10E3/UL (ref 0.8–5.3)
LYMPHOCYTES NFR BLD AUTO: 10 %
MCH RBC QN AUTO: 24.1 PG (ref 26.5–33)
MCHC RBC AUTO-ENTMCNC: 31.4 G/DL (ref 31.5–36.5)
MCV RBC AUTO: 77 FL (ref 78–100)
MONOCYTES # BLD AUTO: 0.1 10E3/UL (ref 0–1.3)
MONOCYTES NFR BLD AUTO: 8 %
MRSA DNA SPEC QL NAA+PROBE: NEGATIVE
MUCOUS THREADS #/AREA URNS LPF: PRESENT /LPF
NEUTROPHILS # BLD AUTO: 0.9 10E3/UL (ref 1.6–8.3)
NEUTROPHILS NFR BLD AUTO: 82 %
NITRATE UR QL: NEGATIVE
NRBC # BLD AUTO: 0.1 10E3/UL
NRBC BLD AUTO-RTO: 5 /100
PH UR STRIP: 6 [PH] (ref 5–7)
PLAT MORPH BLD: ABNORMAL
PLATELET # BLD AUTO: 23 10E3/UL (ref 150–450)
POLYCHROMASIA BLD QL SMEAR: SLIGHT
POTASSIUM SERPL-SCNC: 3.4 MMOL/L (ref 3.4–5.3)
PROT SERPL-MCNC: 5.8 G/DL (ref 6.4–8.3)
RBC # BLD AUTO: 4.07 10E6/UL (ref 3.8–5.2)
RBC MORPH BLD: ABNORMAL
RBC URINE: 0 /HPF
SA TARGET DNA: NEGATIVE
SODIUM SERPL-SCNC: 133 MMOL/L (ref 135–145)
SP GR UR STRIP: 1.01 (ref 1–1.03)
SPECIMEN EXPIRATION DATE: NORMAL
SQUAMOUS EPITHELIAL: 1 /HPF
T4 FREE SERPL-MCNC: 1.32 NG/DL (ref 0.9–1.7)
UROBILINOGEN UR STRIP-MCNC: <2 MG/DL
VIT B12 SERPL-MCNC: <150 PG/ML (ref 232–1245)
WBC # BLD AUTO: 1.1 10E3/UL (ref 4–11)
WBC URINE: 0 /HPF

## 2024-08-11 PROCEDURE — 99222 1ST HOSP IP/OBS MODERATE 55: CPT | Performed by: INTERNAL MEDICINE

## 2024-08-11 PROCEDURE — 250N000013 HC RX MED GY IP 250 OP 250 PS 637: Performed by: INTERNAL MEDICINE

## 2024-08-11 PROCEDURE — 120N000001 HC R&B MED SURG/OB

## 2024-08-11 PROCEDURE — 99223 1ST HOSP IP/OBS HIGH 75: CPT | Performed by: STUDENT IN AN ORGANIZED HEALTH CARE EDUCATION/TRAINING PROGRAM

## 2024-08-11 PROCEDURE — 258N000003 HC RX IP 258 OP 636: Performed by: EMERGENCY MEDICINE

## 2024-08-11 PROCEDURE — 80053 COMPREHEN METABOLIC PANEL: CPT | Performed by: STUDENT IN AN ORGANIZED HEALTH CARE EDUCATION/TRAINING PROGRAM

## 2024-08-11 PROCEDURE — 99207 PR APP CREDIT; MD BILLING SHARED VISIT: CPT | Performed by: INTERNAL MEDICINE

## 2024-08-11 PROCEDURE — 36415 COLL VENOUS BLD VENIPUNCTURE: CPT | Performed by: STUDENT IN AN ORGANIZED HEALTH CARE EDUCATION/TRAINING PROGRAM

## 2024-08-11 PROCEDURE — 86900 BLOOD TYPING SEROLOGIC ABO: CPT | Performed by: INTERNAL MEDICINE

## 2024-08-11 PROCEDURE — 87640 STAPH A DNA AMP PROBE: CPT | Performed by: STUDENT IN AN ORGANIZED HEALTH CARE EDUCATION/TRAINING PROGRAM

## 2024-08-11 PROCEDURE — 250N000011 HC RX IP 250 OP 636: Performed by: EMERGENCY MEDICINE

## 2024-08-11 PROCEDURE — 258N000002 HC RX IP 258 OP 250: Performed by: INTERNAL MEDICINE

## 2024-08-11 PROCEDURE — 36415 COLL VENOUS BLD VENIPUNCTURE: CPT | Performed by: EMERGENCY MEDICINE

## 2024-08-11 PROCEDURE — 87040 BLOOD CULTURE FOR BACTERIA: CPT | Performed by: STUDENT IN AN ORGANIZED HEALTH CARE EDUCATION/TRAINING PROGRAM

## 2024-08-11 PROCEDURE — 255N000002 HC RX 255 OP 636: Performed by: INTERNAL MEDICINE

## 2024-08-11 PROCEDURE — 71045 X-RAY EXAM CHEST 1 VIEW: CPT

## 2024-08-11 PROCEDURE — 74177 CT ABD & PELVIS W/CONTRAST: CPT

## 2024-08-11 PROCEDURE — 83605 ASSAY OF LACTIC ACID: CPT | Performed by: INTERNAL MEDICINE

## 2024-08-11 PROCEDURE — 82746 ASSAY OF FOLIC ACID SERUM: CPT | Performed by: STUDENT IN AN ORGANIZED HEALTH CARE EDUCATION/TRAINING PROGRAM

## 2024-08-11 PROCEDURE — 84460 ALANINE AMINO (ALT) (SGPT): CPT | Performed by: INTERNAL MEDICINE

## 2024-08-11 PROCEDURE — A9585 GADOBUTROL INJECTION: HCPCS | Performed by: INTERNAL MEDICINE

## 2024-08-11 PROCEDURE — 36415 COLL VENOUS BLD VENIPUNCTURE: CPT | Performed by: INTERNAL MEDICINE

## 2024-08-11 PROCEDURE — 250N000013 HC RX MED GY IP 250 OP 250 PS 637: Performed by: STUDENT IN AN ORGANIZED HEALTH CARE EDUCATION/TRAINING PROGRAM

## 2024-08-11 PROCEDURE — 258N000003 HC RX IP 258 OP 636: Performed by: STUDENT IN AN ORGANIZED HEALTH CARE EDUCATION/TRAINING PROGRAM

## 2024-08-11 PROCEDURE — 250N000013 HC RX MED GY IP 250 OP 250 PS 637: Performed by: EMERGENCY MEDICINE

## 2024-08-11 PROCEDURE — 74183 MRI ABD W/O CNTR FLWD CNTR: CPT

## 2024-08-11 PROCEDURE — 81001 URINALYSIS AUTO W/SCOPE: CPT | Performed by: STUDENT IN AN ORGANIZED HEALTH CARE EDUCATION/TRAINING PROGRAM

## 2024-08-11 PROCEDURE — 86618 LYME DISEASE ANTIBODY: CPT | Performed by: INTERNAL MEDICINE

## 2024-08-11 PROCEDURE — 85025 COMPLETE CBC W/AUTO DIFF WBC: CPT | Performed by: STUDENT IN AN ORGANIZED HEALTH CARE EDUCATION/TRAINING PROGRAM

## 2024-08-11 PROCEDURE — 250N000011 HC RX IP 250 OP 636: Performed by: STUDENT IN AN ORGANIZED HEALTH CARE EDUCATION/TRAINING PROGRAM

## 2024-08-11 RX ORDER — PIPERACILLIN SODIUM, TAZOBACTAM SODIUM 3; .375 G/15ML; G/15ML
3.38 INJECTION, POWDER, LYOPHILIZED, FOR SOLUTION INTRAVENOUS ONCE
Status: COMPLETED | OUTPATIENT
Start: 2024-08-11 | End: 2024-08-11

## 2024-08-11 RX ORDER — AMOXICILLIN 250 MG
2 CAPSULE ORAL 2 TIMES DAILY PRN
Status: DISCONTINUED | OUTPATIENT
Start: 2024-08-11 | End: 2024-08-14 | Stop reason: HOSPADM

## 2024-08-11 RX ORDER — IOPAMIDOL 755 MG/ML
90 INJECTION, SOLUTION INTRAVASCULAR ONCE
Status: COMPLETED | OUTPATIENT
Start: 2024-08-11 | End: 2024-08-11

## 2024-08-11 RX ORDER — CALCIUM CARBONATE 500 MG/1
1000 TABLET, CHEWABLE ORAL 4 TIMES DAILY PRN
Status: DISCONTINUED | OUTPATIENT
Start: 2024-08-11 | End: 2024-08-11

## 2024-08-11 RX ORDER — LEVOTHYROXINE SODIUM 100 UG/1
100 TABLET ORAL EVERY MORNING
COMMUNITY

## 2024-08-11 RX ORDER — AMOXICILLIN 250 MG
1 CAPSULE ORAL 2 TIMES DAILY PRN
Status: DISCONTINUED | OUTPATIENT
Start: 2024-08-11 | End: 2024-08-14 | Stop reason: HOSPADM

## 2024-08-11 RX ORDER — DOXYCYCLINE 100 MG/1
100 CAPSULE ORAL EVERY 12 HOURS SCHEDULED
Status: DISCONTINUED | OUTPATIENT
Start: 2024-08-11 | End: 2024-08-11

## 2024-08-11 RX ORDER — DOXYCYCLINE 100 MG/10ML
100 INJECTION, POWDER, LYOPHILIZED, FOR SOLUTION INTRAVENOUS ONCE
Status: COMPLETED | OUTPATIENT
Start: 2024-08-11 | End: 2024-08-11

## 2024-08-11 RX ORDER — ACETAMINOPHEN 650 MG/1
650 SUPPOSITORY RECTAL EVERY 4 HOURS PRN
Status: DISCONTINUED | OUTPATIENT
Start: 2024-08-11 | End: 2024-08-14 | Stop reason: HOSPADM

## 2024-08-11 RX ORDER — SODIUM CHLORIDE 450 MG/100ML
INJECTION, SOLUTION INTRAVENOUS CONTINUOUS
Status: ACTIVE | OUTPATIENT
Start: 2024-08-11 | End: 2024-08-11

## 2024-08-11 RX ORDER — PIPERACILLIN SODIUM, TAZOBACTAM SODIUM 3; .375 G/15ML; G/15ML
3.38 INJECTION, POWDER, LYOPHILIZED, FOR SOLUTION INTRAVENOUS EVERY 8 HOURS
Status: DISCONTINUED | OUTPATIENT
Start: 2024-08-11 | End: 2024-08-13

## 2024-08-11 RX ORDER — GADOBUTROL 604.72 MG/ML
7 INJECTION INTRAVENOUS ONCE
Status: COMPLETED | OUTPATIENT
Start: 2024-08-11 | End: 2024-08-11

## 2024-08-11 RX ORDER — DOXYCYCLINE 100 MG/1
100 CAPSULE ORAL EVERY 12 HOURS SCHEDULED
Status: DISCONTINUED | OUTPATIENT
Start: 2024-08-12 | End: 2024-08-14 | Stop reason: HOSPADM

## 2024-08-11 RX ORDER — ACETAMINOPHEN 325 MG/1
650 TABLET ORAL EVERY 4 HOURS PRN
Status: DISCONTINUED | OUTPATIENT
Start: 2024-08-11 | End: 2024-08-14 | Stop reason: HOSPADM

## 2024-08-11 RX ORDER — SIMVASTATIN 40 MG
40 TABLET ORAL AT BEDTIME
Status: ON HOLD | COMMUNITY
End: 2024-08-11 | Stop reason: SINTOL

## 2024-08-11 RX ORDER — ACETAMINOPHEN 325 MG/1
650 TABLET ORAL ONCE
Status: COMPLETED | OUTPATIENT
Start: 2024-08-11 | End: 2024-08-11

## 2024-08-11 RX ORDER — DEXTROSE MONOHYDRATE 25 G/50ML
25-50 INJECTION, SOLUTION INTRAVENOUS
Status: DISCONTINUED | OUTPATIENT
Start: 2024-08-11 | End: 2024-08-14 | Stop reason: HOSPADM

## 2024-08-11 RX ORDER — CYANOCOBALAMIN 1000 UG/ML
1000 INJECTION, SOLUTION INTRAMUSCULAR; SUBCUTANEOUS ONCE
Status: COMPLETED | OUTPATIENT
Start: 2024-08-11 | End: 2024-08-11

## 2024-08-11 RX ORDER — LIDOCAINE 40 MG/G
CREAM TOPICAL
Status: DISCONTINUED | OUTPATIENT
Start: 2024-08-11 | End: 2024-08-11 | Stop reason: SINTOL

## 2024-08-11 RX ORDER — NICOTINE POLACRILEX 4 MG
15-30 LOZENGE BUCCAL
Status: DISCONTINUED | OUTPATIENT
Start: 2024-08-11 | End: 2024-08-14 | Stop reason: HOSPADM

## 2024-08-11 RX ORDER — UBIDECARENONE 100 MG
100 CAPSULE ORAL EVERY MORNING
Status: ON HOLD | COMMUNITY
End: 2024-08-11 | Stop reason: SINTOL

## 2024-08-11 RX ORDER — ONDANSETRON 2 MG/ML
4 INJECTION INTRAMUSCULAR; INTRAVENOUS EVERY 6 HOURS PRN
Status: DISCONTINUED | OUTPATIENT
Start: 2024-08-11 | End: 2024-08-14 | Stop reason: HOSPADM

## 2024-08-11 RX ORDER — CEFAZOLIN SODIUM 1 G/50ML
1250 SOLUTION INTRAVENOUS EVERY 24 HOURS
Status: DISCONTINUED | OUTPATIENT
Start: 2024-08-12 | End: 2024-08-11

## 2024-08-11 RX ORDER — ONDANSETRON 4 MG/1
4 TABLET, ORALLY DISINTEGRATING ORAL EVERY 6 HOURS PRN
Status: DISCONTINUED | OUTPATIENT
Start: 2024-08-11 | End: 2024-08-14 | Stop reason: HOSPADM

## 2024-08-11 RX ORDER — LEVOTHYROXINE SODIUM 100 UG/1
100 TABLET ORAL EVERY MORNING
Status: DISCONTINUED | OUTPATIENT
Start: 2024-08-11 | End: 2024-08-14 | Stop reason: HOSPADM

## 2024-08-11 RX ORDER — POLYETHYLENE GLYCOL 3350 17 G/17G
17 POWDER, FOR SOLUTION ORAL DAILY
Status: DISCONTINUED | OUTPATIENT
Start: 2024-08-11 | End: 2024-08-14 | Stop reason: HOSPADM

## 2024-08-11 RX ORDER — MELOXICAM 15 MG/1
1 TABLET ORAL
Status: ON HOLD | COMMUNITY
Start: 2024-07-16 | End: 2024-08-11 | Stop reason: SINTOL

## 2024-08-11 RX ORDER — PROCHLORPERAZINE 25 MG
25 SUPPOSITORY, RECTAL RECTAL EVERY 12 HOURS PRN
Status: DISCONTINUED | OUTPATIENT
Start: 2024-08-11 | End: 2024-08-11 | Stop reason: SINTOL

## 2024-08-11 RX ORDER — VANCOMYCIN HYDROCHLORIDE 1 G/200ML
1000 INJECTION, SOLUTION INTRAVENOUS EVERY 24 HOURS
Status: DISCONTINUED | OUTPATIENT
Start: 2024-08-12 | End: 2024-08-11

## 2024-08-11 RX ORDER — PROCHLORPERAZINE MALEATE 10 MG
10 TABLET ORAL EVERY 6 HOURS PRN
Status: DISCONTINUED | OUTPATIENT
Start: 2024-08-11 | End: 2024-08-11 | Stop reason: SINTOL

## 2024-08-11 RX ADMIN — SENNOSIDES AND DOCUSATE SODIUM 1 TABLET: 8.6; 5 TABLET ORAL at 10:59

## 2024-08-11 RX ADMIN — PIPERACILLIN AND TAZOBACTAM 3.38 G: 3; .375 INJECTION, POWDER, FOR SOLUTION INTRAVENOUS at 09:21

## 2024-08-11 RX ADMIN — SODIUM CHLORIDE: 4.5 INJECTION, SOLUTION INTRAVENOUS at 14:07

## 2024-08-11 RX ADMIN — SODIUM CHLORIDE 1000 ML: 9 INJECTION, SOLUTION INTRAVENOUS at 01:58

## 2024-08-11 RX ADMIN — PIPERACILLIN AND TAZOBACTAM 3.38 G: 3; .375 INJECTION, POWDER, FOR SOLUTION INTRAVENOUS at 01:58

## 2024-08-11 RX ADMIN — LEVOTHYROXINE SODIUM 100 MCG: 100 TABLET ORAL at 09:29

## 2024-08-11 RX ADMIN — POLYETHYLENE GLYCOL 3350 17 G: 17 POWDER, FOR SOLUTION ORAL at 10:58

## 2024-08-11 RX ADMIN — SODIUM CHLORIDE 1500 MG: 9 INJECTION, SOLUTION INTRAVENOUS at 04:03

## 2024-08-11 RX ADMIN — IOPAMIDOL 90 ML: 755 INJECTION, SOLUTION INTRAVENOUS at 00:32

## 2024-08-11 RX ADMIN — CYANOCOBALAMIN 1000 MCG: 1000 INJECTION, SOLUTION INTRAMUSCULAR; SUBCUTANEOUS at 09:24

## 2024-08-11 RX ADMIN — DOXYCYCLINE 100 MG: 100 CAPSULE ORAL at 14:01

## 2024-08-11 RX ADMIN — ACETAMINOPHEN 650 MG: 325 TABLET ORAL at 04:38

## 2024-08-11 RX ADMIN — PIPERACILLIN AND TAZOBACTAM 3.38 G: 3; .375 INJECTION, POWDER, FOR SOLUTION INTRAVENOUS at 17:28

## 2024-08-11 RX ADMIN — INSULIN DEGLUDEC 10 UNITS: 100 INJECTION, SOLUTION SUBCUTANEOUS at 21:30

## 2024-08-11 RX ADMIN — ACETAMINOPHEN 650 MG: 325 TABLET ORAL at 01:58

## 2024-08-11 RX ADMIN — GADOBUTROL 7 ML: 604.72 INJECTION INTRAVENOUS at 11:41

## 2024-08-11 RX ADMIN — DOXYCYCLINE 100 MG: 100 INJECTION, POWDER, LYOPHILIZED, FOR SOLUTION INTRAVENOUS at 06:24

## 2024-08-11 ASSESSMENT — ACTIVITIES OF DAILY LIVING (ADL)
ADLS_ACUITY_SCORE: 33
ADLS_ACUITY_SCORE: 34
ADLS_ACUITY_SCORE: 33
ADLS_ACUITY_SCORE: 34
ADLS_ACUITY_SCORE: 34
ADLS_ACUITY_SCORE: 37
ADLS_ACUITY_SCORE: 34
ADLS_ACUITY_SCORE: 37
ADLS_ACUITY_SCORE: 33
ADLS_ACUITY_SCORE: 33
ADLS_ACUITY_SCORE: 34
ADLS_ACUITY_SCORE: 37
ADLS_ACUITY_SCORE: 34
ADLS_ACUITY_SCORE: 33
ADLS_ACUITY_SCORE: 34
ADLS_ACUITY_SCORE: 33
ADLS_ACUITY_SCORE: 34
ADLS_ACUITY_SCORE: 33
ADLS_ACUITY_SCORE: 33
ADLS_ACUITY_SCORE: 37

## 2024-08-11 NOTE — ED PROVIDER NOTES
EMERGENCY DEPARTMENT NOTE     Name: Jeannine Rasheed    Age/Sex: 63 year old female   MRN: [id number]   Evaluation Date & Time:  8/10/2024 10:05 PM    PCP:    Carol Chavez   ED Provider: Gustavo Alvarez D.O.       CHIEF COMPLAINT    Flu Symptoms and Abdominal Pain       DIAGNOSIS & DISPOSITION/MEDICAL DECISION MAKING     1. Febrile illness, acute    2. Pancytopenia (H)    3. JESS (acute kidney injury) (H24)        Jeannine Rasheed is a 63 year old female with relevant past history of NIDDM, hyperlipidemia, hypothyroidism who presents to the emergency department for evaluation of fever, chills and weakness.  Patient has had 4 to 5-day history of intermittent shaking chills with fever up to 101 degrees.  Patient denied headache, URI symptoms, sore throat, neck pain or stiffness.  No cough, shortness of breath chest pain, no abdominal pain has been experiencing constipation but no diarrhea.  No nausea or vomiting.  Patient[place] but knows of no tick bites.  Patient has not noted rash and denies any joint swelling or erythema.  All other review of systems were negative.      EMERGENCY DEPARTMENT COURSE   11:04 PM I met with the patient to gather history and to perform my initial exam.  We discussed treatment options and the plan for care while in the Emergency Department.  Pt arrives to triage for fly like symptoms. She endorses chills, generalized weakness, nausea, anorexia, and constipation. She has not had a bowel movement since Monday. Pt is a diabetic and reports her blood sugars have been consistently in the 200's while being sick.           ED Course as of 08/11/24 0232   Sat Aug 10, 2024   2304 Triage vital signs reviewed:  /59 pulse 107 respiratory rate 20 O2 sats 95% temperature 97.5   2305 Differential diagnosis considered include but are not limited to:  Sepsis from multiple sources including pneumonia or intra-abdominal process including cholecystitis,  urinary tract infection including pyelonephritis, endocarditis, tickborne illness including Lyme, babesiosis anaplasmosis, RMSF   2305 Pertinent physical exam findings:  HEENT: Neck supple without meningeal irritation  Cardiac: Regular rate and rhythm S1-S2 without murmur rub  Pulmonary: Lungs are clear to ascultation bilaterally with good breath sounds  Abdomen: Soft nontender, positive bowel sounds.  No organomegaly or mass  Skin: No rash   2305 Pertinent laboratory findings/imaging findings included:  CBC with pancytopenia with hemoglobin 10, WBC 1.4 with ANC 0, platelet count 30,000  AST 84, ALT 55, creatinine 1.2 with GFR 51 decreased over baseline, glucose 158, procalcitonin 1.77, ESR 32,  TSH 0.8 with normal free T41.3    Chest x-ray: No infiltrate  CT abdomen and pelvis: Mild dilation CBD with possible filling distal defect   2306 MDM/ED course:  Patient did develop fever here in the emergency department was treated with Tylenol.  Remained hemodynamically stable except for mild tachycardia.  Laboratory evaluation significant for pancytopenia and JESS.  Definitive source of potential infection is not identified.  There is a possibility of a CBD stone although patient has no abdominal pain and less likely given pancytopenia.  Patient received vancomycin, Zosyn and doxycycline.  Patient will be admitted to [facility] telemetry for further evaluation.  Case was discussed with the hospitalist service.     PROCEDURES:   None  Diagnostic studies:  CT Abdomen Pelvis w Contrast   Final Result   IMPRESSION:    1.  Mild dilatation of the common bile duct with possible small filling defect in the distal aspect of the common bile duct, recommend correlation with liver enzymes and if elevated this can be further evaluated with MRCP to exclude biliary ductal stone.   2.  Lobulated cystic area in the anterior right cardiophrenic fat, could represent pericardial cyst.   3.  Fibroid uterus.      XR Chest 1 View   Final  Result   IMPRESSION: Negative chest.      MR Abdomen MRCP w/o & w Contrast    (Results Pending)     Labs Ordered and Resulted from Time of ED Arrival to Time of ED Departure   BASIC METABOLIC PANEL - Abnormal       Result Value    Sodium 136      Potassium 3.4      Chloride 101      Carbon Dioxide (CO2) 19 (*)     Anion Gap 16 (*)     Urea Nitrogen 25.9 (*)     Creatinine 1.19 (*)     GFR Estimate 51 (*)     Calcium 8.2 (*)     Glucose 158 (*)    HEPATIC FUNCTION PANEL - Abnormal    Protein Total 6.7      Albumin 3.8      Bilirubin Total 0.8      Alkaline Phosphatase 40      AST 84 (*)     ALT 55 (*)     Bilirubin Direct 0.30     PROCALCITONIN - Abnormal    Procalcitonin 1.77 (*)    TSH WITH FREE T4 REFLEX - Abnormal    TSH 0.18 (*)    CRP INFLAMMATION - Abnormal    CRP Inflammation 130.70 (*)    ERYTHROCYTE SEDIMENTATION RATE AUTO - Abnormal    Erythrocyte Sedimentation Rate 32 (*)    CBC WITH PLATELETS AND DIFFERENTIAL - Abnormal    WBC Count 1.4 (*)     RBC Count 4.46      Hemoglobin 10.6 (*)     Hematocrit 34.2 (*)     MCV 77 (*)     MCH 23.8 (*)     MCHC 31.0 (*)     RDW 16.8 (*)     Platelet Count        NRBCs per 100 WBC 0      Absolute NRBCs 0.0     IRON AND IRON BINDING CAPACITY - Abnormal    Iron 18 (*)     Iron Binding Capacity 236 (*)     Iron Sat Index 8 (*)    INFLUENZA A/B, RSV, & SARS-COV2 PCR - Normal    Influenza A PCR Negative      Influenza B PCR Negative      RSV PCR Negative      SARS CoV2 PCR Negative     LACTIC ACID WHOLE BLOOD WITH 1X REPEAT IN 2 HR WHEN >2 - Normal    Lactic Acid, Initial 2.0     T4 FREE - Normal    Free T4 1.32     TICK-BORNE DISEASE PANEL BY PCR   ROUTINE UA WITH MICROSCOPIC REFLEX TO CULTURE   RBC AND PLATELET MORPHOLOGY   FERRITIN   VITAMIN B12   FOLATE   BLOOD CULTURE   BLOOD CULTURE   BLOOD PARASITOLOGY EXAM   MRSA MSSA PCR, NASAL SWAB     ED INTERVENTIONS     Medications   vancomycin (VANCOCIN) 1,500 mg in sodium chloride 0.9 % 250 mL intermittent infusion (has no  "administration in time range)   doxycycline (VIBRAMYCIN) 100 mg vial to attach to  mL bag (has no administration in time range)   sodium chloride 0.9% BOLUS 1,000 mL (1,000 mLs Intravenous $New Bag 8/11/24 0158)   iopamidol (ISOVUE-370) solution 90 mL (90 mLs Intravenous $Given 8/11/24 0032)   piperacillin-tazobactam (ZOSYN) 3.375 g vial to attach to  mL bag (3.375 g Intravenous $New Bag 8/11/24 0158)   acetaminophen (TYLENOL) tablet 650 mg (650 mg Oral $Given 8/11/24 0158)     TOTAL CRITICAL CARE TIME (EXCLUDING PROCEDURES): Not applicable  Discharge Vital Signs:/63   Pulse 103   Temp (!) 101.4  F (38.6  C) (Oral)   Resp 20   Ht 1.549 m (5' 1\")   Wt 65.8 kg (145 lb)   SpO2 93%   BMI 27.40 kg/m       DISCHARGE MEDICATIONS        Review of your medicines        UNREVIEWED medicines. Ask your doctor about these medicines        Dose / Directions   empagliflozin 25 MG Tabs tablet  Commonly known as: JARDIANCE  Ask about: Which instructions should I use?      Dose: 25 mg  Take 25 mg by mouth every morning  Refills: 0     insulin degludec 100 UNIT/ML pen  Commonly known as: TRESIBA  Ask about: Which instructions should I use?      Dose: 10 Units  Inject 10 Units subcutaneously at bedtime  Refills: 0     levothyroxine 100 MCG tablet  Commonly known as: SYNTHROID/LEVOTHROID  Ask about: Which instructions should I use?      Dose: 100 mcg  Take 100 mcg by mouth every morning  Refills: 0     meloxicam 15 MG tablet  Commonly known as: MOBIC      Dose: 1 tablet  Take 1 tablet by mouth daily at 2 pm  Refills: 0     metFORMIN 1000 MG tablet  Commonly known as: GLUCOPHAGE  Ask about: Which instructions should I use?      Dose: 1,000 mg  Take 1,000 mg by mouth 2 times daily (with meals)  Refills: 0     Semaglutide (1 MG/DOSE) 4 MG/3ML pen  Commonly known as: OZEMPIC  Ask about: Which instructions should I use?      Dose: 1 mg  Inject 1 mg subcutaneously every 7 days  Refills: 0     simvastatin 40 MG " tablet  Commonly known as: ZOCOR  Ask about: Which instructions should I use?      Dose: 40 mg  Take 40 mg by mouth at bedtime  Refills: 0            CONTINUE these medicines which have NOT CHANGED        Dose / Directions   BD Pen Needle Katty 2nd Gen 32G X 4 MM miscellaneous  Used for: Type 2 diabetes mellitus without complication, without long-term current use of insulin (H)  Generic drug: insulin pen needle      USE 1 PEN NEEDLE DAILY AS DIRECTED  Quantity: 100 each  Refills: 2     co-enzyme Q-10 100 MG Caps capsule      Dose: 100 mg  Take 100 mg by mouth every morning  Refills: 0     OneTouch Ultra test strip  Used for: Type 2 diabetes mellitus without complication, without long-term current use of insulin (H)  Generic drug: blood glucose      USE TO TEST BLOOD SUGAR THREE TIMES A DAY OR AS DIRECTED  Quantity: 300 strip  Refills: 3            DISPOSITION: Admit to [facility]      History:  Supplemental history from: Patient's   External Record(s) reviewed: Primary care office visit where she was diagnosed with possible URI and constipation.  COVID 19 PCR negative    Work Up:  Chart documentation includes differential considered and any EKGs or imaging independently interpreted by provider, where specified.  In additional to work up documented, I considered the following work up: Right upper quadrant ultrasound or MRCP deferred after discussion with hospitalist    External consultation:  Discussion of management with another provider: Hospitalist    Complicating factors:  Care impacted by chronic illness: Diabetes  Care affected by social determinants of health: Access to Medical Care    Disposition considerations: Admit.    At the conclusion of the encounter I discussed the results of all of the tests and the disposition. The questions were answered. The patient or family acknowledged understanding and was agreeable with the care plan.            INFORMATION SOURCE AND LIMITATIONS     History/Exam limitations: None  Patient information was obtained from: Patient and her   Use of : N/A    HISTORY OF PRESENT ILLNESS   Jeannine Rasheed is a 63 year old female with relevant past history of NIDDM, hyperlipidemia, hypothyroidism who presents to the emergency department for evaluation of fever, chills and weakness.  Patient has had 4 to 5-day history of intermittent shaking chills with fever up to 101 degrees.  Patient denied headache, URI symptoms, sore throat, neck pain or stiffness.  No cough, shortness of breath chest pain, no abdominal pain has been experiencing constipation but no diarrhea.  No nausea or vomiting.  Patient's only recent travel [place] but knows of no tick bites.  Patient has not noted rash and denies any joint swelling or erythema.  Patient was concerned that her blood sugars have been trending higher than they usually are with highest level 200. all other review of systems were negative.    REVIEW OF SYSTEMS:   All other systems reviewed and are negative except as noted above in HPI.    PATIENT HISTORY     Past Medical History:   Diagnosis Date     Diabetes (H) 1997     Thyroid disease      Patient Active Problem List   Diagnosis     Hypothyroidism     Mixed hyperlipidemia     Type 2 diabetes mellitus with hyperglycemia, with long-term current use of insulin (H)     Diverticular disease of colon     Chronic right shoulder pain     Polyp of colon     Left wrist pain     JESS (acute kidney injury) (H24)     Febrile illness, acute     Pancytopenia (H)     Past Surgical History:   Procedure Laterality Date     ANKLE FRACTURE SURGERY Right 2005     COLONOSCOPY  2013     HC CORRECT BUNION,DOUBLE OSTEOTOMY      Description: Hallux Valgus (Bunion) Correction;  Recorded: 10/29/2013;  Comments: age 13     HC REVISE MEDIAN N/CARPAL TUNNEL SURG      Description: Neuroplasty Decompression Median Nerve At Carpal Tunnel;  Recorded: 10/26/2010;     SOFT TISSUE  SURGERY  trigger finger and carpal tunnel       No Known Allergies    OUTPATIENT MEDICATIONS     New Prescriptions    No medications on file      Vitals:    08/10/24 2245 08/10/24 2300 08/10/24 2315 08/11/24 0130   BP: 114/55 115/59 117/57 132/63   Pulse: 94 93 96 103   Resp:    20   Temp:    (!) 101.4  F (38.6  C)   TempSrc:    Oral   SpO2: 92% 95% 97% 93%   Weight:       Height:           Physical Exam   Constitutional: Oriented to person, place, and time. Appears well-developed and well-nourished.   HEENT:    Neck: Normal range of motion. Neck supple without meningeal irritation  Cardiovascular: Normal rate, regular rhythm and normal heart sounds.    Pulmonary/Chest: Normal effort  and breath sounds normal.   Abdominal: Soft. Bowel sounds are normal.   Musculoskeletal: Normal range of motion.  No joint swelling or erythema  Neurological: Alert and oriented to person, place, and time. Normal strength. No sensory deficit. No cranial nerve deficit.  Skin: Skin is warm and dry.  No rash noted  Psychiatric: Normal mood and affect. Behavior is normal. Thought content normal.     DIAGNOSTICS    LABORATORY FINDINGS (REVIEWED AND INTERPRETED):  Labs Ordered and Resulted from Time of ED Arrival to Time of ED Departure   BASIC METABOLIC PANEL - Abnormal       Result Value    Sodium 136      Potassium 3.4      Chloride 101      Carbon Dioxide (CO2) 19 (*)     Anion Gap 16 (*)     Urea Nitrogen 25.9 (*)     Creatinine 1.19 (*)     GFR Estimate 51 (*)     Calcium 8.2 (*)     Glucose 158 (*)    HEPATIC FUNCTION PANEL - Abnormal    Protein Total 6.7      Albumin 3.8      Bilirubin Total 0.8      Alkaline Phosphatase 40      AST 84 (*)     ALT 55 (*)     Bilirubin Direct 0.30     PROCALCITONIN - Abnormal    Procalcitonin 1.77 (*)    TSH WITH FREE T4 REFLEX - Abnormal    TSH 0.18 (*)    CRP INFLAMMATION - Abnormal    CRP Inflammation 130.70 (*)    ERYTHROCYTE SEDIMENTATION RATE AUTO - Abnormal    Erythrocyte Sedimentation Rate 32  (*)    CBC WITH PLATELETS AND DIFFERENTIAL - Abnormal    WBC Count 1.4 (*)     RBC Count 4.46      Hemoglobin 10.6 (*)     Hematocrit 34.2 (*)     MCV 77 (*)     MCH 23.8 (*)     MCHC 31.0 (*)     RDW 16.8 (*)     Platelet Count        NRBCs per 100 WBC 0      Absolute NRBCs 0.0     IRON AND IRON BINDING CAPACITY - Abnormal    Iron 18 (*)     Iron Binding Capacity 236 (*)     Iron Sat Index 8 (*)    INFLUENZA A/B, RSV, & SARS-COV2 PCR - Normal    Influenza A PCR Negative      Influenza B PCR Negative      RSV PCR Negative      SARS CoV2 PCR Negative     LACTIC ACID WHOLE BLOOD WITH 1X REPEAT IN 2 HR WHEN >2 - Normal    Lactic Acid, Initial 2.0     T4 FREE - Normal    Free T4 1.32     TICK-BORNE DISEASE PANEL BY PCR   ROUTINE UA WITH MICROSCOPIC REFLEX TO CULTURE   RBC AND PLATELET MORPHOLOGY   FERRITIN   VITAMIN B12   FOLATE   BLOOD CULTURE   BLOOD CULTURE   BLOOD PARASITOLOGY EXAM   MRSA MSSA PCR, NASAL SWAB         IMAGING (REVIEWED AND INTERPRETED):  CT Abdomen Pelvis w Contrast   Final Result   IMPRESSION:    1.  Mild dilatation of the common bile duct with possible small filling defect in the distal aspect of the common bile duct, recommend correlation with liver enzymes and if elevated this can be further evaluated with MRCP to exclude biliary ductal stone.   2.  Lobulated cystic area in the anterior right cardiophrenic fat, could represent pericardial cyst.   3.  Fibroid uterus.      XR Chest 1 View   Final Result   IMPRESSION: Negative chest.      MR Abdomen MRCP w/o & w Contrast    (Results Pending)           Gustavo Alvarez D.O.  EMERGENCY MEDICINE   08/10/24  [facility] EMERGENCY DEPARTMENT  [address] [place] [id number] [phone]  Dept: [phone]     Gustavo Alvarez DO  08/11/24 0232

## 2024-08-11 NOTE — PHARMACY-VANCOMYCIN DOSING SERVICE
"Pharmacy Vancomycin Initial Note  Date of Service 2024  Patient's  1960  63 year old, female    Indication: Febrile Neutropenia    Current estimated CrCl = Estimated Creatinine Clearance: 42.6 mL/min (A) (based on SCr of 1.19 mg/dL (H)).    Creatinine for last 3 days  8/10/2024: 11:06 PM Creatinine 1.19 mg/dL    Recent Vancomycin Level(s) for last 3 days  No results found for requested labs within last 3 days.      Vancomycin IV Administrations (past 72 hours)                     vancomycin (VANCOCIN) 1,500 mg in sodium chloride 0.9 % 250 mL intermittent infusion (mg) 1,500 mg New Bag 24 0403                    Nephrotoxins and other renal medications (From now, onward)      Start     Dose/Rate Route Frequency Ordered Stop    24 0600  vancomycin (VANCOCIN) 1,000 mg in 200 mL dextrose intermittent infusion         1,000 mg  200 mL/hr over 1 Hours Intravenous EVERY 24 HOURS 24 0407      24 0800  piperacillin-tazobactam (ZOSYN) 3.375 g vial to attach to  mL bag        Note to Pharmacy: For [facility], [facility] and [facility]: For Zosyn-naive patients, use the \"Zosyn initial dose + extended infusion\" order panel.    3.375 g  over 240 Minutes Intravenous EVERY 8 HOURS 24 0342      24 0200  vancomycin (VANCOCIN) 1,500 mg in sodium chloride 0.9 % 250 mL intermittent infusion         1,500 mg  over 90 Minutes Intravenous ONCE 24 0129              Contrast Orders - past 72 hours (72h ago, onward)      Start     Dose/Rate Route Frequency Stop    24 0100  iopamidol (ISOVUE-370) solution 90 mL         90 mL Intravenous ONCE 24 0032            [employer] Prediction of Planned Initial Vancomycin Regimen    Loading dose: 1500 mg at 04:00 2024.  Regimen: 1000 mg IV every 24 hours.  Start time: 04:00 on 2024  Exposure target: AUC24 (range)400-600 mg/L.hr   AUC24,ss: 436 mg/L.hr  Probability of AUC24 > 400: 60 %  Ctrough,ss: 12.9 mg/L  Probability of Ctrough,ss > " 20: 16 %  Probability of nephrotoxicity (Lodise MARITZA 2009): 8 %        Plan:  Start vancomycin  1000 mg IV q24h.   Vancomycin monitoring method: AUC  Vancomycin therapeutic monitoring goal: 400-600 mg*h/L  Pharmacy will check vancomycin levels as appropriate in 1-3 Days.    Serum creatinine levels will be ordered daily for the first week of therapy and at least twice weekly for subsequent weeks.      Ana Reno, ZakD

## 2024-08-11 NOTE — PLAN OF CARE
Goal Outcome Evaluation: Pt alert and oriented, reported headache, and chill, critical labs of positive Anaplasma and preliminary platelet of 23.  Notified, order in place, pt reported last BM on the 5th of August, PRN senna and Miralax given, pt started on doxycycline, MRI done , tylenol given for tempt of 100.8, pt ate 100% of breakfast, pt can make hr needs known.             Problem: Adult Inpatient Plan of Care  Goal: Absence of Hospital-Acquired Illness or Injury  Outcome: Progressing     Problem: Adult Inpatient Plan of Care  Goal: Optimal Comfort and Wellbeing  Outcome: Progressing

## 2024-08-11 NOTE — ED TRIAGE NOTES
Pt arrives to triage for fly like symptoms. She endorses chills, generalized weakness, nausea, anorexia, and constipation. She has not had a bowel movement since Monday. Pt is a diabetic and reports her blood sugars have been consistently in the 200's while being sick.

## 2024-08-11 NOTE — ED NOTES
"[facility] ED Handoff Report    ED Chief Complaint:   Flu symtpoms. Chills, poor appetite  ED Diagnosis:  (R50.9) Febrile illness, acute  Comment: na  Plan: admit    (D61.818) Pancytopenia (H)  Comment: na  Plan: admit, MRI at 0600    (N17.9) JESS (acute kidney injury) (H24)  Comment: admit  Plan: admit, momitor       PMH:    Past Medical History:   Diagnosis Date    Diabetes (H) 1997    Thyroid disease         Code Status:  No Order     Falls Risk: No Band: Not applicable    Current Living Situation/Residence: lives with a significant other     Elimination Status: Continent: Yes     Activity Level: SBA    Patients Preferred Language:  English     Needed: No    Vital Signs:  /63   Pulse 103   Temp (!) 101.4  F (38.6  C) (Oral)   Resp 20   Ht 1.549 m (5' 1\")   Wt 65.8 kg (145 lb)   SpO2 93%   BMI 27.40 kg/m       Cardiac Rhythm: NA      Pain Score: 0/10    Is the Patient Confused:  No    Last Food or Drink: 08/11/24 at 0000    Focused Assessment:    General medical    Tests Performed: Done: Labs and Imaging    Treatments Provided:  fluids, antibiotics, Tylenol    Family Dynamics/Concerns: No    Family Updated On Visitor Policy: Yes    Plan of Care Communicated to Family: Yes    Who Was Updated about Plan of Care:     Belongings Checklist Done and Signed by Patient: No    Belongings Sent with Patient: yest, clothes, cell phone      Medications sent with patient: no      Covid: symptomatic, negative    Additional Information: Complaint is flu symptoms, chills, poor appetite, fever. Being treated with fluids, iV antibiotics, Tylenol. Had CT of ABD, small \"filling defect\". Plan: MRI after 0600. Fever of 101.4 being treated with Tylenol. 18G LAC. VSS, A&O, SBA when ambulating. We need a urine sample on her.    RN: Ken Hassan RN     8/11/2024 2:23 AM       "

## 2024-08-11 NOTE — PLAN OF CARE
Problem: Adult Inpatient Plan of Care  Goal: Absence of Hospital-Acquired Illness or Injury  Intervention: Prevent Skin Injury  Recent Flowsheet Documentation  Taken 8/11/2024 0424 by Denis Denise RN  Body Position: position changed independently  Taken 8/11/2024 0254 by Denis Denise RN  Body Position: position changed independently     Problem: Adult Inpatient Plan of Care  Goal: Absence of Hospital-Acquired Illness or Injury  Intervention: Identify and Manage Fall Risk  Recent Flowsheet Documentation  Taken 8/11/2024 0254 by Denis Denise RN  Safety Promotion/Fall Prevention: activity supervised  Intervention: Prevent Skin Injury  Recent Flowsheet Documentation  Taken 8/11/2024 0424 by Denis Denise RN  Body Position: position changed independently  Taken 8/11/2024 0254 by Denis Denise RN  Body Position: position changed independently   Goal Outcome Evaluation:        Pt A/O X 4, headache 2/10 with elevated Temp 100.5 Tylenol given.  Schedule Abx given ;  and 86. Pt was place on 2 L via NC, overnight for low O2 SATS.

## 2024-08-11 NOTE — PROGRESS NOTES
Addendum to Progress Note dated 08/11/2024      Study Result    Narrative & Impression   EXAM: MR ABDOMEN MRCP W/O and W CONTRAST  LOCATION: [facility]  DATE: 8/11/2024     INDICATION: Elevated liver enzymes, abdominal pain and mild dilatation of the common bile duct with apparent distal CBD filling defect at today's earlier CT.  COMPARISON: CT AP 8/11/2024 12:37 AM  TECHNIQUE: Routine MR liver/pancreas protocol including axial and coronal MRCP sequences. 2D and 3D reconstruction performed by MR technologist including MIP reconstruction and slab cholangiograms. If performed with contrast, additional dynamic T1 post   IV contrast images.  CONTRAST: 7ml Gadavist      FINDINGS:      MRCP: Normal caliber intra and extrahepatic bile ducts with no stone, sludge, stricture or mass. Normal caliber pancreatic duct with classic anatomy. Gallbladder content with mild increased T1 signal (concentrated bile versus sludge) and minimal uniform   mural edema but no discrete stones or significant inflammatory change.     LIVER: Moderate diffuse hepatic steatosis. Liver is otherwise normal.     PANCREAS: Normal.     ADDITIONAL FINDINGS: No significant abnormality in the spleen, kidneys, and adrenal glands. No adenopathy. No ascites.                                                                      IMPRESSION:  1.  Normal caliber intra and extrahepatic bile ducts with no stone, sludge, stricture or mass.   2.  Gallbladder content with mild increased T1 signal (concentrated bile versus sludge) and minimal uniform mural edema but no discrete stones or significant inflammatory change.  3.  Moderate diffuse hepatic steatosis.        Contains abnormal data Tick-Borne Disease Panel by PCR  Order: [id number]  Status: Final result       Visible to patient: Yes (not seen)    0 Result Notes         Component  Ref Range & Units 1 d ago    Anaplasma phagocytophilum by PCR  Not Detected Detected Abnormal     Babesia  species by PCR  Not Detected Not Detected    Ehrlichia species by PCR  Not Detected Not Detected   Resulting Agency [facility]                Was informed of the patient's tickborne disease panel was positive for Anaplasma.  At the recommendation of infectious disease team, I added doxycycline 100 mg twice daily first dose now.  Patient's abdominal MRI did not reveal malignancies that would explain the pancytopenia.      Noah Meade, DO

## 2024-08-11 NOTE — MEDICATION SCRIBE - ADMISSION MEDICATION HISTORY
Medication Scribe Admission Medication History    Admission medication history is complete. The information provided in this note is only as accurate as the sources available at the time of the update.    Information Source(s): Patient and CareEverywhere/SureScripts via in-person    Pertinent Information: pt manages her own medications.  Tresiba 100unit/mL  -pt reports 10 units HS, pt took her HS dose 8/10/24 @1900  Ozempic  -pt reports 1mg per weekly dose, dose taken every Sunday.    Changes made to PTA medication list:  Added: Meloxicam 15mg,  Deleted: Acyclovir, duplicate test strip,  Changed: Tresiba to 10 units HS    Allergies reviewed with patient and updates made in EHR: yes    Medication History Completed By: Kris العراقي 8/11/2024 2:21 AM    PTA Med List   Medication Sig Last Dose    co-enzyme Q-10 100 MG CAPS capsule Take 100 mg by mouth every morning 8/10/2024 at AM    empagliflozin (JARDIANCE) 25 MG TABS tablet Take 25 mg by mouth every morning 8/10/2024 at AM    insulin degludec (TRESIBA) 100 UNIT/ML pen Inject 10 Units subcutaneously at bedtime 8/10/2024 at 1900    insulin pen needle (BD PEN NEEDLE GEORGIE 2ND GEN) 32G X 4 MM miscellaneous USE 1 PEN NEEDLE DAILY AS DIRECTED 8/10/2024 at 1900    levothyroxine (SYNTHROID/LEVOTHROID) 100 MCG tablet Take 100 mcg by mouth every morning 8/10/2024 at AM    meloxicam (MOBIC) 15 MG tablet Take 1 tablet by mouth daily at 2 pm 8/10/2024 at afternoon    metFORMIN (GLUCOPHAGE) 1000 MG tablet Take 1,000 mg by mouth 2 times daily (with meals) 8/10/2024 at DINNER    ONETOUCH ULTRA test strip USE TO TEST BLOOD SUGAR THREE TIMES A DAY OR AS DIRECTED 8/10/2024 at as directed    Semaglutide, 1 MG/DOSE, (OZEMPIC) 4 MG/3ML pen Inject 1 mg subcutaneously every 7 days 8/4/2024 at AM 1mg    simvastatin (ZOCOR) 40 MG tablet Take 40 mg by mouth at bedtime 8/10/2024 at HS

## 2024-08-11 NOTE — CONSULTS
[facility] Hematology and Oncology Inpatient Consult Note    Patient: Jeannine Rasheed  MRN: [id number]  Date of Service: 8/11/2024      Reason for Visit    I was consulted by Lottie Quzeada MD  regarding pancytopenia    Assessment/Plan      #.  Pancytopenia (mild neutropenia, mild to moderate microcytic anemia and severe thrombocytopenia), new onset  #.  Acute febrile illness likely tickborne illness  #.  Low vitamin B12   #.  Elevated LFTs  #.  Mild creatinine elevation, improving  #.  Hypothyroidism on thyroid supplement       She has baseline fairly unremarkable CBC until 9 months ago.  She may have history of intermittent iron deficiency anemia based on microcytic anemia about 4 5 years ago.  However more recently, she has normal hemoglobin with normal MCV.  Iron study suggested anemia of inflammation and not clearly consistent with iron deficiency anemia although will need to be repeated once this acute illness is over.  Vitamin B12 is markedly low.  Current finding of pancytopenia is most likely related to acute febrile illness.  She has a few nucleated RBCs suggesting leukoerythroblastic picture from acute illness.  No blasts noted.  I agree with supplementing vitamin B12.    Will continue to observe her blood counts and expect recovery.  If her blood counts are not recovered as expected, we will proceed with bone marrow biopsy.     ______________________________________________________________________________      History  Ms. Jeannine Rasheed is a very pleasant 63 year old female presented with acute febrile illness with high-grade intermittent fever, myalgia, chills.  She does not have any chronic major medical issues she was on a hiking trip to [place] recently.  She has no history of mild iron deficiency anemia but it was corrected with dietary modification.  She does not have known history of vitamin B12 deficiency.  She does not have diet restriction.     Review of  systems.  Apart from describing in history, the remainder of comprehensive ROS was negative.      Past History  Past Medical History:   Diagnosis Date    Diabetes (H) 1997    Thyroid disease      Past Surgical History:   Procedure Laterality Date    ANKLE FRACTURE SURGERY Right 2005    COLONOSCOPY  2013    HC CORRECT BUNION,DOUBLE OSTEOTOMY      Description: Hallux Valgus (Bunion) Correction;  Recorded: 10/29/2013;  Comments: age 13    HC REVISE MEDIAN N/CARPAL TUNNEL SURG      Description: Neuroplasty Decompression Median Nerve At Carpal Tunnel;  Recorded: 10/26/2010;    SOFT TISSUE SURGERY  trigger finger and carpal tunnel     Family History   Problem Relation Age of Onset    Cancer Mother         lymphoma    Cerebrovascular Disease Mother 69.00    Hyperlipidemia Mother     Osteoporosis Mother     Heart Disease Father         bypass at 55    Hyperlipidemia Father     Heart Disease Brother         CAD s/p stents    Diabetes Brother     Coronary Artery Disease Brother     Cancer Maternal Grandmother         cervical    Heart Disease Paternal Grandmother     Breast Cancer No family hx of     Colon Cancer No family hx of     Diabetes No family hx of      Social History     Socioeconomic History    Marital status:    Tobacco Use    Smoking status: Never    Smokeless tobacco: Never   Vaping Use    Vaping status: Never Used   Substance and Sexual Activity    Alcohol use: Not Currently     Comment: occasional    Drug use: No    Sexual activity: Yes     Partners: Male     Birth control/protection: Post-menopausal     Comment:  Isaias   Other Topics Concern    Parent/sibling w/ CABG, MI or angioplasty before 65F 55M? Yes     Comment: triple buypass father     Social Determinants of Health     Financial Resource Strain: Low Risk  (11/14/2023)    Financial Resource Strain     Within the past 12 months, have you or your family members you live with been unable to get utilities (heat, electricity) when it was really  "needed?: No   Food Insecurity: Low Risk  (11/14/2023)    Food Insecurity     Within the past 12 months, did you worry that your food would run out before you got money to buy more?: No     Within the past 12 months, did the food you bought just not last and you didn t have money to get more?: No   Transportation Needs: Low Risk  (11/14/2023)    Transportation Needs     Within the past 12 months, has lack of transportation kept you from medical appointments, getting your medicines, non-medical meetings or appointments, work, or from getting things that you need?: No   Interpersonal Safety: Low Risk  (6/4/2024)    Interpersonal Safety     Do you feel physically and emotionally safe where you currently live?: Yes     Within the past 12 months, have you been hit, slapped, kicked or otherwise physically hurt by someone?: No     Within the past 12 months, have you been humiliated or emotionally abused in other ways by your partner or ex-partner?: No   Housing Stability: Low Risk  (11/14/2023)    Housing Stability     Do you have housing? : Yes     Are you worried about losing your housing?: No       Allergies    No Known Allergies       Physical Exam    /64 (BP Location: Left arm)   Pulse 101   Temp 100.4  F (38  C) (Oral)   Resp 20   Ht 1.549 m (5' 1\")   Wt 65.8 kg (145 lb)   SpO2 93%   BMI 27.40 kg/m        General: alert, awake, not in acute distress  HEENT: Head: Normal, normocephalic, atraumatic.  Eye: Normal external eye, conjunctiva, lids cornea, REY.  Nose: Normal external nose, mucus membranes and septum.  Pharynx: Normal buccal mucosa. Normal pharynx.  Neck / Thyroid: Supple, no masses, nodes, nodules or enlargement.  Lymphatics: No abnormally enlarged lymph nodes.  Chest: Normal chest wall and respirations. Clear to auscultation.  Heart: S1 S2 RRR, no murmur.   Abdomen: abdomen is soft without significant tenderness, masses, organomegaly or guarding  Extremities: normal strength, tone, and muscle " mass  Skin: normal. no rash or abnormalities  CNS: non focal.      Lab Results  Recent Results (from the past 24 hour(s))   Symptomatic Influenza A/B, RSV, & SARS-CoV2 PCR (COVID-19) Nasopharyngeal    Collection Time: 08/10/24 10:13 PM    Specimen: Nasopharyngeal; Swab   Result Value Ref Range    Influenza A PCR Negative Negative    Influenza B PCR Negative Negative    RSV PCR Negative Negative    SARS CoV2 PCR Negative Negative   Basic metabolic panel    Collection Time: 08/10/24 11:06 PM   Result Value Ref Range    Sodium 136 135 - 145 mmol/L    Potassium 3.4 3.4 - 5.3 mmol/L    Chloride 101 98 - 107 mmol/L    Carbon Dioxide (CO2) 19 (L) 22 - 29 mmol/L    Anion Gap 16 (H) 7 - 15 mmol/L    Urea Nitrogen 25.9 (H) 8.0 - 23.0 mg/dL    Creatinine 1.19 (H) 0.51 - 0.95 mg/dL    GFR Estimate 51 (L) >60 mL/min/1.73m2    Calcium 8.2 (L) 8.8 - 10.4 mg/dL    Glucose 158 (H) 70 - 99 mg/dL   Hepatic function panel    Collection Time: 08/10/24 11:06 PM   Result Value Ref Range    Protein Total 6.7 6.4 - 8.3 g/dL    Albumin 3.8 3.5 - 5.2 g/dL    Bilirubin Total 0.8 <=1.2 mg/dL    Alkaline Phosphatase 40 40 - 150 U/L    AST 84 (H) 0 - 45 U/L    ALT 55 (H) 0 - 50 U/L    Bilirubin Direct 0.30 0.00 - 0.30 mg/dL   Lactic acid whole blood with 1x repeat in 2 hr when >2    Collection Time: 08/10/24 11:06 PM   Result Value Ref Range    Lactic Acid, Initial 2.0 0.7 - 2.0 mmol/L   Procalcitonin    Collection Time: 08/10/24 11:06 PM   Result Value Ref Range    Procalcitonin 1.77 (H) <0.50 ng/mL   TSH with free T4 reflex    Collection Time: 08/10/24 11:06 PM   Result Value Ref Range    TSH 0.18 (L) 0.30 - 4.20 uIU/mL   CRP inflammation    Collection Time: 08/10/24 11:06 PM   Result Value Ref Range    CRP Inflammation 130.70 (H) <5.00 mg/L   Erythrocyte sedimentation rate auto    Collection Time: 08/10/24 11:06 PM   Result Value Ref Range    Erythrocyte Sedimentation Rate 32 (H) 0 - 30 mm/hr   Tick-Borne Disease Panel by PCR    Collection  Time: 08/10/24 11:06 PM   Result Value Ref Range    Anaplasma phagocytophilum by PCR Detected (A) Not Detected    Babesia species by PCR Not Detected Not Detected    Ehrlichia species by PCR Not Detected Not Detected   Blood Culture Peripheral Blood    Collection Time: 08/10/24 11:06 PM    Specimen: Peripheral Blood   Result Value Ref Range    Culture No growth after 12 hours    CBC with platelets and differential    Collection Time: 08/10/24 11:06 PM   Result Value Ref Range    WBC Count 1.4 (L) 4.0 - 11.0 10e3/uL    RBC Count 4.46 3.80 - 5.20 10e6/uL    Hemoglobin 10.6 (L) 11.7 - 15.7 g/dL    Hematocrit 34.2 (L) 35.0 - 47.0 %    MCV 77 (L) 78 - 100 fL    MCH 23.8 (L) 26.5 - 33.0 pg    MCHC 31.0 (L) 31.5 - 36.5 g/dL    RDW 16.8 (H) 10.0 - 15.0 %    Platelet Count      NRBCs per 100 WBC 0 <1 /100    Absolute NRBCs 0.0 10e3/uL   T4 free    Collection Time: 08/10/24 11:06 PM   Result Value Ref Range    Free T4 1.32 0.90 - 1.70 ng/dL   Iron and iron binding capacity    Collection Time: 08/10/24 11:06 PM   Result Value Ref Range    Iron 18 (L) 37 - 145 ug/dL    Iron Binding Capacity 236 (L) 240 - 430 ug/dL    Iron Sat Index 8 (L) 15 - 46 %   Ferritin    Collection Time: 08/10/24 11:06 PM   Result Value Ref Range    Ferritin 1,669 (H) 11 - 328 ng/mL   Extra Purple Top Tube    Collection Time: 08/10/24 11:06 PM   Result Value Ref Range    Hold Specimen JI    Vitamin B12    Collection Time: 08/10/24 11:06 PM   Result Value Ref Range    Vitamin B12 <150 (L) 232 - 1,245 pg/mL   MRSA MSSA PCR, Nasal Swab    Collection Time: 08/11/24  2:08 AM    Specimen: Nares, Bilateral; Swab   Result Value Ref Range    MRSA Target DNA Negative Negative    SA Target DNA Negative    Folate    Collection Time: 08/11/24  2:30 AM   Result Value Ref Range    Folic Acid 29.5 4.6 - 34.8 ng/mL   Glucose by meter    Collection Time: 08/11/24  4:47 AM   Result Value Ref Range    GLUCOSE BY METER POCT 112 (H) 70 - 99 mg/dL   Glucose by meter     Collection Time: 08/11/24  6:38 AM   Result Value Ref Range    GLUCOSE BY METER POCT 86 70 - 99 mg/dL   Comprehensive metabolic panel    Collection Time: 08/11/24  7:57 AM   Result Value Ref Range    Sodium 133 (L) 135 - 145 mmol/L    Potassium 3.4 3.4 - 5.3 mmol/L    Carbon Dioxide (CO2) 18 (L) 22 - 29 mmol/L    Anion Gap 11 7 - 15 mmol/L    Urea Nitrogen 22.8 8.0 - 23.0 mg/dL    Creatinine 1.00 (H) 0.51 - 0.95 mg/dL    GFR Estimate 63 >60 mL/min/1.73m2    Calcium 7.3 (L) 8.8 - 10.4 mg/dL    Chloride 104 98 - 107 mmol/L    Glucose 91 70 - 99 mg/dL    Alkaline Phosphatase 37 (L) 40 - 150 U/L    AST 87 (H) 0 - 45 U/L    ALT 51 (H) 0 - 50 U/L    Protein Total 5.8 (L) 6.4 - 8.3 g/dL    Albumin 3.1 (L) 3.5 - 5.2 g/dL    Bilirubin Total 0.7 <=1.2 mg/dL   CBC with platelets and differential    Collection Time: 08/11/24  7:57 AM   Result Value Ref Range    WBC Count 1.1 (L) 4.0 - 11.0 10e3/uL    RBC Count 4.07 3.80 - 5.20 10e6/uL    Hemoglobin 9.8 (L) 11.7 - 15.7 g/dL    Hematocrit 31.2 (L) 35.0 - 47.0 %    MCV 77 (L) 78 - 100 fL    MCH 24.1 (L) 26.5 - 33.0 pg    MCHC 31.4 (L) 31.5 - 36.5 g/dL    RDW 17.0 (H) 10.0 - 15.0 %    Platelet Count 23 (LL) 150 - 450 10e3/uL    % Neutrophils 82 %    % Lymphocytes 10 %    % Monocytes 8 %    % Eosinophils 0 %    % Basophils 0 %    % Immature Granulocytes 0 %    NRBCs per 100 WBC 5 (H) <1 /100    Absolute Neutrophils 0.9 (L) 1.6 - 8.3 10e3/uL    Absolute Lymphocytes 0.1 (L) 0.8 - 5.3 10e3/uL    Absolute Monocytes 0.1 0.0 - 1.3 10e3/uL    Absolute Eosinophils 0.0 0.0 - 0.7 10e3/uL    Absolute Basophils 0.0 0.0 - 0.2 10e3/uL    Absolute Immature Granulocytes 0.0 <=0.4 10e3/uL    Absolute NRBCs 0.1 10e3/uL   RBC and Platelet Morphology    Collection Time: 08/11/24  7:57 AM   Result Value Ref Range    RBC Morphology Confirmed RBC Indices     Platelet Assessment  Automated Count Confirmed. Platelet morphology is normal.     Automated Count Confirmed. Platelet morphology is normal.     Elliptocytes Slight (A) None Seen    Polychromasia Slight (A) None Seen   Adult Type and Screen    Collection Time: 08/11/24  7:57 AM   Result Value Ref Range    SPECIMEN EXPIRATION DATE [id number]    Glucose by meter    Collection Time: 08/11/24  8:22 AM   Result Value Ref Range    GLUCOSE BY METER POCT 79 70 - 99 mg/dL   Glucose by meter    Collection Time: 08/11/24 12:27 PM   Result Value Ref Range    GLUCOSE BY METER POCT 118 (H) 70 - 99 mg/dL        Imaging Results    CT Abdomen Pelvis w Contrast    Result Date: 8/11/2024  EXAM: CT ABDOMEN PELVIS W CONTRAST LOCATION: [facility] DATE: 8/11/2024 INDICATION: Abdominal pain non localized, hx fever. COMPARISON: None available. TECHNIQUE: CT scan of the abdomen and pelvis was performed after the injection of Isovue 370 90 mL intravenously. Multiplanar reformats were obtained. Dose reduction techniques were used. FINDINGS: LOWER CHEST: Lobulated cystic area in the anterior right cardiophrenic fat, could represent pericardial cyst. ABDOMEN/PELVIS: HEPATOBILIARY: No suspicious focal hepatic lesion. No radiodense gallstones. Nodular thickening of the gallbladder fundus, can be seen with gallbladder adenomyomatosis. Mild dilatation of the common bile duct with apparent small filling defect in the distal aspect of the common bile duct (series 3 image 86), could represent biliary ductal stone. PANCREAS: No main pancreatic ductal dilatation or definite solid pancreatic mass. SPLEEN: No splenomegaly. ADRENAL GLANDS: No adrenal nodules. KIDNEYS/BLADDER: No radiodense kidney/ureteral stones or hydronephrosis in either kidney. BOWEL: No abnormally dilated bowel loops. Moderate amount of stool in the colon. PERITONEUM: No evidence of free fluid in the abdomen and pelvis. No free peritoneal or portal venous gas. PELVIC ORGANS: Fibroid uterus. VASCULATURE: Moderate atherosclerotic vascular calcification of the abdominal aorta and iliac arteries. LYMPH  NODES: No significant abdominopelvic lymphadenopathy. MUSCULOSKELETAL: No suspicious osseous lesion.     IMPRESSION: 1.  Mild dilatation of the common bile duct with possible small filling defect in the distal aspect of the common bile duct, recommend correlation with liver enzymes and if elevated this can be further evaluated with MRCP to exclude biliary ductal stone. 2.  Lobulated cystic area in the anterior right cardiophrenic fat, could represent pericardial cyst. 3.  Fibroid uterus.    XR Chest 1 View    Result Date: 8/11/2024  EXAM: XR CHEST 1 VIEW LOCATION: [facility] DATE: 8/11/2024 INDICATION: fever COMPARISON: None.     IMPRESSION: Negative chest.       Signed by: Carli Reynoso MD

## 2024-08-11 NOTE — CONSULTS
Consultation - Infectious Disease  [facility]  Jeannine Rasheed,  1960, MRN [id number]    Admitting Dx: JESS (acute kidney injury) (H24) [N17.9]  Febrile illness, acute [R50.9]  Pancytopenia (H) [D61.818]    PCP: Carol Chavez, [phone]       ASSESSMENT   63-year-old woman with a history of diabetes, hypothyroidism, hyperlipidemia who is admitted with acute onset of fevers and pancytopenia.  ID is consulted for fevers.    Anaplasmosis.  Patient has frequent exposures to the outdoors.  No recent tick bites that she is aware of.  Anaplasma PCR positive.  Negative for Babesia and Ehrlichia.  Pancytopenia.  Last CBC was 2023 which had mild anemia.  Presumably, pancytopenia is related to acute infection.  Will need to monitor for improvement on treatment.  Moderate neutropenia at 900 today.  Transaminitis.  Mild elevation.  Likely secondary to Anaplasma infection      Active Problems:    JESS (acute kidney injury) (H24)    Febrile illness, acute    Pancytopenia (H)       PLAN   -Resume doxycycline  -Continue gram-negative coverage with Zosyn while neutropenic  -Follow-up on blood culture  -Discontinue vancomycin  -Follow fever curve and CBC  -Coinfection with Lyme disease can occur.  Doxycycline will treat this.  Will order serologies, but for acute Lyme they may not be positive yet.  Duration of therapy to cover both Anaplasma and potential Lyme infection.    Thank you for this consult. Will follow.    Thien Dobbins MD  [facility]  Direct messaging: [employer] Paging  On-Call ID provider: [phone], option: [phone]      ===========================================      Chief Complaint   <principal problem not specified>       HPI     We have been requested by Noah Meade DO to evaluate Jeannine Rasheed for the above.    History obtained by patient    Jeannine Rasheed is a 63 year old woman with a history of diabetes, hyperlipidemia, hypothyroidism who  is admitted with fevers.  She was in her normal state of health until about 4 days ago when she developed fevers and chills and weakness.  She was monitoring her symptoms at home.  She came to urgent care yesterday morning and was tested for COVID, which was negative.  Her daughter, who is a nurse noted that her blood glucose was elevated yesterday, and sent her to the ER.  The patient was started on broad-spectrum antibiotics.  Due to recent travel up [place] she was given a dose of doxycycline.  This afternoon PCR for Anaplasma returned positive.    The patient lives at home with her  and dog in [place].  She is an active person, playing pickle ball and golfing in the league.  There are woods behind her house and she is regularly exposed to the outdoors.  She [place] land [place] of [place] and was there on Tuesday and Wednesday of last week where they stayed in campers.  Fever started Wednesday morning.    Denies rash or joint pain          Review of Systems   Ten systems reviewed and negative except for what is noted in the HPI       Medical History  Past Medical History:   Diagnosis Date    Diabetes (H) 1997    Thyroid disease     Surgical History  She  has a past surgical history that includes REVISE MEDIAN N/CARPAL TUNNEL SURG; CORRECT BUNION,DOUBLE OSTEOTOMY; Ankle Fracture Surgery (Right, 2005); colonoscopy (2013); and Soft tissue surgery (trigger finger and carpal tunnel).     Social History  Reviewed, and she  reports that she has never smoked. She has never used smokeless tobacco. She reports that she does not currently use alcohol. She reports that she does not use drugs.  Social History     Social History Narrative    Not on file     Family History  family history includes Cancer in her maternal grandmother and mother; Cerebrovascular Disease (age of onset: 69.00) in her mother; Coronary Artery Disease in her brother; Diabetes in her brother; Heart Disease in her brother, father, and  "paternal grandmother; Hyperlipidemia in her father and mother; Osteoporosis in her mother.  family history reviewed and is not pertinent to the presenting problem.            Allergies   No Known Allergies      Antibiotics   Zosyn 8/11-  Vancomycin 8/11-  Doxycycline 8/11    Previous:  None      Physical Exam     Temp:  [97.5  F (36.4  C)-101.4  F (38.6  C)] 100.4  F (38  C)  Pulse:  [] 101  Resp:  [20] 20  BP: (112-132)/(55-64) 131/64  SpO2:  [91 %-97 %] 93 %    /64 (BP Location: Left arm)   Pulse 101   Temp 100.4  F (38  C) (Oral)   Resp 20   Ht 1.549 m (5' 1\")   Wt 65.8 kg (145 lb)   SpO2 93%   BMI 27.40 kg/m      GENERAL:  well-developed, well-nourished, sitting in bed in no acute distress.   HENT:  Head is normocephalic, atraumatic. Oropharynx is moist without exudates or ulcers.  No thrush  EYES:  Eyes have anicteric sclerae without conjunctival injection or stigmata of endocarditis.   NECK:  Supple.  LUNGS:  Clear to auscultation.  CARDIOVASCULAR:  Regular rate and rhythm with no murmurs, gallops or rubs.  ABDOMEN:  Normal bowel sounds, soft, nontender. No appreciable hepatosplenomegaly  EXT: Extremities warm and without edema.  SKIN:  No acute rashes.  No stigmata of endocarditis.  NEUROLOGIC:  Grossly nonfocal.      Cultures   8/11 blood cultures x 2: No growth to date    No results found for the last 90 days.       Laboratory results     Recent Labs   Lab 08/11/24  0757 08/10/24  2306   WBC 1.1* 1.4*   HGB 9.8* 10.6*   PLT 23*  --        Recent Labs   Lab 08/11/24  0757 08/10/24  2306   * 136   CO2 18* 19*   BUN 22.8 25.9*   ALBUMIN 3.1* 3.8   ALKPHOS 37* 40   ALT 51* 55*   AST 87* 84*       Recent Labs   Lab 08/10/24  2306   CRPI 130.70*   SED 32*           Imaging   Radiology results reviewed    MR Abdomen MRCP w/o & w Contrast    Result Date: 8/11/2024  EXAM: MR ABDOMEN MRCP W/O and W CONTRAST LOCATION: [facility] DATE: 8/11/2024 INDICATION: Elevated " liver enzymes, abdominal pain and mild dilatation of the common bile duct with apparent distal CBD filling defect at today's earlier CT. COMPARISON: CT AP 8/11/2024 12:37 AM TECHNIQUE: Routine MR liver/pancreas protocol including axial and coronal MRCP sequences. 2D and 3D reconstruction performed by MR technologist including MIP reconstruction and slab cholangiograms. If performed with contrast, additional dynamic T1 post IV contrast images. CONTRAST: 7ml Gadavist FINDINGS: MRCP: Normal caliber intra and extrahepatic bile ducts with no stone, sludge, stricture or mass. Normal caliber pancreatic duct with classic anatomy. Gallbladder content with mild increased T1 signal (concentrated bile versus sludge) and minimal uniform mural edema but no discrete stones or significant inflammatory change. LIVER: Moderate diffuse hepatic steatosis. Liver is otherwise normal. PANCREAS: Normal. ADDITIONAL FINDINGS: No significant abnormality in the spleen, kidneys, and adrenal glands. No adenopathy. No ascites.     IMPRESSION: 1.  Normal caliber intra and extrahepatic bile ducts with no stone, sludge, stricture or mass. 2.  Gallbladder content with mild increased T1 signal (concentrated bile versus sludge) and minimal uniform mural edema but no discrete stones or significant inflammatory change. 3.  Moderate diffuse hepatic steatosis.    CT Abdomen Pelvis w Contrast    Result Date: 8/11/2024  EXAM: CT ABDOMEN PELVIS W CONTRAST LOCATION: [facility] DATE: 8/11/2024 INDICATION: Abdominal pain non localized, hx fever. COMPARISON: None available. TECHNIQUE: CT scan of the abdomen and pelvis was performed after the injection of Isovue 370 90 mL intravenously. Multiplanar reformats were obtained. Dose reduction techniques were used. FINDINGS: LOWER CHEST: Lobulated cystic area in the anterior right cardiophrenic fat, could represent pericardial cyst. ABDOMEN/PELVIS: HEPATOBILIARY: No suspicious focal hepatic  lesion. No radiodense gallstones. Nodular thickening of the gallbladder fundus, can be seen with gallbladder adenomyomatosis. Mild dilatation of the common bile duct with apparent small filling defect in the distal aspect of the common bile duct (series 3 image 86), could represent biliary ductal stone. PANCREAS: No main pancreatic ductal dilatation or definite solid pancreatic mass. SPLEEN: No splenomegaly. ADRENAL GLANDS: No adrenal nodules. KIDNEYS/BLADDER: No radiodense kidney/ureteral stones or hydronephrosis in either kidney. BOWEL: No abnormally dilated bowel loops. Moderate amount of stool in the colon. PERITONEUM: No evidence of free fluid in the abdomen and pelvis. No free peritoneal or portal venous gas. PELVIC ORGANS: Fibroid uterus. VASCULATURE: Moderate atherosclerotic vascular calcification of the abdominal aorta and iliac arteries. LYMPH NODES: No significant abdominopelvic lymphadenopathy. MUSCULOSKELETAL: No suspicious osseous lesion.     IMPRESSION: 1.  Mild dilatation of the common bile duct with possible small filling defect in the distal aspect of the common bile duct, recommend correlation with liver enzymes and if elevated this can be further evaluated with MRCP to exclude biliary ductal stone. 2.  Lobulated cystic area in the anterior right cardiophrenic fat, could represent pericardial cyst. 3.  Fibroid uterus.    XR Chest 1 View    Result Date: 8/11/2024  EXAM: XR CHEST 1 VIEW LOCATION: [facility] DATE: 8/11/2024 INDICATION: fever COMPARISON: None.     IMPRESSION: Negative chest.      Data reviewed today: I reviewed all medications, new labs and imaging results over the last 24 hours. I personally reviewed the chest x-ray image(s) showing no infiltrate .  The patient's care was discussed with the Bedside Nurse, Patient, and Patient's Family.

## 2024-08-11 NOTE — H&P
[facility]    History and Physical - Hospitalist Service       Date of Admission:  8/10/2024    Assessment & Plan    Jeannine Rasheed is a 63 year old female admitted on 8/10/2024. She presented with chills, rigors and intermittent high-grade fever for the past 4 days.  Past medical history significant for type 2 diabetes mellitus, hyperlipidemia, hypothyroidism.      Acute febrile illness  Pancytopenia (leukopenia, microcytic anemia, thrombocytopenia)  -Presented with chills, rigors and intermittent high-grade fever of 4 days duration  -Travel to [place] and was hiking recently  -Did get a dose of doxycycline in the ER to cover for possible tickborne infection  -Peripheral smear for parasitic infection is also requested  -Broad-spectrum antibiotics with Zosyn and vancomycin.  Tailor to specific pathogen as needed  -MRSA nasal swab  - Chest x-ray is unremarkable, urinalysis pending  -Hematology consult for further evaluation of pancytopenia  -Anemia workup sent  -Vitamin B12 is very low.  A dose of cyanocobalamin injection given    Dilated CBD  -CT abdomen reported with Mild dilatation of the common bile duct  -Liver enzymes are mildly elevated with normal bilirubin total and direct  -MRCP for further evaluation    Imaging finding  Lobulated cystic area in the anterior right cardiophrenic fat, could represent pericardial cyst.  Outpatient follow-up    Type 2 diabetes mellitus  -On Tresiba, Jardiance and Ozempic at home  -Continue PTA Tresiba 10 units at bedtime  -Medium intensity insulin sliding scale and mealtime carb coverage  -Accu-Cheks  -Hypoglycemia protocol    Hypothyroidism  -PTA levothyroxine        Diet: Combination Diet Regular Diet Adult; Moderate Consistent Carb (60 g CHO per Meal) Diet    DVT Prophylaxis: Pneumatic Compression Devices  Camargo Catheter: Not present  Lines: None     Cardiac Monitoring: None  Code Status: Full Code      Clinically Significant Risk Factors  "Present on Admission          # Hypocalcemia: Lowest Ca = 8.2 mg/dL in last 2 days, will monitor and replace as appropriate            # Anemia: based on hgb <11       # Overweight: Estimated body mass index is 27.4 kg/m  as calculated from the following:    Height as of this encounter: 1.549 m (5' 1\").    Weight as of this encounter: 65.8 kg (145 lb).                    Disposition Plan     Medically Ready for Discharge: Anticipated in 2-4 Days           Lottie Quezada MD  Hospitalist Service  [facility]  Securely message with [employer] (more info)  Text page via [facility] Paging/Directory     ______________________________________________________________________    Chief Complaint   Chills, rigors and fever of 4 days duration    History is obtained from the patient    History of Present Illness   Jeannine Rasheed is a 63 year old female who presented with the above complaint.Past medical history significant for type 2 diabetes mellitus, hyperlipidemia, hypothyroidism.  Patient was relatively healthy 4 days ago at which time she started to experience muscle pain, chills and rigors associated with high-grade intermittent fever.  She states that she traveled to [place] and was hiking recently.  She denies having abdominal pain, change in bladder or bowel habits.  She also denies having nausea vomiting.  She denies having chest pain or palpitation.  Labs done in the ER were significant for pancytopenia, elevated ESR,  CRP and procalcitonin.  Abdominal imaging was significant for mildly dilated CBD.  Patient will be admitted for further workup and treatment of pancytopenia and acute febrile illness.      Past Medical History    Past Medical History:   Diagnosis Date    Diabetes (H) 1997    Thyroid disease        Past Surgical History   Past Surgical History:   Procedure Laterality Date    ANKLE FRACTURE SURGERY Right 2005    COLONOSCOPY  2013     CORRECT BUNION,DOUBLE OSTEOTOMY      " Description: Hallux Valgus (Bunion) Correction;  Recorded: 10/29/2013;  Comments: age 13    HC REVISE MEDIAN N/CARPAL TUNNEL SURG      Description: Neuroplasty Decompression Median Nerve At Carpal Tunnel;  Recorded: 10/26/2010;    SOFT TISSUE SURGERY  trigger finger and carpal tunnel       Prior to Admission Medications   Prior to Admission Medications   Prescriptions Last Dose Informant Patient Reported? Taking?   ONETOUCH ULTRA test strip 8/10/2024 at as directed  No Yes   Sig: USE TO TEST BLOOD SUGAR THREE TIMES A DAY OR AS DIRECTED   Semaglutide, 1 MG/DOSE, (OZEMPIC) 4 MG/3ML pen 8/4/2024 at AM 1mg  Yes Yes   Sig: Inject 1 mg subcutaneously every 7 days   co-enzyme Q-10 100 MG CAPS capsule 8/10/2024 at AM  Yes Yes   Sig: Take 100 mg by mouth every morning   empagliflozin (JARDIANCE) 25 MG TABS tablet 8/10/2024 at AM  Yes Yes   Sig: Take 25 mg by mouth every morning   insulin degludec (TRESIBA) 100 UNIT/ML pen 8/10/2024 at 1900  Yes Yes   Sig: Inject 10 Units subcutaneously at bedtime   insulin pen needle (BD PEN NEEDLE GEORGIE 2ND GEN) 32G X 4 MM miscellaneous 8/10/2024 at 1900  No Yes   Sig: USE 1 PEN NEEDLE DAILY AS DIRECTED   levothyroxine (SYNTHROID/LEVOTHROID) 100 MCG tablet 8/10/2024 at AM  Yes Yes   Sig: Take 100 mcg by mouth every morning   meloxicam (MOBIC) 15 MG tablet 8/10/2024 at afternoon  Yes Yes   Sig: Take 1 tablet by mouth daily at 2 pm   metFORMIN (GLUCOPHAGE) 1000 MG tablet 8/10/2024 at DINNER  Yes Yes   Sig: Take 1,000 mg by mouth 2 times daily (with meals)   simvastatin (ZOCOR) 40 MG tablet 8/10/2024 at HS  Yes Yes   Sig: Take 40 mg by mouth at bedtime      Facility-Administered Medications: None           Physical Exam   Vital Signs: Temp: 100.4  F (38  C) Temp src: Oral BP: 125/58 Pulse: 103   Resp: 20 SpO2: 91 % O2 Device: None (Room air)    Weight: 145 lbs 0 oz    General Appearance: No distress noted  Respiratory: Good air entry bilaterally  Cardiovascular: S1 and S2 heard, no murmur or  gallop  GI: Soft abdomen, no tenderness, normoactive bowel sounds  Skin: Intact and warm      Medical Decision Making       75 MINUTES SPENT BY ME on the date of service doing chart review, history, exam, documentation & further activities per the note.      Data

## 2024-08-11 NOTE — PHARMACY-VANCOMYCIN DOSING SERVICE
"Pharmacy Vancomycin Note  Date of Service 2024  Patient's  1960   63 year old, female    Indication: Febrile Neutropenia  Day of Therapy: 1  Current vancomycin regimen:  1000 mg IV q24h  Current vancomycin monitoring method: AUC  Current vancomycin therapeutic monitoring goal: 400-600 mg*h/L    [employer] Prediction of Current Vancomycin Regimen  Loading dose: 1500 mg IV  Regimen: 1000 mg IV every 24 hours.  Start time: 16:03 on 2024  Exposure target: AUC24 (range)400-600 mg/L.hr   AUC24,ss: 386 mg/L.hr  Probability of AUC24 > 400: 46 %  Ctrough,ss: 10.9 mg/L  Probability of Ctrough,ss > 20: 8 %  Probability of nephrotoxicity (Lodise MARITZA ): 6 %      Current estimated CrCl = Estimated Creatinine Clearance: 50.6 mL/min (A) (based on SCr of 1 mg/dL (H)).    Creatinine for last 3 days  8/10/2024: 11:06 PM Creatinine 1.19 mg/dL  2024:  7:57 AM Creatinine 1.00 mg/dL    Recent Vancomycin Levels (past 3 days)  No results found for requested labs within last 3 days.    Vancomycin IV Administrations (past 72 hours)                     vancomycin (VANCOCIN) 1,500 mg in sodium chloride 0.9 % 250 mL intermittent infusion (mg) 1,500 mg New Bag 24 0403                    Nephrotoxins and other renal medications (From now, onward)      Start     Dose/Rate Route Frequency Ordered Stop    24 0600  vancomycin (VANCOCIN) 1,250 mg in sodium chloride 0.9 % 250 mL intermittent infusion         1,250 mg  over 90 Minutes Intravenous EVERY 24 HOURS 24 0837      24 0800  piperacillin-tazobactam (ZOSYN) 3.375 g vial to attach to  mL bag        Note to Pharmacy: For [facility], [facility] and [facility]: For Zosyn-naive patients, use the \"Zosyn initial dose + extended infusion\" order panel.    3.375 g  over 240 Minutes Intravenous EVERY 8 HOURS 24 0342                 Contrast Orders - past 72 hours (72h ago, onward)      Start     Dose/Rate Route Frequency Stop    24 0100  iopamidol " (ISOVUE-370) solution 90 mL         90 mL Intravenous ONCE 08/11/24 0032            Interpretation of current regimen: reflective of AUC less than 400    Has serum creatinine changed greater than 50% in last 72 hours: No, however improved since admission (Scr 1.19 --> 1 mg/dL)    Renal Function: improving     [employer] Prediction of Planned New Vancomycin Regimen  Loading dose: N/A  Regimen: 1250 mg IV every 24 hours.  Start time: 08:41 on 08/12/2024  Exposure target: AUC24 (range)400-600 mg/L.hr   AUC24,ss: 475 mg/L.hr  Probability of AUC24 > 400: 69 %  Ctrough,ss: 13.5 mg/L  Probability of Ctrough,ss > 20: 19 %  Probability of nephrotoxicity (Lodise MARITZA 2009): 9 %      Plan:  Will empirically increase vancomycin to 1250 mg IV every 24 hours and check a drug level with am labs to evaluate new regimen.  Vancomycin monitoring method: AUC  Vancomycin therapeutic monitoring goal: 400-600 mg*h/L  Pharmacy will check vancomycin levels as appropriate in 1-3 Days.  Serum creatinine levels will be ordered daily for the first week of therapy and at least twice weekly for subsequent weeks.    Treasure Rasheed RP

## 2024-08-11 NOTE — PHARMACY-VANCOMYCIN DOSING SERVICE
Pharmacy Vancomycin Initial Note  Date of Service 2024  Patient's  1960  63 year old, female    Indication: Sepsis    Current estimated CrCl = Estimated Creatinine Clearance: 42.6 mL/min (A) (based on SCr of 1.19 mg/dL (H)).    Creatinine for last 3 days  8/10/2024: 11:06 PM Creatinine 1.19 mg/dL    Recent Vancomycin Level(s) for last 3 days  No results found for requested labs within last 3 days.      Vancomycin IV Administrations (past 72 hours)        No vancomycin orders with administrations in past 72 hours.                    Nephrotoxins and other renal medications (From now, onward)      Start     Dose/Rate Route Frequency Ordered Stop    24 0200  vancomycin (VANCOCIN) 1,500 mg in sodium chloride 0.9 % 250 mL intermittent infusion         1,500 mg  over 90 Minutes Intravenous ONCE 24 0129      24 0130  piperacillin-tazobactam (ZOSYN) 3.375 g vial to attach to  mL bag         3.375 g  over 30 Minutes Intravenous ONCE 24 0123              Contrast Orders - past 72 hours (72h ago, onward)      Start     Dose/Rate Route Frequency Stop    24 0100  iopamidol (ISOVUE-370) solution 90 mL         90 mL Intravenous ONCE 24 0032              Plan:  Start vancomycin  1500 mg IV once for loading dose (22.8 mg/kg)   This is a one time consult. Please consult pharmacy again if vancomycin to be continued.     Ana Reno, PharmD

## 2024-08-12 ENCOUNTER — TELEPHONE (OUTPATIENT)
Dept: FAMILY MEDICINE | Facility: CLINIC | Age: 64
End: 2024-08-12

## 2024-08-12 LAB
ANION GAP SERPL CALCULATED.3IONS-SCNC: 11 MMOL/L (ref 7–15)
B BURGDOR IGG+IGM SER QL: 0.11
BASOPHILS # BLD AUTO: 0 10E3/UL (ref 0–0.2)
BASOPHILS NFR BLD AUTO: 0 %
BASOPHILS NFR BLD AUTO: 1 %
BASOPHILS NFR BLD AUTO: 1 %
BUN SERPL-MCNC: 17.9 MG/DL (ref 8–23)
CALCIUM SERPL-MCNC: 7.9 MG/DL (ref 8.8–10.4)
CHLORIDE SERPL-SCNC: 106 MMOL/L (ref 98–107)
CREAT SERPL-MCNC: 0.97 MG/DL (ref 0.51–0.95)
EGFRCR SERPLBLD CKD-EPI 2021: 66 ML/MIN/1.73M2
EOSINOPHIL # BLD AUTO: 0 10E3/UL (ref 0–0.7)
EOSINOPHIL NFR BLD AUTO: 0 %
ERYTHROCYTE [DISTWIDTH] IN BLOOD BY AUTOMATED COUNT: 16.8 % (ref 10–15)
ERYTHROCYTE [DISTWIDTH] IN BLOOD BY AUTOMATED COUNT: 17 % (ref 10–15)
ERYTHROCYTE [DISTWIDTH] IN BLOOD BY AUTOMATED COUNT: 17 % (ref 10–15)
GLUCOSE BLDC GLUCOMTR-MCNC: 114 MG/DL (ref 70–99)
GLUCOSE BLDC GLUCOMTR-MCNC: 161 MG/DL (ref 70–99)
GLUCOSE BLDC GLUCOMTR-MCNC: 71 MG/DL (ref 70–99)
GLUCOSE BLDC GLUCOMTR-MCNC: 93 MG/DL (ref 70–99)
GLUCOSE SERPL-MCNC: 74 MG/DL (ref 70–99)
HCO3 SERPL-SCNC: 22 MMOL/L (ref 22–29)
HCT VFR BLD AUTO: 29.6 % (ref 35–47)
HCT VFR BLD AUTO: 30.1 % (ref 35–47)
HCT VFR BLD AUTO: 34.2 % (ref 35–47)
HGB BLD-MCNC: 10.6 G/DL (ref 11.7–15.7)
HGB BLD-MCNC: 9.3 G/DL (ref 11.7–15.7)
HGB BLD-MCNC: 9.4 G/DL (ref 11.7–15.7)
IMM GRANULOCYTES # BLD: 0 10E3/UL
IMM GRANULOCYTES NFR BLD: 0 %
IMM GRANULOCYTES NFR BLD: 1 %
IMM GRANULOCYTES NFR BLD: 1 %
LYMPHOCYTES # BLD AUTO: 0.2 10E3/UL (ref 0.8–5.3)
LYMPHOCYTES # BLD AUTO: 0.6 10E3/UL (ref 0.8–5.3)
LYMPHOCYTES # BLD AUTO: 0.7 10E3/UL (ref 0.8–5.3)
LYMPHOCYTES NFR BLD AUTO: 10 %
LYMPHOCYTES NFR BLD AUTO: 41 %
LYMPHOCYTES NFR BLD AUTO: 51 %
MCH RBC QN AUTO: 23.8 PG (ref 26.5–33)
MCH RBC QN AUTO: 24 PG (ref 26.5–33)
MCH RBC QN AUTO: 24.2 PG (ref 26.5–33)
MCHC RBC AUTO-ENTMCNC: 31 G/DL (ref 31.5–36.5)
MCHC RBC AUTO-ENTMCNC: 31.2 G/DL (ref 31.5–36.5)
MCHC RBC AUTO-ENTMCNC: 31.4 G/DL (ref 31.5–36.5)
MCV RBC AUTO: 77 FL (ref 78–100)
MONOCYTES # BLD AUTO: 0.1 10E3/UL (ref 0–1.3)
MONOCYTES # BLD AUTO: 0.1 10E3/UL (ref 0–1.3)
MONOCYTES # BLD AUTO: 0.3 10E3/UL (ref 0–1.3)
MONOCYTES NFR BLD AUTO: 10 %
MONOCYTES NFR BLD AUTO: 19 %
MONOCYTES NFR BLD AUTO: 6 %
NEUTROPHILS # BLD AUTO: 0.5 10E3/UL (ref 1.6–8.3)
NEUTROPHILS # BLD AUTO: 0.6 10E3/UL (ref 1.6–8.3)
NEUTROPHILS # BLD AUTO: 1.2 10E3/UL (ref 1.6–8.3)
NEUTROPHILS NFR BLD AUTO: 37 %
NEUTROPHILS NFR BLD AUTO: 39 %
NEUTROPHILS NFR BLD AUTO: 84 %
NRBC # BLD AUTO: 0 10E3/UL
NRBC BLD AUTO-RTO: 0 /100
PATH REV: NORMAL
PATH REV: NORMAL
PLAT MORPH BLD: NORMAL
PLATELET # BLD AUTO: 25 10E3/UL (ref 150–450)
PLATELET # BLD AUTO: 26 10E3/UL (ref 150–450)
PLATELET # BLD AUTO: 33 10E3/UL (ref 150–450)
POTASSIUM SERPL-SCNC: 3.6 MMOL/L (ref 3.4–5.3)
RBC # BLD AUTO: 3.84 10E6/UL (ref 3.8–5.2)
RBC # BLD AUTO: 3.91 10E6/UL (ref 3.8–5.2)
RBC # BLD AUTO: 4.46 10E6/UL (ref 3.8–5.2)
RBC MORPH BLD: NORMAL
RETICS # AUTO: 0.01 10E6/UL (ref 0.03–0.1)
RETICS/RBC NFR AUTO: 0.3 % (ref 0.5–2)
SODIUM SERPL-SCNC: 139 MMOL/L (ref 135–145)
WBC # BLD AUTO: 1.3 10E3/UL (ref 4–11)
WBC # BLD AUTO: 1.4 10E3/UL (ref 4–11)
WBC # BLD AUTO: 1.4 10E3/UL (ref 4–11)

## 2024-08-12 PROCEDURE — 120N000001 HC R&B MED SURG/OB

## 2024-08-12 PROCEDURE — 99232 SBSQ HOSP IP/OBS MODERATE 35: CPT | Performed by: INTERNAL MEDICINE

## 2024-08-12 PROCEDURE — 85045 AUTOMATED RETICULOCYTE COUNT: CPT | Performed by: INTERNAL MEDICINE

## 2024-08-12 PROCEDURE — 85060 BLOOD SMEAR INTERPRETATION: CPT | Performed by: PATHOLOGY

## 2024-08-12 PROCEDURE — 85025 COMPLETE CBC W/AUTO DIFF WBC: CPT | Performed by: INTERNAL MEDICINE

## 2024-08-12 PROCEDURE — 250N000013 HC RX MED GY IP 250 OP 250 PS 637: Performed by: INTERNAL MEDICINE

## 2024-08-12 PROCEDURE — 250N000011 HC RX IP 250 OP 636: Performed by: STUDENT IN AN ORGANIZED HEALTH CARE EDUCATION/TRAINING PROGRAM

## 2024-08-12 PROCEDURE — 80048 BASIC METABOLIC PNL TOTAL CA: CPT | Performed by: INTERNAL MEDICINE

## 2024-08-12 PROCEDURE — 250N000013 HC RX MED GY IP 250 OP 250 PS 637: Performed by: FAMILY MEDICINE

## 2024-08-12 PROCEDURE — 36415 COLL VENOUS BLD VENIPUNCTURE: CPT | Performed by: INTERNAL MEDICINE

## 2024-08-12 PROCEDURE — 250N000013 HC RX MED GY IP 250 OP 250 PS 637: Performed by: STUDENT IN AN ORGANIZED HEALTH CARE EDUCATION/TRAINING PROGRAM

## 2024-08-12 PROCEDURE — 99232 SBSQ HOSP IP/OBS MODERATE 35: CPT | Performed by: FAMILY MEDICINE

## 2024-08-12 RX ADMIN — PIPERACILLIN AND TAZOBACTAM 3.38 G: 3; .375 INJECTION, POWDER, FOR SOLUTION INTRAVENOUS at 08:09

## 2024-08-12 RX ADMIN — Medication 1000 MCG: at 16:43

## 2024-08-12 RX ADMIN — POLYETHYLENE GLYCOL 3350 17 G: 17 POWDER, FOR SOLUTION ORAL at 08:10

## 2024-08-12 RX ADMIN — LEVOTHYROXINE SODIUM 100 MCG: 100 TABLET ORAL at 08:09

## 2024-08-12 RX ADMIN — INSULIN DEGLUDEC 10 UNITS: 100 INJECTION, SOLUTION SUBCUTANEOUS at 21:23

## 2024-08-12 RX ADMIN — PIPERACILLIN AND TAZOBACTAM 3.38 G: 3; .375 INJECTION, POWDER, FOR SOLUTION INTRAVENOUS at 02:28

## 2024-08-12 RX ADMIN — DOXYCYCLINE 100 MG: 100 CAPSULE ORAL at 21:23

## 2024-08-12 RX ADMIN — DOXYCYCLINE 100 MG: 100 CAPSULE ORAL at 10:05

## 2024-08-12 RX ADMIN — ACETAMINOPHEN 650 MG: 325 TABLET ORAL at 13:32

## 2024-08-12 RX ADMIN — PIPERACILLIN AND TAZOBACTAM 3.38 G: 3; .375 INJECTION, POWDER, FOR SOLUTION INTRAVENOUS at 16:43

## 2024-08-12 ASSESSMENT — ACTIVITIES OF DAILY LIVING (ADL)
ADLS_ACUITY_SCORE: 23
ADLS_ACUITY_SCORE: 34
ADLS_ACUITY_SCORE: 34
ADLS_ACUITY_SCORE: 33
ADLS_ACUITY_SCORE: 34
DOING_ERRANDS_INDEPENDENTLY_DIFFICULTY: NO
ADLS_ACUITY_SCORE: 23
DRESSING/BATHING_DIFFICULTY: NO
ADLS_ACUITY_SCORE: 34
ADLS_ACUITY_SCORE: 23
TOILETING_ISSUES: NO
ADLS_ACUITY_SCORE: 34
ADLS_ACUITY_SCORE: 34
ADLS_ACUITY_SCORE: 23
DIFFICULTY_EATING/SWALLOWING: NO
ADLS_ACUITY_SCORE: 23
ADLS_ACUITY_SCORE: 34
ADLS_ACUITY_SCORE: 23
ADLS_ACUITY_SCORE: 23
ADLS_ACUITY_SCORE: 34
WALKING_OR_CLIMBING_STAIRS_DIFFICULTY: NO
ADLS_ACUITY_SCORE: 23

## 2024-08-12 NOTE — TELEPHONE ENCOUNTER
FYI - Status Update    Who is Calling: patient    Update: Pt wanted to let PCP know that she is in the [facility] for tick disease    Does caller want a call/response back: Yes     Could we send this information to you in [employer] or would you prefer to receive a phone call?:   Patient would prefer a phone call   Okay to leave a detailed message?: Yes at Cell number on file:    Telephone Information:   Mobile [phone]

## 2024-08-12 NOTE — PLAN OF CARE
Problem: Adult Inpatient Plan of Care  Goal: Plan of Care Review  Description: The Plan of Care Review/Shift note should be completed every shift.  The Outcome Evaluation is a brief statement about your assessment that the patient is improving, declining, or no change.  This information will be displayed automatically on your shift  note.  8/12/2024 0636 by Jennifer Ponce RN  Outcome: Progressing  8/12/2024 0636 by Jennifer Ponce RN  Outcome: Progressing   Goal Outcome Evaluation:    Pt alert and oriented, denies pain, chills, SOB, call light within reach, makes needs known, IV ABXgiven, generalized weakness, reports feeling better, slept between cares        Critical lab:  Platelets 25, provider notify, no change

## 2024-08-12 NOTE — PROGRESS NOTES
[facility]    Medicine Progress Note - Hospitalist Service    Date of Admission:  8/10/2024    Assessment & Plan    Jeannine Rasheed is a 63 year old female who presented with chills, rigors and intermittent high-grade fever for the past 4 days.  Found to have pancytopenia and anaplasmosis.      Asnaplasmosis  Pancytopenia (leukopenia, microcytic anemia, thrombocytopenia)  -Presented with chills, rigors and intermittent high-grade fever of 4 days duration  -[place] [facility] [place]  -on doxycycline (I did unhold)  -Peripheral smear for parasitic infection pneding  -initially on Broad-spectrum antibiotics with Zosyn and vancomycin.    -MRSA nasal swab negative  - Chest x-ray is unremarkable, urinalysis pending  -Hematology consulted - reviewed  -Anemia workup sent  -await ANC improvement for discharge    Vitamin B12 deficiency  --found during weork up  --give shot  ---start oral replacement    Dilated CBD  -CT abdomen reported with Mild dilatation of the common bile duct  -Liver enzymes are mildly elevated with normal bilirubin total and direct  -MRCP negative  -trend lft    Imaging finding  Lobulated cystic area in the anterior right cardiophrenic fat, could represent pericardial cyst.  Outpatient follow-up    Type 2 diabetes mellitus  -accuchecks low  On Tresiba, Jardiance and Ozempic at home  -Continue PTA Tresiba 10 units at bedtime  -Medium intensity insulin sliding scale and mealtime carb coverage    Hypothyroidism  -PTA levothyroxine        Diet: Combination Diet Regular Diet Adult; Moderate Consistent Carb (60 g CHO per Meal) Diet    DVT Prophylaxis: Pneumatic Compression Devices  Camargo Catheter: Not present  Lines: None     Cardiac Monitoring: None  Code Status: Full Code            Diet: Combination Diet Regular Diet Adult; Moderate Consistent Carb (60 g CHO per Meal) Diet    DVT Prophylaxis: Ambulate every shift  Camargo Catheter: Not present  Lines: None     Cardiac  "Monitoring: None  Code Status: Full Code      Clinically Significant Risk Factors              # Hypoalbuminemia: Lowest albumin = 3.1 g/dL at 8/11/2024  7:57 AM, will monitor as appropriate   # Thrombocytopenia: Lowest platelets = 23 in last 2 days, will monitor for bleeding             # Overweight: Estimated body mass index is 27.4 kg/m  as calculated from the following:    Height as of this encounter: 1.549 m (5' 1\").    Weight as of this encounter: 65.8 kg (145 lb)., PRESENT ON ADMISSION                  Disposition Plan     Medically Ready for Discharge: Anticipated Tomorrow             Lucila Goodman MD  Hospitalist Service  [facility]  Securely message with [email or web address] (more info)  Text page via [email or web address] Paging/Directory   ______________________________________________________________________    Interval History   --- Patient seen with she and her  in the room.  Answered several questions regarding tickborne illnesses, Babesia and Lyme.  Discussed the PCR testing.  --- Daughter concerned about.  Cardial fat pad seen on CT  ---  inquiring about parameters for discharge  --- She is starting to feel better in regards to weakness and myalgia.  --- They spend weeks up in [facility] where they have several acres.  She has been up there several times during the summer.  --- No tick bites that she knows of    Physical Exam   Vital Signs: Temp: 97.9  F (36.6  C) Temp src: Oral BP: 100/58 Pulse: 68   Resp: 20 SpO2: 96 % O2 Device: None (Room air)    Weight: 145 lbs 0 oz    General Appearance: Female no apparent distress  Respiratory: Clear to auscultation bilaterally  Cardiovascular: Regular rate and rhythm without murmurs rubs or gallops  GI: Soft and nontender  Skin: No rashes.  No significant edema  Other: Neurologically gross intact without focal deficits appreciated    Medical Decision Making             Data     I have personally reviewed the following data over the past 24 " hrs:    1.3 (L)  \   9.3 (L)   / 26 (LL)     139 106 17.9 /  93   3.6 22 0.97 (H) \     Procal: N/A CRP: N/A Lactic Acid: 0.8       Ferritin:  N/A % Retic:  0.3 (L) LDH:  N/A       Imaging results reviewed over the past 24 hrs:   No results found for this or any previous visit (from the past 24 hour(s)).

## 2024-08-12 NOTE — PROGRESS NOTES
"Infectious Diseases Progress Note  [facility]    Date of visit: 08/12/2024     ASSESSMENT   63-year-old woman with a history of diabetes, hypothyroidism, hyperlipidemia who is admitted with acute onset of fevers and pancytopenia.  ID is consulted for fevers.    Anaplasmosis.  Patient has frequent exposures to the outdoors.  No recent tick bites that she is aware of.  Anaplasma PCR positive.  Negative for Babesia and Ehrlichia.  Pancytopenia.  Last CBC was November 2023 which had mild anemia.  Presumably, pancytopenia is related to acute infection.  Will need to monitor for improvement on treatment.  Moderate neutropenia   Transaminitis.  Mild elevation.  Likely secondary to Anaplasma infection      Principal Problem:    Positive Anaplasma serology  Active Problems:    JESS (acute kidney injury) (H24)    Febrile illness, acute    Pancytopenia (H)       PLAN   -continue doxycycline  -Continue gram-negative coverage with Zosyn while neutropenic  -Coinfection with Lyme disease can occur.  Doxycycline will treat this.  Serologies neg, but for acute Lyme they may not be positive yet.  Duration of therapy to cover both Anaplasma and potential Lyme infection (total 10 days)  -discharge on doxycycline once ANC showing signs of recovery      Thien Dobbins MD  [facility]  Direct messaging: [employer] Paging  On-Call ID provider: [phone], option: 9      ===========================================      SUBJECTIVE / INTERVAL HISTORY:     Feeling better. No fevers. Strength improving      Antibiotics   Zosyn 8/11-    Doxycycline 8/11    Previous:  Vancomycin 8/11      Physical Exam     Temp:  [97.9  F (36.6  C)-98.9  F (37.2  C)] 97.9  F (36.6  C)  Pulse:  [68-86] 68  Resp:  [20] 20  BP: ()/(51-58) 100/58  SpO2:  [92 %-97 %] 96 %    /58 (BP Location: Left arm)   Pulse 68   Temp 97.9  F (36.6  C) (Oral)   Resp 20   Ht 1.549 m (5' 1\")   Wt 65.8 kg (145 lb)   SpO2 96%   BMI " 27.40 kg/m      GENERAL:  well-developed, well-nourished, sitting in bed in no acute distress.   HENT:  Head is normocephalic, atraumatic.   EYES:  Eyes have anicteric sclerae without conjunctival injection   LUNGS:  Clear to auscultation.  CARDIOVASCULAR:  Regular rate and rhythm with no murmurs, gallops or rubs.  ABDOMEN:  Normal bowel sounds, soft, nontender.   EXT: Extremities warm and without edema.  SKIN:  No acute rashes.    NEUROLOGIC:  Grossly nonfocal.      Cultures   8/11 blood cultures x 2: No growth to date    No results found for the last 90 days.         Pertinent Labs:     Recent Labs   Lab 08/12/24  0652 08/12/24  0018 08/11/24  0757   WBC 1.3* 1.4* 1.1*   HGB 9.3* 9.4* 9.8*   PLT 26* 25* 23*       Recent Labs   Lab 08/12/24  0652 08/11/24  1506 08/11/24  0757 08/10/24  2306     --  133* 136   CO2 22  --  18* 19*   BUN 17.9  --  22.8 25.9*   ALBUMIN  --   --  3.1* 3.8   ALKPHOS  --   --  37* 40   ALT  --  46 51* 55*   AST  --   --  87* 84*       Recent Labs   Lab 08/10/24  2306   CRPI 130.70*   SED 32*           Imaging:     MR Abdomen MRCP w/o & w Contrast    Result Date: 8/11/2024  EXAM: MR ABDOMEN MRCP W/O and W CONTRAST LOCATION: [facility] DATE: 8/11/2024 INDICATION: Elevated liver enzymes, abdominal pain and mild dilatation of the common bile duct with apparent distal CBD filling defect at today's earlier CT. COMPARISON: CT AP 8/11/2024 12:37 AM TECHNIQUE: Routine MR liver/pancreas protocol including axial and coronal MRCP sequences. 2D and 3D reconstruction performed by MR technologist including MIP reconstruction and slab cholangiograms. If performed with contrast, additional dynamic T1 post IV contrast images. CONTRAST: 7ml Gadavist FINDINGS: MRCP: Normal caliber intra and extrahepatic bile ducts with no stone, sludge, stricture or mass. Normal caliber pancreatic duct with classic anatomy. Gallbladder content with mild increased T1 signal (concentrated bile  versus sludge) and minimal uniform mural edema but no discrete stones or significant inflammatory change. LIVER: Moderate diffuse hepatic steatosis. Liver is otherwise normal. PANCREAS: Normal. ADDITIONAL FINDINGS: No significant abnormality in the spleen, kidneys, and adrenal glands. No adenopathy. No ascites.     IMPRESSION: 1.  Normal caliber intra and extrahepatic bile ducts with no stone, sludge, stricture or mass. 2.  Gallbladder content with mild increased T1 signal (concentrated bile versus sludge) and minimal uniform mural edema but no discrete stones or significant inflammatory change. 3.  Moderate diffuse hepatic steatosis.    CT Abdomen Pelvis w Contrast    Result Date: 8/11/2024  EXAM: CT ABDOMEN PELVIS W CONTRAST LOCATION: [facility] DATE: 8/11/2024 INDICATION: Abdominal pain non localized, hx fever. COMPARISON: None available. TECHNIQUE: CT scan of the abdomen and pelvis was performed after the injection of Isovue 370 90 mL intravenously. Multiplanar reformats were obtained. Dose reduction techniques were used. FINDINGS: LOWER CHEST: Lobulated cystic area in the anterior right cardiophrenic fat, could represent pericardial cyst. ABDOMEN/PELVIS: HEPATOBILIARY: No suspicious focal hepatic lesion. No radiodense gallstones. Nodular thickening of the gallbladder fundus, can be seen with gallbladder adenomyomatosis. Mild dilatation of the common bile duct with apparent small filling defect in the distal aspect of the common bile duct (series 3 image 86), could represent biliary ductal stone. PANCREAS: No main pancreatic ductal dilatation or definite solid pancreatic mass. SPLEEN: No splenomegaly. ADRENAL GLANDS: No adrenal nodules. KIDNEYS/BLADDER: No radiodense kidney/ureteral stones or hydronephrosis in either kidney. BOWEL: No abnormally dilated bowel loops. Moderate amount of stool in the colon. PERITONEUM: No evidence of free fluid in the abdomen and pelvis. No free peritoneal  or portal venous gas. PELVIC ORGANS: Fibroid uterus. VASCULATURE: Moderate atherosclerotic vascular calcification of the abdominal aorta and iliac arteries. LYMPH NODES: No significant abdominopelvic lymphadenopathy. MUSCULOSKELETAL: No suspicious osseous lesion.     IMPRESSION: 1.  Mild dilatation of the common bile duct with possible small filling defect in the distal aspect of the common bile duct, recommend correlation with liver enzymes and if elevated this can be further evaluated with MRCP to exclude biliary ductal stone. 2.  Lobulated cystic area in the anterior right cardiophrenic fat, could represent pericardial cyst. 3.  Fibroid uterus.    XR Chest 1 View    Result Date: 8/11/2024  EXAM: XR CHEST 1 VIEW LOCATION: [facility] DATE: 8/11/2024 INDICATION: fever COMPARISON: None.     IMPRESSION: Negative chest.         Data reviewed today: I reviewed all medications, new labs and imaging results over the last 24 hours. I personally reviewed no images or EKG's today.  The patient's care was discussed with the Bedside Nurse, Patient, Patient's Family, and Primary team.

## 2024-08-12 NOTE — PLAN OF CARE
Goal Outcome Evaluation:      Plan of Care Reviewed With: patient, spouse, child    Overall Patient Progress: improvingOverall Patient Progress: improving    Outcome Evaluation: Independent with self cares, denies pain, patient states she feels much improved and clearer mentally.

## 2024-08-13 ENCOUNTER — MYC MEDICAL ADVICE (OUTPATIENT)
Dept: FAMILY MEDICINE | Facility: CLINIC | Age: 64
End: 2024-08-13
Payer: COMMERCIAL

## 2024-08-13 LAB
BASOPHILS # BLD AUTO: 0 10E3/UL (ref 0–0.2)
BASOPHILS NFR BLD AUTO: 1 %
BURR CELLS BLD QL SMEAR: SLIGHT
CREAT SERPL-MCNC: 0.89 MG/DL (ref 0.51–0.95)
EGFRCR SERPLBLD CKD-EPI 2021: 73 ML/MIN/1.73M2
EOSINOPHIL # BLD AUTO: 0 10E3/UL (ref 0–0.7)
EOSINOPHIL NFR BLD AUTO: 1 %
ERYTHROCYTE [DISTWIDTH] IN BLOOD BY AUTOMATED COUNT: 16.8 % (ref 10–15)
GLUCOSE BLDC GLUCOMTR-MCNC: 123 MG/DL (ref 70–99)
GLUCOSE BLDC GLUCOMTR-MCNC: 130 MG/DL (ref 70–99)
GLUCOSE BLDC GLUCOMTR-MCNC: 156 MG/DL (ref 70–99)
GLUCOSE BLDC GLUCOMTR-MCNC: 79 MG/DL (ref 70–99)
GLUCOSE BLDC GLUCOMTR-MCNC: 86 MG/DL (ref 70–99)
HCT VFR BLD AUTO: 29.3 % (ref 35–47)
HGB BLD-MCNC: 9.2 G/DL (ref 11.7–15.7)
IMM GRANULOCYTES # BLD: 0 10E3/UL
IMM GRANULOCYTES NFR BLD: 1 %
LYMPHOCYTES # BLD AUTO: 0.9 10E3/UL (ref 0.8–5.3)
LYMPHOCYTES NFR BLD AUTO: 53 %
MCH RBC QN AUTO: 24.1 PG (ref 26.5–33)
MCHC RBC AUTO-ENTMCNC: 31.4 G/DL (ref 31.5–36.5)
MCV RBC AUTO: 77 FL (ref 78–100)
MONOCYTES # BLD AUTO: 0.2 10E3/UL (ref 0–1.3)
MONOCYTES NFR BLD AUTO: 10 %
NEUTROPHILS # BLD AUTO: 0.6 10E3/UL (ref 1.6–8.3)
NEUTROPHILS NFR BLD AUTO: 35 %
NRBC # BLD AUTO: 0 10E3/UL
NRBC BLD AUTO-RTO: 1 /100
PATH REPORT.COMMENTS IMP SPEC: NORMAL
PATH REPORT.FINAL DX SPEC: NORMAL
PATH REPORT.MICROSCOPIC SPEC OTHER STN: NORMAL
PLAT MORPH BLD: ABNORMAL
PLATELET # BLD AUTO: 38 10E3/UL (ref 150–450)
RBC # BLD AUTO: 3.82 10E6/UL (ref 3.8–5.2)
RBC MORPH BLD: ABNORMAL
WBC # BLD AUTO: 1.8 10E3/UL (ref 4–11)

## 2024-08-13 PROCEDURE — 250N000013 HC RX MED GY IP 250 OP 250 PS 637: Performed by: STUDENT IN AN ORGANIZED HEALTH CARE EDUCATION/TRAINING PROGRAM

## 2024-08-13 PROCEDURE — 99232 SBSQ HOSP IP/OBS MODERATE 35: CPT | Performed by: INTERNAL MEDICINE

## 2024-08-13 PROCEDURE — 82565 ASSAY OF CREATININE: CPT | Performed by: STUDENT IN AN ORGANIZED HEALTH CARE EDUCATION/TRAINING PROGRAM

## 2024-08-13 PROCEDURE — 120N000001 HC R&B MED SURG/OB

## 2024-08-13 PROCEDURE — 85025 COMPLETE CBC W/AUTO DIFF WBC: CPT | Performed by: INTERNAL MEDICINE

## 2024-08-13 PROCEDURE — 36415 COLL VENOUS BLD VENIPUNCTURE: CPT | Performed by: STUDENT IN AN ORGANIZED HEALTH CARE EDUCATION/TRAINING PROGRAM

## 2024-08-13 PROCEDURE — 250N000013 HC RX MED GY IP 250 OP 250 PS 637: Performed by: FAMILY MEDICINE

## 2024-08-13 PROCEDURE — 250N000013 HC RX MED GY IP 250 OP 250 PS 637: Performed by: INTERNAL MEDICINE

## 2024-08-13 PROCEDURE — 250N000011 HC RX IP 250 OP 636: Performed by: STUDENT IN AN ORGANIZED HEALTH CARE EDUCATION/TRAINING PROGRAM

## 2024-08-13 RX ORDER — LEVOFLOXACIN 750 MG/1
750 TABLET, FILM COATED ORAL DAILY
Status: DISCONTINUED | OUTPATIENT
Start: 2024-08-13 | End: 2024-08-14 | Stop reason: HOSPADM

## 2024-08-13 RX ADMIN — DOXYCYCLINE 100 MG: 100 CAPSULE ORAL at 08:36

## 2024-08-13 RX ADMIN — LEVOTHYROXINE SODIUM 100 MCG: 100 TABLET ORAL at 08:36

## 2024-08-13 RX ADMIN — DOXYCYCLINE 100 MG: 100 CAPSULE ORAL at 20:55

## 2024-08-13 RX ADMIN — LEVOFLOXACIN 750 MG: 750 TABLET, FILM COATED ORAL at 14:45

## 2024-08-13 RX ADMIN — Medication 1000 MCG: at 08:36

## 2024-08-13 RX ADMIN — PIPERACILLIN AND TAZOBACTAM 3.38 G: 3; .375 INJECTION, POWDER, FOR SOLUTION INTRAVENOUS at 08:42

## 2024-08-13 RX ADMIN — PIPERACILLIN AND TAZOBACTAM 3.38 G: 3; .375 INJECTION, POWDER, FOR SOLUTION INTRAVENOUS at 00:32

## 2024-08-13 RX ADMIN — INSULIN DEGLUDEC 10 UNITS: 100 INJECTION, SOLUTION SUBCUTANEOUS at 20:55

## 2024-08-13 ASSESSMENT — ACTIVITIES OF DAILY LIVING (ADL)
ADLS_ACUITY_SCORE: 23

## 2024-08-13 NOTE — PLAN OF CARE
Problem: Infection  Goal: Absence of Infection Signs and Symptoms  Outcome: Progressing   Goal Outcome Evaluation:    Patient is A/O x4. VSS on RA.  Denies pain but endorses generalized weakness and fatigue; LS clear; CC diet, slept well tonight; hopes to be discharged home today

## 2024-08-13 NOTE — PROGRESS NOTES
"Infectious Diseases Progress Note  [facility]    Date of visit: 08/13/2024     ASSESSMENT   63-year-old woman with a history of diabetes, hypothyroidism, hyperlipidemia who is admitted with acute onset of fevers and pancytopenia.  ID is consulted for fevers.    Anaplasmosis.  Patient has frequent exposures to the outdoors.  No recent tick bites that she is aware of.  Anaplasma PCR positive.  Negative for Babesia and Ehrlichia.  Pancytopenia.  Last CBC was November 2023 which had mild anemia.  Presumably, pancytopenia is related to acute infection.  Will need to monitor for improvement on treatment.  Moderate neutropenia   Transaminitis.  Mild elevation.  Likely secondary to Anaplasma infection      Principal Problem:    Positive Anaplasma serology  Active Problems:    JESS (acute kidney injury) (H24)    Febrile illness, acute    Pancytopenia (H)       PLAN   -continue doxycycline, plan 10-day course  -discontinue pipercillin/tazobactam  -levofloxacin 750mg daily for neutropenia prophylaxis  -repeat cbc with diff in am, will repeat lft as well      Thien Dobbins MD  [facility]  Direct messaging: [employer] Paging  On-Call ID provider: [phone], option: 9      ===========================================      SUBJECTIVE / INTERVAL HISTORY:     Having night sweats, otherwise no fever. No new symptoms      Antibiotics   Zosyn 8/11-  Doxycycline 8/11    Previous:  Vancomycin 8/11      Physical Exam     Temp:  [97.3  F (36.3  C)-98.2  F (36.8  C)] 97.3  F (36.3  C)  Pulse:  [63-69] 63  Resp:  [16-20] 18  BP: (101-123)/(60-62) 116/61  SpO2:  [99 %-100 %] 99 %    /61 (BP Location: Left arm)   Pulse 63   Temp 97.3  F (36.3  C) (Oral)   Resp 18   Ht 1.549 m (5' 1\")   Wt 65.8 kg (145 lb)   SpO2 99%   BMI 27.40 kg/m      GENERAL:  well-developed, well-nourished, sitting in bed in no acute distress.   HENT:  Head is normocephalic, atraumatic.   EYES:  Eyes have anicteric sclerae " without conjunctival injection   LUNGS:  normal resp pattern  CARDIOVASCULAR:  Regular rate and rhythm with no murmurs, gallops or rubs.  ABDOMEN:  Normal bowel sounds, soft, nontender.   EXT: Extremities warm and without edema.  SKIN:  No acute rashes.    NEUROLOGIC:  Grossly nonfocal.      Cultures   8/11 blood cultures x 2: No growth to date    No results found for the last 90 days.         Pertinent Labs:     Recent Labs   Lab 08/13/24  0552 08/12/24  0652 08/12/24  0018   WBC 1.8* 1.3* 1.4*   HGB 9.2* 9.3* 9.4*   PLT 38* 26* 25*       Recent Labs   Lab 08/12/24  0652 08/11/24  1506 08/11/24  0757 08/10/24  2306     --  133* 136   CO2 22  --  18* 19*   BUN 17.9  --  22.8 25.9*   ALBUMIN  --   --  3.1* 3.8   ALKPHOS  --   --  37* 40   ALT  --  46 51* 55*   AST  --   --  87* 84*       Recent Labs   Lab 08/10/24  2306   CRPI 130.70*   SED 32*           Imaging:     MR Abdomen MRCP w/o & w Contrast    Result Date: 8/11/2024  EXAM: MR ABDOMEN MRCP W/O and W CONTRAST LOCATION: [facility] DATE: 8/11/2024 INDICATION: Elevated liver enzymes, abdominal pain and mild dilatation of the common bile duct with apparent distal CBD filling defect at today's earlier CT. COMPARISON: CT AP 8/11/2024 12:37 AM TECHNIQUE: Routine MR liver/pancreas protocol including axial and coronal MRCP sequences. 2D and 3D reconstruction performed by MR technologist including MIP reconstruction and slab cholangiograms. If performed with contrast, additional dynamic T1 post IV contrast images. CONTRAST: 7ml Gadavist FINDINGS: MRCP: Normal caliber intra and extrahepatic bile ducts with no stone, sludge, stricture or mass. Normal caliber pancreatic duct with classic anatomy. Gallbladder content with mild increased T1 signal (concentrated bile versus sludge) and minimal uniform mural edema but no discrete stones or significant inflammatory change. LIVER: Moderate diffuse hepatic steatosis. Liver is otherwise normal.  PANCREAS: Normal. ADDITIONAL FINDINGS: No significant abnormality in the spleen, kidneys, and adrenal glands. No adenopathy. No ascites.     IMPRESSION: 1.  Normal caliber intra and extrahepatic bile ducts with no stone, sludge, stricture or mass. 2.  Gallbladder content with mild increased T1 signal (concentrated bile versus sludge) and minimal uniform mural edema but no discrete stones or significant inflammatory change. 3.  Moderate diffuse hepatic steatosis.    CT Abdomen Pelvis w Contrast    Result Date: 8/11/2024  EXAM: CT ABDOMEN PELVIS W CONTRAST LOCATION: [facility] DATE: 8/11/2024 INDICATION: Abdominal pain non localized, hx fever. COMPARISON: None available. TECHNIQUE: CT scan of the abdomen and pelvis was performed after the injection of Isovue 370 90 mL intravenously. Multiplanar reformats were obtained. Dose reduction techniques were used. FINDINGS: LOWER CHEST: Lobulated cystic area in the anterior right cardiophrenic fat, could represent pericardial cyst. ABDOMEN/PELVIS: HEPATOBILIARY: No suspicious focal hepatic lesion. No radiodense gallstones. Nodular thickening of the gallbladder fundus, can be seen with gallbladder adenomyomatosis. Mild dilatation of the common bile duct with apparent small filling defect in the distal aspect of the common bile duct (series 3 image 86), could represent biliary ductal stone. PANCREAS: No main pancreatic ductal dilatation or definite solid pancreatic mass. SPLEEN: No splenomegaly. ADRENAL GLANDS: No adrenal nodules. KIDNEYS/BLADDER: No radiodense kidney/ureteral stones or hydronephrosis in either kidney. BOWEL: No abnormally dilated bowel loops. Moderate amount of stool in the colon. PERITONEUM: No evidence of free fluid in the abdomen and pelvis. No free peritoneal or portal venous gas. PELVIC ORGANS: Fibroid uterus. VASCULATURE: Moderate atherosclerotic vascular calcification of the abdominal aorta and iliac arteries. LYMPH NODES: No  significant abdominopelvic lymphadenopathy. MUSCULOSKELETAL: No suspicious osseous lesion.     IMPRESSION: 1.  Mild dilatation of the common bile duct with possible small filling defect in the distal aspect of the common bile duct, recommend correlation with liver enzymes and if elevated this can be further evaluated with MRCP to exclude biliary ductal stone. 2.  Lobulated cystic area in the anterior right cardiophrenic fat, could represent pericardial cyst. 3.  Fibroid uterus.    XR Chest 1 View    Result Date: 8/11/2024  EXAM: XR CHEST 1 VIEW LOCATION: [facility] DATE: 8/11/2024 INDICATION: fever COMPARISON: None.     IMPRESSION: Negative chest.         Data reviewed today: I reviewed all medications, new labs and imaging results over the last 24 hours. I personally reviewed no images or EKG's today.  The patient's care was discussed with the Patient and Patient's Family.

## 2024-08-13 NOTE — TELEPHONE ENCOUNTER
Sent pt my chart message     Cristhian Patterson,    Dr. Chavez got your message and she just wanted to see if you would need a hospital follow up appointment?  She will try to fit you in if you let us know when you thought you would need it.  You can call us at [phone] or send a response here on [employer].    Thank you!

## 2024-08-13 NOTE — PLAN OF CARE
Problem: Infection  Goal: Absence of Infection Signs and Symptoms  8/13/2024 1431 by Padma Ayala, RN  Outcome: Progressing  8/13/2024 1431 by Padma Ayala, RN  Outcome: Progressing   Goal Outcome Evaluation:         A/O. Afebrile.   Denies pain and any other complain.   Ambulating in the hallways independently. Tolerating regular diet.     Po antibiotics now.

## 2024-08-13 NOTE — PROGRESS NOTES
[facility]    Medicine Progress Note - Hospitalist Service    Date of Admission:  8/10/2024    Assessment & Plan    Jeannine Rasheed is a 63 year old female who presented with chills, rigors and intermittent high-grade fever for the past 4 days.  Found to have pancytopenia and anaplasmosis.      Asnaplasmosis  Pancytopenia (leukopenia, microcytic anemia, thrombocytopenia)  -Presented with chills, rigors and intermittent high-grade fever of 4 days duration  -[place] [facility]  -Peripheral smear for parasitic infection pneding  -initially on Broad-spectrum antibiotics with Zosyn and vancomycin.    -MRSA nasal swab negative  - Chest x-ray is unremarkable, urinalysis pending  -Hematology consulted - reviewed  -Anemia workup sent  - Given that the Anaplasma PCR is positive, we will treat this a presumptive Anaplasmosis  - Per ID team, coinfection with Lyme disease can occur.  Doxycycline will treat this.  Serologies neg, but for acute Lyme they may not be positive yet.  Duration of therapy to cover both Anaplasma and potential Lyme infection (total 10 days)   - Treat for gram negative coverage till absolute neutropenia improves ( assuming that the count should at least show an improving trend). Improved from 0.5 to 0.6 on 8/13     Vitamin B12 deficiency  --found during weork up  --give shot  ---start oral replacement    Dilated CBD  -CT abdomen reported with Mild dilatation of the common bile duct  -Liver enzymes are mildly elevated with normal bilirubin total and direct  -MRCP negative  -trend lft    Imaging finding  Lobulated cystic area in the anterior right cardiophrenic fat, could represent pericardial cyst.  Outpatient follow-up    Type 2 diabetes mellitus  -accuchecks low  On Tresiba, Jardiance and Ozempic at home  -Continue PTA Tresiba 10 units at bedtime  -Stop sliding scale insulin as blood sugars running low at meal times   - May consider starting Jardiance on  "8/14    Hypothyroidism  -PTA levothyroxine        Diet: Combination Diet Regular Diet Adult; Moderate Consistent Carb (60 g CHO per Meal) Diet    DVT Prophylaxis: Pneumatic Compression Devices  Camargo Catheter: Not present  Lines: None     Cardiac Monitoring: None  Code Status: Full Code            Diet: Combination Diet Regular Diet Adult; Moderate Consistent Carb (60 g CHO per Meal) Diet    DVT Prophylaxis: Ambulate every shift  Camargo Catheter: Not present  Lines: None     Cardiac Monitoring: None  Code Status: Full Code      Clinically Significant Risk Factors              # Hypoalbuminemia: Lowest albumin = 3.1 g/dL at 8/11/2024  7:57 AM, will monitor as appropriate   # Thrombocytopenia: Lowest platelets = 25 in last 2 days, will monitor for bleeding             # Overweight: Estimated body mass index is 27.4 kg/m  as calculated from the following:    Height as of this encounter: 1.549 m (5' 1\").    Weight as of this encounter: 65.8 kg (145 lb)., PRESENT ON ADMISSION                  Disposition Plan     Medically Ready for Discharge: Anticipated Tomorrow             Sujatha Rangel MD  Hospitalist Service  [facility]  Securely message with [employer] (more info)  Text page via [facility] Paging/Directory   ______________________________________________________________________    Interval History   Charts reviewed and patient was examined.  No fevers  Patient feels a lot better. Denies any joint pains   Reassured by lab results   Physical Exam   Vital Signs: Temp: 97.3  F (36.3  C) Temp src: Oral BP: 116/61 Pulse: 63   Resp: 18 SpO2: 99 % O2 Device: None (Room air)    Weight: 145 lbs 0 oz    General Appearance: Female no apparent distress  Respiratory: Clear to auscultation bilaterally  Cardiovascular: Regular rate and rhythm without murmurs rubs or gallops  GI: Soft and nontender  Skin: No rashes.  No significant edema  Other: Neurologically gross intact without focal deficits " appreciated    Medical Decision Making             Data     I have personally reviewed the following data over the past 24 hrs:    1.8 (L)  \   9.2 (L)   / 38 (LL)     N/A N/A N/A /  123 (H)   N/A N/A 0.89 \       Imaging results reviewed over the past 24 hrs:   No results found for this or any previous visit (from the past 24 hour(s)).

## 2024-08-13 NOTE — PLAN OF CARE
Comfortable, afebrile, independent in the room. IV abx running. Blood sugars regularly under 100 and as low as 70's contacted provider about carb correction novolog and they said okay to hold it tonight while  before meal. One small bruise on left ankle pt discovered. Otherwise no other bruising. Pt reports no concern with standing and walking despite having severe weakness prior to admission.

## 2024-08-14 VITALS
HEIGHT: 61 IN | DIASTOLIC BLOOD PRESSURE: 64 MMHG | TEMPERATURE: 98.1 F | BODY MASS INDEX: 27.38 KG/M2 | SYSTOLIC BLOOD PRESSURE: 106 MMHG | HEART RATE: 74 BPM | RESPIRATION RATE: 18 BRPM | WEIGHT: 145 LBS | OXYGEN SATURATION: 96 %

## 2024-08-14 LAB
ALBUMIN SERPL BCG-MCNC: 3.3 G/DL (ref 3.5–5.2)
ALP SERPL-CCNC: 42 U/L (ref 40–150)
ALT SERPL W P-5'-P-CCNC: 68 U/L (ref 0–50)
AST SERPL W P-5'-P-CCNC: 97 U/L (ref 0–45)
BASOPHILS # BLD AUTO: 0 10E3/UL (ref 0–0.2)
BASOPHILS NFR BLD AUTO: 1 %
BILIRUB DIRECT SERPL-MCNC: <0.2 MG/DL (ref 0–0.3)
BILIRUB SERPL-MCNC: 0.2 MG/DL
CREAT SERPL-MCNC: 0.77 MG/DL (ref 0.51–0.95)
EGFRCR SERPLBLD CKD-EPI 2021: 87 ML/MIN/1.73M2
EOSINOPHIL # BLD AUTO: 0 10E3/UL (ref 0–0.7)
EOSINOPHIL NFR BLD AUTO: 1 %
ERYTHROCYTE [DISTWIDTH] IN BLOOD BY AUTOMATED COUNT: 16.6 % (ref 10–15)
GLUCOSE BLDC GLUCOMTR-MCNC: 81 MG/DL (ref 70–99)
HCT VFR BLD AUTO: 30.7 % (ref 35–47)
HGB BLD-MCNC: 9.7 G/DL (ref 11.7–15.7)
IMM GRANULOCYTES # BLD: 0 10E3/UL
IMM GRANULOCYTES NFR BLD: 1 %
LYMPHOCYTES # BLD AUTO: 1.2 10E3/UL (ref 0.8–5.3)
LYMPHOCYTES NFR BLD AUTO: 57 %
MCH RBC QN AUTO: 24.6 PG (ref 26.5–33)
MCHC RBC AUTO-ENTMCNC: 31.6 G/DL (ref 31.5–36.5)
MCV RBC AUTO: 78 FL (ref 78–100)
MONOCYTES # BLD AUTO: 0.2 10E3/UL (ref 0–1.3)
MONOCYTES NFR BLD AUTO: 11 %
NEUTROPHILS # BLD AUTO: 0.7 10E3/UL (ref 1.6–8.3)
NEUTROPHILS NFR BLD AUTO: 30 %
NRBC # BLD AUTO: 0 10E3/UL
NRBC BLD AUTO-RTO: 0 /100
PLAT MORPH BLD: NORMAL
PLATELET # BLD AUTO: 71 10E3/UL (ref 150–450)
PROT SERPL-MCNC: 6.2 G/DL (ref 6.4–8.3)
RBC # BLD AUTO: 3.95 10E6/UL (ref 3.8–5.2)
RBC MORPH BLD: NORMAL
WBC # BLD AUTO: 2.2 10E3/UL (ref 4–11)

## 2024-08-14 PROCEDURE — 85049 AUTOMATED PLATELET COUNT: CPT | Performed by: INTERNAL MEDICINE

## 2024-08-14 PROCEDURE — 250N000013 HC RX MED GY IP 250 OP 250 PS 637: Performed by: STUDENT IN AN ORGANIZED HEALTH CARE EDUCATION/TRAINING PROGRAM

## 2024-08-14 PROCEDURE — 36415 COLL VENOUS BLD VENIPUNCTURE: CPT | Performed by: INTERNAL MEDICINE

## 2024-08-14 PROCEDURE — 99232 SBSQ HOSP IP/OBS MODERATE 35: CPT | Performed by: INTERNAL MEDICINE

## 2024-08-14 PROCEDURE — 250N000013 HC RX MED GY IP 250 OP 250 PS 637: Performed by: FAMILY MEDICINE

## 2024-08-14 PROCEDURE — 84155 ASSAY OF PROTEIN SERUM: CPT | Performed by: INTERNAL MEDICINE

## 2024-08-14 PROCEDURE — 99239 HOSP IP/OBS DSCHRG MGMT >30: CPT | Performed by: STUDENT IN AN ORGANIZED HEALTH CARE EDUCATION/TRAINING PROGRAM

## 2024-08-14 PROCEDURE — 250N000013 HC RX MED GY IP 250 OP 250 PS 637: Performed by: INTERNAL MEDICINE

## 2024-08-14 PROCEDURE — 82565 ASSAY OF CREATININE: CPT | Performed by: STUDENT IN AN ORGANIZED HEALTH CARE EDUCATION/TRAINING PROGRAM

## 2024-08-14 RX ORDER — DOXYCYCLINE 100 MG/1
100 CAPSULE ORAL EVERY 12 HOURS
Qty: 13 CAPSULE | Refills: 0 | Status: SHIPPED | OUTPATIENT
Start: 2024-08-14 | End: 2024-08-21

## 2024-08-14 RX ORDER — LEVOFLOXACIN 750 MG/1
750 TABLET, FILM COATED ORAL DAILY
Qty: 7 TABLET | Refills: 0 | Status: SHIPPED | OUTPATIENT
Start: 2024-08-15 | End: 2024-08-22

## 2024-08-14 RX ADMIN — LEVOFLOXACIN 750 MG: 750 TABLET, FILM COATED ORAL at 08:27

## 2024-08-14 RX ADMIN — Medication 1000 MCG: at 08:27

## 2024-08-14 RX ADMIN — LEVOTHYROXINE SODIUM 100 MCG: 100 TABLET ORAL at 08:27

## 2024-08-14 RX ADMIN — EMPAGLIFLOZIN 25 MG: 25 TABLET, FILM COATED ORAL at 08:27

## 2024-08-14 RX ADMIN — DOXYCYCLINE 100 MG: 100 CAPSULE ORAL at 08:27

## 2024-08-14 ASSESSMENT — ACTIVITIES OF DAILY LIVING (ADL)
ADLS_ACUITY_SCORE: 23

## 2024-08-14 NOTE — PROGRESS NOTES
Care Management Discharge Note    Discharge Date: 08/14/2024       Discharge Disposition:  home    Discharge Services:  none    Discharge DME:  none    Discharge Transportation:  family to transport     Private pay costs discussed: Not applicable    Does the patient's insurance plan have a 3 day qualifying hospital stay waiver?  Yes     Which insurance plan 3 day waiver is available? Alternative insurance waiver    Will the waiver be used for post-acute placement? No    Education Provided on the Discharge Plan:  yes  Persons Notified of Discharge Plans: patient - per team   Patient/Family in Agreement with the Plan:  yes    Handoff Referral Completed: Yes    Additional Information:  No needs anticipated from CM at discharge per pt; independent at baseline. Pt to follow up with PCP post discharge if needs arise. Family to transport home.       DHAVAL Lamb

## 2024-08-14 NOTE — PLAN OF CARE
"  Problem: Adult Inpatient Plan of Care  Goal: Plan of Care Review  Description: The Plan of Care Review/Shift note should be completed every shift.  The Outcome Evaluation is a brief statement about your assessment that the patient is improving, declining, or no change.  This information will be displayed automatically on your shift  note.  Outcome: Adequate for Care Transition  Goal: Patient-Specific Goal (Individualized)  Description: You can add care plan individualizations to a care plan. Examples of Individualization might be:  \"Parent requests to be called daily at 9am for status\", \"I have a hard time hearing out of my right ear\", or \"Do not touch me to wake me up as it startles  me\".  Outcome: Adequate for Care Transition  Goal: Absence of Hospital-Acquired Illness or Injury  Outcome: Adequate for Care Transition  Intervention: Identify and Manage Fall Risk  Recent Flowsheet Documentation  Taken 8/14/2024 0903 by Julio Cesar Londono RN  Safety Promotion/Fall Prevention:   clutter free environment maintained   nonskid shoes/slippers when out of bed   safety round/check completed  Goal: Optimal Comfort and Wellbeing  Outcome: Adequate for Care Transition  Goal: Readiness for Transition of Care  Outcome: Adequate for Care Transition     Problem: Infection  Goal: Absence of Infection Signs and Symptoms  Outcome: Adequate for Care Transition   Goal Outcome Evaluation:    Care plan reviewed with patient. Patient overall progressing.      Pt doesn't have fever. Pt denied pain.    Patient discharge to home. Instruction discharge reviewed with the patient. Med given to the patient.          "

## 2024-08-14 NOTE — PLAN OF CARE
Problem: Infection  Goal: Absence of Infection Signs and Symptoms  8/13/2024 2011 by Pati Brock, RN  Outcome: Progressing  8/13/2024 0621 by Pati Brock, RN  Outcome: Progressing   Goal Outcome Evaluation:    Patient is A/O x4. VSS on RA.  Denies pain; waiting on labs for discharge; pt asserts that she will go home tomorrow if there are improvement in labs.

## 2024-08-14 NOTE — TELEPHONE ENCOUNTER
Patient called. Patient is being discharged and is calling to schedule HOSP follow up and lab appointment as instructed in hospital discharge summary. Patient assisted in scheduling appointments.     Andrez Esparza RN   Include Location In Plan?: No Detail Level: Generalized

## 2024-08-14 NOTE — DISCHARGE SUMMARY
"[facility]  Hospitalist Discharge Summary      Date of Admission:  8/10/2024  Date of Discharge:  8/14/2024  Discharging Provider: Kim Beaulieu MD  Discharge Service: Hospitalist Service    Discharge Diagnoses   Anaplasmosis    Clinically Significant Risk Factors     # Overweight: Estimated body mass index is 27.4 kg/m  as calculated from the following:    Height as of this encounter: 1.549 m (5' 1\").    Weight as of this encounter: 65.8 kg (145 lb).       Follow-ups Needed After Discharge   Follow-up Appointments     Follow-up and recommended labs and tests       Follow up with primary care provider (PCP), Carol Chavez, within 3-5   days for hospital follow- up, regarding new diagnosis, and to follow up on   results.  The following labs/tests are recommended: CBC, Liver function   test on 08/16/24. Iron anemia work up and treatment recommended after   resolution of infection- deferred to PCP. Fatty liver follow up and/or   referral to GI deferred to PCP.            Unresulted Labs Ordered in the Past 30 Days of this Admission       Date and Time Order Name Status Description    8/11/2024  1:34 AM Blood Culture Line, venous Preliminary     8/10/2024 10:52 PM Blood Culture Peripheral Blood Preliminary             Discharge Disposition   Discharged to home  Condition at discharge: Stable    Hospital Course   Jeannine Rasheed is a 63 year old female who presented with chills, rigors and intermittent high-grade fever for the past 4 days.  Found to have pancytopenia and anaplasmosis.   Sepsis 2/2 anaplasmosis (SIRS + JESS, acute hypoxic respiratory failure- resolved )   Anaplasmosis  Pancytopenia (leukopenia, microcytic anemia, thrombocytopenia)  -Presented with chills, rigors and intermittent high-grade fever of 4 days duration  -[place] [facility] [place]   -MRSA nasal swab negative  - Chest x-ray is unremarkable, urinalysis pending  -Hematology consulted - reviewed  - Given that " the Anaplasma PCR is positive, we will treat this a presumptive Anaplasmosis  - Per ID team, coinfection with Lyme disease can occur.  Doxycycline will treat this. Duration of therapy to cover both Anaplasma and potential Lyme infection (total 10 days)   - levofloxacin for 7 days for neutropenia prophylaxis.  Acute hypoxic respiratory failure-resolved  -- Required 2 L of oxygen. Later weaned off of it.  Vitamin B12 deficiency  --found during weork up  ---start oral replacement  Dilated CBD  -CT abdomen reported with Mild dilatation of the common bile duct  -Liver enzymes are mildly elevated with normal bilirubin total and direct  -MRCP negative  -trend lft  Fatty liver  -- PCP to follow   Imaging finding  Lobulated cystic area in the anterior right cardiophrenic fat, could represent pericardial cyst.  Outpatient follow-up  Type 2 diabetes mellitus  -accuchecks low  On Tresiba, Jardiance and Ozempic at home  -Continue PTA Tresiba 10 units at bedtime  Hypothyroidism  -PTA levothyroxine  Patient is clinically and hemodynamically stable for discharge. Medication reconciliation was done. Medications sent to patient's preferred pharmacy. All labs and imaging findings discussed with patient. Follow up appointments and recommendations as shown below. Patient/family verbalized understanding and agreed to plan of care. All questions answered.     Consultations This Hospital Stay   PHARMACY TO DOSE VANCO  HEMATOLOGY IP CONSULT  PHARMACY TO DOSE VANCO  INFECTIOUS DISEASES IP CONSULT    Code Status   Full Code    Time Spent on this Encounter   I, Kim Beaulieu MD, personally saw the patient today and spent greater than 30 minutes discharging this patient.       Kim Beaulieu MD  [address] [address]  Phone: [phone]  Fax: [phone]  ______________________________________________________________________    Physical Exam   Vital Signs: Temp: 98.1  F  (36.7  C) Temp src: Oral BP: 106/64 Pulse: 74   Resp: 18 SpO2: 96 % O2 Device: None (Room air)    Weight: 145 lbs 0 oz  GEN: Alert and oriented. Not in acute distress.  HEENT: Atraumatic, mucous membrane- moist and pink.  Chest: Bilateral air entry.  CVS: S1S2 regular.   Abdomen: Soft. Non-tender, non-distended. No organomegaly. No guarding or rigidity. Bowel sounds active.   Extremities: No pedal edema.  CNS: No involuntary movements.  Skin: no cyanosis or clubbing.        Primary Care Physician   Carol Chavez    Discharge Orders      Reason for your hospital stay    Anaplasmosis     Activity    Your activity upon discharge: activity as tolerated     Follow-up and recommended labs and tests     Follow up with primary care provider (PCP), Carol Chavez, within 3-5 days for hospital follow- up, regarding new diagnosis, and to follow up on results.  The following labs/tests are recommended: CBC, Liver function test on 08/16/24. Iron anemia work up and treatment recommended after resolution of infection- deferred to PCP. Fatty liver follow up and/or referral to GI deferred to PCP.     Diet    Follow this diet upon discharge: Orders Placed This Encounter      Combination Diet Regular Diet Adult; Moderate Consistent Carb (60 g CHO per Meal) Diet       Significant Results and Procedures       Discharge Medications   Current Discharge Medication List        START taking these medications    Details   cyanocobalamin (VITAMIN B-12) 1000 MCG sublingual tablet Place 1 tablet (1,000 mcg) under the tongue daily  Qty: 30 tablet, Refills: 1    Associated Diagnoses: Vitamin B12 deficiency (non anemic)      doxycycline monohydrate (MONODOX) 100 MG capsule Take 1 capsule (100 mg) by mouth every 12 hours for 13 doses  Qty: 13 capsule, Refills: 0    Associated Diagnoses: Positive Anaplasma serology      levofloxacin (LEVAQUIN) 750 MG tablet Take 1 tablet (750 mg) by mouth daily for 7 days  Qty: 7 tablet, Refills: 0    Associated  Diagnoses: Febrile illness, acute; Pancytopenia (H)           CONTINUE these medications which have NOT CHANGED    Details   empagliflozin (JARDIANCE) 25 MG TABS tablet Take 25 mg by mouth every morning      insulin degludec (TRESIBA) 100 UNIT/ML pen Inject 10 Units subcutaneously at bedtime      insulin pen needle (BD PEN NEEDLE GEORGIE 2ND GEN) 32G X 4 MM miscellaneous USE 1 PEN NEEDLE DAILY AS DIRECTED  Qty: 100 each, Refills: 2    Associated Diagnoses: Type 2 diabetes mellitus without complication, without long-term current use of insulin (H)      levothyroxine (SYNTHROID/LEVOTHROID) 100 MCG tablet Take 100 mcg by mouth every morning      metFORMIN (GLUCOPHAGE) 1000 MG tablet Take 1,000 mg by mouth 2 times daily (with meals)      ONETOUCH ULTRA test strip USE TO TEST BLOOD SUGAR THREE TIMES A DAY OR AS DIRECTED  Qty: 300 strip, Refills: 3    Associated Diagnoses: Type 2 diabetes mellitus without complication, without long-term current use of insulin (H)           Allergies   No Known Allergies

## 2024-08-14 NOTE — PROGRESS NOTES
"Infectious Diseases Progress Note  [facility]    Date of visit: 08/14/2024     ASSESSMENT   63-year-old woman with a history of diabetes, hypothyroidism, hyperlipidemia who is admitted with acute onset of fevers and pancytopenia.  ID is consulted for fevers.    Anaplasmosis.  Patient has frequent exposures to the outdoors.  No recent tick bites that she is aware of.  Anaplasma PCR positive.  Negative for Babesia and Ehrlichia.  Pancytopenia.  Last CBC was November 2023 which had mild anemia.  Presumably, pancytopenia is related to acute infection.  Will need to monitor for improvement on treatment.  Moderate neutropenia. Wbc and platelets are rising   Transaminitis.  Mild elevation.  Likely secondary to Anaplasma infection      Principal Problem:    Positive Anaplasma serology  Active Problems:    JESS (acute kidney injury) (H24)    Febrile illness, acute    Pancytopenia (H)       PLAN   -continue doxycycline, plan 10-day course, through 8/20  -levofloxacin 750mg daily for neutropenia prophylaxis, would discharge with 7-days  -repeat cbc with diff on Monday  -watch for fevers at home      Thien Dobbins MD  [facility]  Direct messaging: [place] Paging  On-Call ID provider: [phone], option: 9      ===========================================      SUBJECTIVE / INTERVAL HISTORY:     No fevers. Feels well. Walked 1 mile around unit this morning.      Antibiotics   Levofloxacin 8/13-  Doxycycline 8/11    Previous:  Zosyn 8/11-8/13  Vancomycin 8/11      Physical Exam     Temp:  [98.1  F (36.7  C)-98.5  F (36.9  C)] 98.1  F (36.7  C)  Pulse:  [66-74] 74  Resp:  [18] 18  BP: (102-118)/(64-79) 106/64  SpO2:  [96 %-100 %] 96 %    /64 (BP Location: Left arm)   Pulse 74   Temp 98.1  F (36.7  C) (Oral)   Resp 18   Ht 1.549 m (5' 1\")   Wt 65.8 kg (145 lb)   SpO2 96%   BMI 27.40 kg/m      GENERAL:  well-developed, well-nourished, sitting in chair in no acute distress.   HENT:  " Head is normocephalic, atraumatic.   EYES:  Eyes have anicteric sclerae without conjunctival injection   LUNGS:  normal resp pattern  SKIN:  No acute rashes.    NEUROLOGIC:  Grossly nonfocal.      Cultures   8/11 blood cultures x 2: No growth to date    No results found for the last 90 days.         Pertinent Labs:     Recent Labs   Lab 08/14/24  0641 08/13/24  0552 08/12/24  0652   WBC 2.2* 1.8* 1.3*   HGB 9.7* 9.2* 9.3*   PLT 71* 38* 26*       Recent Labs   Lab 08/14/24  0641 08/12/24  0652 08/11/24  1506 08/11/24  0757 08/10/24  2306   NA  --  139  --  133* 136   CO2  --  22  --  18* 19*   BUN  --  17.9  --  22.8 25.9*   ALBUMIN 3.3*  --   --  3.1* 3.8   ALKPHOS 42  --   --  37* 40   ALT 68*  --  46 51* 55*   AST 97*  --   --  87* 84*       Recent Labs   Lab 08/10/24  2306   CRPI 130.70*   SED 32*           Imaging:     MR Abdomen MRCP w/o & w Contrast    Result Date: 8/11/2024  EXAM: MR ABDOMEN MRCP W/O and W CONTRAST LOCATION: [facility] DATE: 8/11/2024 INDICATION: Elevated liver enzymes, abdominal pain and mild dilatation of the common bile duct with apparent distal CBD filling defect at today's earlier CT. COMPARISON: CT AP 8/11/2024 12:37 AM TECHNIQUE: Routine MR liver/pancreas protocol including axial and coronal MRCP sequences. 2D and 3D reconstruction performed by MR technologist including MIP reconstruction and slab cholangiograms. If performed with contrast, additional dynamic T1 post IV contrast images. CONTRAST: 7ml Gadavist FINDINGS: MRCP: Normal caliber intra and extrahepatic bile ducts with no stone, sludge, stricture or mass. Normal caliber pancreatic duct with classic anatomy. Gallbladder content with mild increased T1 signal (concentrated bile versus sludge) and minimal uniform mural edema but no discrete stones or significant inflammatory change. LIVER: Moderate diffuse hepatic steatosis. Liver is otherwise normal. PANCREAS: Normal. ADDITIONAL FINDINGS: No significant  abnormality in the spleen, kidneys, and adrenal glands. No adenopathy. No ascites.     IMPRESSION: 1.  Normal caliber intra and extrahepatic bile ducts with no stone, sludge, stricture or mass. 2.  Gallbladder content with mild increased T1 signal (concentrated bile versus sludge) and minimal uniform mural edema but no discrete stones or significant inflammatory change. 3.  Moderate diffuse hepatic steatosis.    CT Abdomen Pelvis w Contrast    Result Date: 8/11/2024  EXAM: CT ABDOMEN PELVIS W CONTRAST LOCATION: [facility] DATE: 8/11/2024 INDICATION: Abdominal pain non localized, hx fever. COMPARISON: None available. TECHNIQUE: CT scan of the abdomen and pelvis was performed after the injection of Isovue 370 90 mL intravenously. Multiplanar reformats were obtained. Dose reduction techniques were used. FINDINGS: LOWER CHEST: Lobulated cystic area in the anterior right cardiophrenic fat, could represent pericardial cyst. ABDOMEN/PELVIS: HEPATOBILIARY: No suspicious focal hepatic lesion. No radiodense gallstones. Nodular thickening of the gallbladder fundus, can be seen with gallbladder adenomyomatosis. Mild dilatation of the common bile duct with apparent small filling defect in the distal aspect of the common bile duct (series 3 image 86), could represent biliary ductal stone. PANCREAS: No main pancreatic ductal dilatation or definite solid pancreatic mass. SPLEEN: No splenomegaly. ADRENAL GLANDS: No adrenal nodules. KIDNEYS/BLADDER: No radiodense kidney/ureteral stones or hydronephrosis in either kidney. BOWEL: No abnormally dilated bowel loops. Moderate amount of stool in the colon. PERITONEUM: No evidence of free fluid in the abdomen and pelvis. No free peritoneal or portal venous gas. PELVIC ORGANS: Fibroid uterus. VASCULATURE: Moderate atherosclerotic vascular calcification of the abdominal aorta and iliac arteries. LYMPH NODES: No significant abdominopelvic lymphadenopathy.  MUSCULOSKELETAL: No suspicious osseous lesion.     IMPRESSION: 1.  Mild dilatation of the common bile duct with possible small filling defect in the distal aspect of the common bile duct, recommend correlation with liver enzymes and if elevated this can be further evaluated with MRCP to exclude biliary ductal stone. 2.  Lobulated cystic area in the anterior right cardiophrenic fat, could represent pericardial cyst. 3.  Fibroid uterus.    XR Chest 1 View    Result Date: 8/11/2024  EXAM: XR CHEST 1 VIEW LOCATION: [facility] DATE: 8/11/2024 INDICATION: fever COMPARISON: None.     IMPRESSION: Negative chest.         Data reviewed today: I reviewed all medications, new labs and imaging results over the last 24 hours. I personally reviewed no images or EKG's today.  The patient's care was discussed with the Bedside Nurse, Patient, and Primary team.

## 2024-08-14 NOTE — TELEPHONE ENCOUNTER
Second Attempt: I spoke with patient, she is still in the hospital and has a lot of questions. I told her she should address her questions to the Dr who is treating her a the hospital. She will contact us once she knows when she will be released to schedule her follow up appt.

## 2024-08-15 ENCOUNTER — OFFICE VISIT (OUTPATIENT)
Dept: FAMILY MEDICINE | Facility: CLINIC | Age: 64
End: 2024-08-15
Payer: COMMERCIAL

## 2024-08-15 VITALS
TEMPERATURE: 98.1 F | RESPIRATION RATE: 20 BRPM | HEART RATE: 82 BPM | DIASTOLIC BLOOD PRESSURE: 60 MMHG | OXYGEN SATURATION: 99 % | HEIGHT: 61 IN | BODY MASS INDEX: 26.96 KG/M2 | WEIGHT: 142.8 LBS | SYSTOLIC BLOOD PRESSURE: 106 MMHG

## 2024-08-15 DIAGNOSIS — E78.2 MIXED HYPERLIPIDEMIA: ICD-10-CM

## 2024-08-15 DIAGNOSIS — Z09 HOSPITAL DISCHARGE FOLLOW-UP: Primary | ICD-10-CM

## 2024-08-15 DIAGNOSIS — N17.9 AKI (ACUTE KIDNEY INJURY) (H): ICD-10-CM

## 2024-08-15 DIAGNOSIS — D61.818 PANCYTOPENIA (H): ICD-10-CM

## 2024-08-15 DIAGNOSIS — Z79.4 TYPE 2 DIABETES MELLITUS WITH HYPERGLYCEMIA, WITH LONG-TERM CURRENT USE OF INSULIN (H): ICD-10-CM

## 2024-08-15 DIAGNOSIS — E11.65 TYPE 2 DIABETES MELLITUS WITH HYPERGLYCEMIA, WITH LONG-TERM CURRENT USE OF INSULIN (H): ICD-10-CM

## 2024-08-15 DIAGNOSIS — A77.49 POSITIVE ANAPLASMA SEROLOGY: ICD-10-CM

## 2024-08-15 PROBLEM — R50.9 FEBRILE ILLNESS, ACUTE: Status: RESOLVED | Noted: 2024-08-11 | Resolved: 2024-08-15

## 2024-08-15 PROCEDURE — 99214 OFFICE O/P EST MOD 30 MIN: CPT | Performed by: FAMILY MEDICINE

## 2024-08-15 NOTE — TELEPHONE ENCOUNTER
Patient rescheduled for hospital follow-up this afternoon 8/15.  Will place orders at her visit, closing encounter.

## 2024-08-15 NOTE — TELEPHONE ENCOUNTER
Patient is coming in tomorrow for labs post hospital discharge and is in need of lab orders. Please advise. Thank you     Per Hospital discharge summary:    The following labs/tests are recommended: CBC, Liver function   test on 08/16/24. Iron anemia work up and treatment recommended after   resolution of infection- deferred to PCP

## 2024-08-15 NOTE — PROGRESS NOTES
Assessment & Plan     Hospital discharge follow-up  Positive Anaplasma serology  Patient is tolerating her antibiotics well.  She is feeling much improved.  Plan recheck blood counts in 1 and 4 days, LFTs in 4 days.  - CBC with platelets and differential; Future    Pancytopenia (H)  Patient will take precautions around others, especially ill people until her neutropenia is resolved.  Plan recheck blood counts tomorrow and in 4 days.  - CBC with platelets and differential; Future    JESS (acute kidney injury) (H24)  Resolved prior to discharge.    Type 2 diabetes mellitus with hyperglycemia, with long-term current use of insulin (H)  Patient continues on metformin and 10 unites of long-acting insulin.  Holding Ozempic for now as this could contribute to pancytopenia per hospitalist.    Mixed hyperlipidemia  Holding simvastatin for the time being as this can exacerbate pancytopenia per hospitalist.        MED REC REQUIRED  Post Medication Reconciliation Status: discharge medications reconciled and changed, per note/orders        Subjective   Yesenia is a 63 year old, presenting for the following health issues:  Hospital F/U        8/15/2024     1:52 PM   Additional Questions   Roomed by May PATTERSON LPN     [facility]         Hospital Follow-up Visit:    Hospital/Nursing Home/IP Rehab Facility: [facility]  Date of Admission: 8/10/24  Date of Discharge: 8/14/24  Reason(s) for Admission: Anaplasmosis   Was the patient in the ICU or did the patient experience delirium during hospitalization?  No  Do you have any other stressors you would like to discuss with your provider? No    Problems taking medications regularly:  None  Medication changes since discharge: cyanocobalamin (VITAMIN B-12) 1000 MCG sublingual tablet daily, doxycycline monohydrate (MONODOX) 100 MG capsule Q12 hours x 13 days, levofloxacin (LEVAQUIN) 750 MG tablet daily x 7 days  Problems adhering to non-medication therapy:  None    Summary of  "hospitalization:  [facility] discharge summary reviewed    Patient presented to the hospital with chills, rigors and fevers for the past 4 days.  She was ultimately found to have pancytopenia and tested positive for anaplasmosis.  She also had mildly elevated liver enzymes and was found on imaging to have hepatic steatosis.  Her Ozempic and simvastatin were held during her inpatient stay as they were thought to impede resolution of her pancytopenia.  She continues her insulin, jardiance and metformin.    Diagnostic Tests/Treatments reviewed.      Follow up needed: recheck blood counts and LFTs    Other Healthcare Providers Involved in Patient s Care:         None    Update since discharge: improved.     Patient notes that she is feeling back to normal.  She is not having any problems taking her antibiotics.  Her  has a URI currently, and so she has been staying with her daughter until her neutropenia resolves.      Plan of care communicated with patient               Review of Systems  Constitutional, HEENT, cardiovascular, pulmonary, gi and gu systems are negative, except as otherwise noted.      Objective    /60   Pulse 82   Temp 98.1  F (36.7  C) (Oral)   Resp 20   Ht 1.549 m (5' 1\")   Wt 64.8 kg (142 lb 12.8 oz)   SpO2 99%   BMI 26.98 kg/m    Body mass index is 26.98 kg/m .  Physical Exam   GENERAL: alert and no distress  EYES: Eyes grossly normal to inspection, PERRL and conjunctivae and sclerae normal  RESP: lungs clear to auscultation - no rales, rhonchi or wheezes  CV: regular rate and rhythm, normal S1 S2, no S3 or S4, no murmur, click or rub, no peripheral edema  ABDOMEN: soft, nontender, no hepatosplenomegaly, no masses and bowel sounds normal  MS: no gross musculoskeletal defects noted, no edema  SKIN: no suspicious lesions or rashes  NEURO: Normal strength and tone, mentation intact and speech normal  PSYCH: mentation appears normal, affect " normal/bright    Diagnostics: Pending        Signed Electronically by: Carol Chavez MD

## 2024-08-16 ENCOUNTER — LAB (OUTPATIENT)
Dept: LAB | Facility: CLINIC | Age: 64
End: 2024-08-16
Payer: COMMERCIAL

## 2024-08-16 DIAGNOSIS — A77.49 POSITIVE ANAPLASMA SEROLOGY: ICD-10-CM

## 2024-08-16 DIAGNOSIS — D61.818 PANCYTOPENIA (H): ICD-10-CM

## 2024-08-16 LAB
BACTERIA BLD CULT: NO GROWTH
BACTERIA BLD CULT: NO GROWTH
BASOPHILS # BLD MANUAL: 0 10E3/UL (ref 0–0.2)
BASOPHILS NFR BLD MANUAL: 0 %
EOSINOPHIL # BLD MANUAL: 0 10E3/UL (ref 0–0.7)
EOSINOPHIL NFR BLD MANUAL: 0 %
ERYTHROCYTE [DISTWIDTH] IN BLOOD BY AUTOMATED COUNT: 17.5 % (ref 10–15)
HCT VFR BLD AUTO: 33.7 % (ref 35–47)
HGB BLD-MCNC: 10.3 G/DL (ref 11.7–15.7)
LYMPHOCYTES # BLD MANUAL: 1.2 10E3/UL (ref 0.8–5.3)
LYMPHOCYTES NFR BLD MANUAL: 27 %
MCH RBC QN AUTO: 24 PG (ref 26.5–33)
MCHC RBC AUTO-ENTMCNC: 30.6 G/DL (ref 31.5–36.5)
MCV RBC AUTO: 78 FL (ref 78–100)
MONOCYTES # BLD MANUAL: 0.4 10E3/UL (ref 0–1.3)
MONOCYTES NFR BLD MANUAL: 9 %
MYELOCYTES # BLD MANUAL: 0.1 10E3/UL
MYELOCYTES NFR BLD MANUAL: 2 %
NEUTROPHILS # BLD MANUAL: 2.8 10E3/UL (ref 1.6–8.3)
NEUTROPHILS NFR BLD MANUAL: 62 %
NRBC # BLD AUTO: 0 10E3/UL
NRBC BLD AUTO-RTO: 0 /100
PLAT MORPH BLD: ABNORMAL
PLATELET # BLD AUTO: 176 10E3/UL (ref 150–450)
RBC # BLD AUTO: 4.3 10E6/UL (ref 3.8–5.2)
RBC MORPH BLD: ABNORMAL
WBC # BLD AUTO: 4.5 10E3/UL (ref 4–11)

## 2024-08-16 PROCEDURE — 36415 COLL VENOUS BLD VENIPUNCTURE: CPT

## 2024-08-16 PROCEDURE — 85007 BL SMEAR W/DIFF WBC COUNT: CPT

## 2024-08-16 PROCEDURE — 85027 COMPLETE CBC AUTOMATED: CPT

## 2024-08-19 ENCOUNTER — LAB (OUTPATIENT)
Dept: LAB | Facility: CLINIC | Age: 64
End: 2024-08-19
Payer: COMMERCIAL

## 2024-08-19 DIAGNOSIS — D61.818 PANCYTOPENIA (H): ICD-10-CM

## 2024-08-19 DIAGNOSIS — A77.49 POSITIVE ANAPLASMA SEROLOGY: ICD-10-CM

## 2024-08-19 LAB
ALBUMIN SERPL BCG-MCNC: 4.3 G/DL (ref 3.5–5.2)
ALP SERPL-CCNC: 45 U/L (ref 40–150)
ALT SERPL W P-5'-P-CCNC: 58 U/L (ref 0–50)
AST SERPL W P-5'-P-CCNC: 36 U/L (ref 0–45)
BASOPHILS # BLD AUTO: 0 10E3/UL (ref 0–0.2)
BASOPHILS NFR BLD AUTO: 1 %
BILIRUB DIRECT SERPL-MCNC: <0.2 MG/DL (ref 0–0.3)
BILIRUB SERPL-MCNC: 0.5 MG/DL
EOSINOPHIL # BLD AUTO: 0.1 10E3/UL (ref 0–0.7)
EOSINOPHIL NFR BLD AUTO: 1 %
ERYTHROCYTE [DISTWIDTH] IN BLOOD BY AUTOMATED COUNT: 18.5 % (ref 10–15)
HCT VFR BLD AUTO: 38.2 % (ref 35–47)
HGB BLD-MCNC: 11.5 G/DL (ref 11.7–15.7)
IMM GRANULOCYTES # BLD: 0 10E3/UL
IMM GRANULOCYTES NFR BLD: 1 %
LYMPHOCYTES # BLD AUTO: 1.1 10E3/UL (ref 0.8–5.3)
LYMPHOCYTES NFR BLD AUTO: 21 %
MCH RBC QN AUTO: 24.1 PG (ref 26.5–33)
MCHC RBC AUTO-ENTMCNC: 30.1 G/DL (ref 31.5–36.5)
MCV RBC AUTO: 80 FL (ref 78–100)
MONOCYTES # BLD AUTO: 0.4 10E3/UL (ref 0–1.3)
MONOCYTES NFR BLD AUTO: 8 %
NEUTROPHILS # BLD AUTO: 3.7 10E3/UL (ref 1.6–8.3)
NEUTROPHILS NFR BLD AUTO: 69 %
PLATELET # BLD AUTO: 156 10E3/UL (ref 150–450)
PROT SERPL-MCNC: 7.5 G/DL (ref 6.4–8.3)
RBC # BLD AUTO: 4.77 10E6/UL (ref 3.8–5.2)
WBC # BLD AUTO: 5.4 10E3/UL (ref 4–11)

## 2024-08-19 PROCEDURE — 80076 HEPATIC FUNCTION PANEL: CPT

## 2024-08-19 PROCEDURE — 85025 COMPLETE CBC W/AUTO DIFF WBC: CPT

## 2024-08-19 PROCEDURE — 36415 COLL VENOUS BLD VENIPUNCTURE: CPT

## 2024-09-06 ENCOUNTER — MYC MEDICAL ADVICE (OUTPATIENT)
Dept: FAMILY MEDICINE | Facility: CLINIC | Age: 64
End: 2024-09-06
Payer: COMMERCIAL

## 2024-09-06 ENCOUNTER — NURSE TRIAGE (OUTPATIENT)
Dept: NURSING | Facility: CLINIC | Age: 64
End: 2024-09-06

## 2024-09-06 DIAGNOSIS — Z79.4 TYPE 2 DIABETES MELLITUS WITH HYPERGLYCEMIA, WITH LONG-TERM CURRENT USE OF INSULIN (H): Primary | ICD-10-CM

## 2024-09-06 DIAGNOSIS — Z79.4 TYPE 2 DIABETES MELLITUS WITH HYPERGLYCEMIA, WITH LONG-TERM CURRENT USE OF INSULIN (H): ICD-10-CM

## 2024-09-06 DIAGNOSIS — E11.65 TYPE 2 DIABETES MELLITUS WITH HYPERGLYCEMIA, WITH LONG-TERM CURRENT USE OF INSULIN (H): Primary | ICD-10-CM

## 2024-09-06 DIAGNOSIS — E11.65 TYPE 2 DIABETES MELLITUS WITH HYPERGLYCEMIA, WITH LONG-TERM CURRENT USE OF INSULIN (H): ICD-10-CM

## 2024-09-06 NOTE — TELEPHONE ENCOUNTER
Nurse Triage SBAR    Situation: Medication refill request.     Background: Patient calling. Patient has not taken it for 3 days. She states her blood sugars have been ok but she does not want to wait.     Assessment: Patient states her Tresiba was frozen by accident due to her fridge malfunctioning and she needs a refill of it.     Recommendation:     Paging on call Dr Moira Page at 5:50pm. Spoke with the on call provider at 5:52pm. Per MD - Provider states she will send in a refill of the medication. Provider called back at 6:19pm and gave RN a telephone verbal okay to refill the medication as she was having difficulty getting access to the patients medication list.     Patient notified and she will check with her pharmacy in a while to see f the prescription was sent in.     Will route to pcp as well incase patient needs further refills.     Ana Martin, RN Nursing Advisor 9/6/2024 5:59 PM   Reason for Disposition   [1] Prescription refill request for ESSENTIAL medicine (i.e., likelihood of harm to patient if not taken) AND [2] triager unable to refill per department policy    Protocols used: Medication Refill and Renewal Call-A-

## 2024-09-07 NOTE — TELEPHONE ENCOUNTER
"Nurse Triage SBAR    Is this a 2nd Level Triage? NO    Situation: Call back fro patient who says she needs a refill of Tresiba. She says she has been out of this medication for the past 2 days. Per patient Rx has to be from [facility] to [facility] for receive \"express script price.\"     Background: Previous Tresiba pens were frozen by accident    Assessment:   Spoke to [facility] pharmacist who clarified that patient would be paying [facility] price for it if she picks it up at [facility]. Patient was informed of this information and she declined Rx to be sent to [facility]; says \"I'll figure it out tomorrow.\"    Protocol Recommended Disposition:   New information provided to patient.    Recommendation: n/a     Does the patient meet one of the following criteria for ADS visit consideration? No      Carin Van RN, BSN  Triage Nurse Advisor    "

## 2024-09-10 NOTE — TELEPHONE ENCOUNTER
I am not sure if we can clear this up for her or not.  Patient accidentally froze her insulin, and a refill was sent by an on-call physician (to [facility]).  I had asked patient if she needed a more urgent prescription sent to a local pharmacy, and so I sent it to her local pharmacy.  Now she is saying something about sending to [facility]?

## 2024-09-11 NOTE — TELEPHONE ENCOUNTER
Called and spoke with patient, she has been unable to get her Tresiba dispensed through local or mail order pharmacy. Is requesting a 90-day supply at local pharmacy.     Called and spoke with pharmacy. Insurance is requiring brand name Tresiba, they will put in for it to be filled as brand. A 90-day supply with have a $60 copay. They do not have stock of it today but will have it in tomorrow morning.    Called and updated patient. Patient stated understanding and is in agreement with plan, she will reach out to pharmacy in the morning. Of note, patient also denies any symptoms of hyperglycemia and reports no BG readings over 200. Advised patient to monitor and call back to clinic with any questions or concerns.    Jessica Ochoa RN

## 2024-09-12 ENCOUNTER — MYC MEDICAL ADVICE (OUTPATIENT)
Dept: FAMILY MEDICINE | Facility: CLINIC | Age: 64
End: 2024-09-12
Payer: COMMERCIAL

## 2024-09-12 DIAGNOSIS — E11.65 TYPE 2 DIABETES MELLITUS WITH HYPERGLYCEMIA, WITH LONG-TERM CURRENT USE OF INSULIN (H): Primary | ICD-10-CM

## 2024-09-12 DIAGNOSIS — Z79.4 TYPE 2 DIABETES MELLITUS WITH HYPERGLYCEMIA, WITH LONG-TERM CURRENT USE OF INSULIN (H): Primary | ICD-10-CM

## 2024-09-19 ENCOUNTER — TRANSFERRED RECORDS (OUTPATIENT)
Dept: HEALTH INFORMATION MANAGEMENT | Facility: CLINIC | Age: 64
End: 2024-09-19
Payer: COMMERCIAL

## 2024-10-16 ENCOUNTER — TELEPHONE (OUTPATIENT)
Dept: FAMILY MEDICINE | Facility: CLINIC | Age: 64
End: 2024-10-16
Payer: COMMERCIAL

## 2024-10-16 DIAGNOSIS — Z01.84 IMMUNITY STATUS TESTING: Primary | ICD-10-CM

## 2024-10-16 NOTE — TELEPHONE ENCOUNTER
Forms/Letter Request    Type of form/letter: OTHER: volunteer services       Do we have the form/letter: Yes: 10-    Who is the form from? Patient    Where did/will the form come from? Patient or family brought in       When is form/letter needed by: 5-7 business days    How would you like the form/letter returned:     Patient Notified form requests are processed in 5-7 business days:Yes    Could we send this information to you in [facility] I/O or would you prefer to receive a phone call?:   Patient would prefer a phone call   Okay to leave a detailed message?: Yes at Cell number on file:    Telephone Information:   Mobile [phone]    Paper work given to Rosey

## 2024-10-21 ENCOUNTER — LAB (OUTPATIENT)
Dept: LAB | Facility: CLINIC | Age: 64
End: 2024-10-21
Payer: COMMERCIAL

## 2024-10-21 DIAGNOSIS — E11.65 TYPE 2 DIABETES MELLITUS WITH HYPERGLYCEMIA, WITH LONG-TERM CURRENT USE OF INSULIN (H): Primary | ICD-10-CM

## 2024-10-21 DIAGNOSIS — Z01.84 IMMUNITY STATUS TESTING: ICD-10-CM

## 2024-10-21 DIAGNOSIS — Z79.4 TYPE 2 DIABETES MELLITUS WITH HYPERGLYCEMIA, WITH LONG-TERM CURRENT USE OF INSULIN (H): Primary | ICD-10-CM

## 2024-10-21 LAB
CREAT UR-MCNC: 74.9 MG/DL
MICROALBUMIN UR-MCNC: <12 MG/L
MICROALBUMIN/CREAT UR: NORMAL MG/G{CREAT}

## 2024-10-21 PROCEDURE — 86762 RUBELLA ANTIBODY: CPT

## 2024-10-21 PROCEDURE — 86765 RUBEOLA ANTIBODY: CPT

## 2024-10-21 PROCEDURE — 36415 COLL VENOUS BLD VENIPUNCTURE: CPT

## 2024-10-21 PROCEDURE — 82570 ASSAY OF URINE CREATININE: CPT

## 2024-10-21 PROCEDURE — 82043 UR ALBUMIN QUANTITATIVE: CPT

## 2024-10-21 PROCEDURE — 86787 VARICELLA-ZOSTER ANTIBODY: CPT

## 2024-10-21 PROCEDURE — 86735 MUMPS ANTIBODY: CPT

## 2024-10-22 LAB
MEV IGG SER IA-ACNC: 140 AU/ML
MEV IGG SER IA-ACNC: POSITIVE
MUMPS ANTIBODY IGG INSTRUMENT VALUE: 287 AU/ML
MUV IGG SER QL IA: POSITIVE
RUBV IGG SERPL QL IA: 6.63 INDEX
RUBV IGG SERPL QL IA: POSITIVE
VZV IGG SER QL IA: 24.8 S/CO
VZV IGG SER QL IA: POSITIVE

## 2024-10-28 DIAGNOSIS — E78.5 HYPERLIPIDEMIA: ICD-10-CM

## 2024-10-28 DIAGNOSIS — E03.9 HYPOTHYROIDISM, UNSPECIFIED TYPE: Primary | ICD-10-CM

## 2024-10-29 RX ORDER — LEVOTHYROXINE SODIUM 100 UG/1
100 TABLET ORAL DAILY
Qty: 90 TABLET | Refills: 0 | Status: SHIPPED | OUTPATIENT
Start: 2024-10-29

## 2024-10-29 RX ORDER — SIMVASTATIN 40 MG
40 TABLET ORAL AT BEDTIME
Qty: 90 TABLET | Refills: 0 | Status: SHIPPED | OUTPATIENT
Start: 2024-10-29

## 2024-11-11 ENCOUNTER — VIRTUAL VISIT (OUTPATIENT)
Dept: FAMILY MEDICINE | Facility: CLINIC | Age: 64
End: 2024-11-11
Payer: COMMERCIAL

## 2024-11-11 ENCOUNTER — MYC MEDICAL ADVICE (OUTPATIENT)
Dept: FAMILY MEDICINE | Facility: CLINIC | Age: 64
End: 2024-11-11

## 2024-11-11 DIAGNOSIS — U07.1 INFECTION DUE TO 2019 NOVEL CORONAVIRUS: Primary | ICD-10-CM

## 2024-11-11 DIAGNOSIS — E11.65 TYPE 2 DIABETES MELLITUS WITH HYPERGLYCEMIA, WITH LONG-TERM CURRENT USE OF INSULIN (H): ICD-10-CM

## 2024-11-11 DIAGNOSIS — Z79.4 TYPE 2 DIABETES MELLITUS WITH HYPERGLYCEMIA, WITH LONG-TERM CURRENT USE OF INSULIN (H): ICD-10-CM

## 2024-11-11 PROCEDURE — G2211 COMPLEX E/M VISIT ADD ON: HCPCS | Mod: 95 | Performed by: FAMILY MEDICINE

## 2024-11-11 PROCEDURE — 99214 OFFICE O/P EST MOD 30 MIN: CPT | Mod: 95 | Performed by: FAMILY MEDICINE

## 2024-11-11 NOTE — PROGRESS NOTES
Yesenia is a 64 year old who is being evaluated via a billable video visit.          1. Infection due to 2019 novel coronavirus (Primary)  Yesenia presents virtually for treatment for her COVID-19 infection.  She needs criteria as she is high risk with type 2 diabetes.  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  Most recent review of labs shows that these have normalized.  Her symptoms started 2 days ago and she tested positive yesterday.  She has fairly mild symptoms that is going on a cruise next weekend and would like treatment.  Review potential adverse side effects.  She does take a statin and she will hold that while she is taking the Paxlovid and can restart it 5 days after completing her course of Paxlovid.  - nirmatrelvir and ritonavir (PAXLOVID) 300 mg/100 mg therapy pack; Take 3 tablets by mouth 2 times daily for 5 days. (Take 2 Nirmatrelvir tablets and 1 Ritonavir tablet twice daily for 5 days)  Dispense: 30 tablet; Refill: 0    2. Type 2 diabetes mellitus with hyperglycemia, with long-term current use of insulin (H)  Well controlled      Subjective   Yesenia is a 64 year old, presenting for the following health issues:  Covid treatment        11/11/2024    12:50 PM   Additional Questions   Roomed by      HPI   She presents virtually to discuss treatment for COVID-19.  She did not meet RN criteria due to some previous lab abnormalities which were related to anaplasmosis.  She had slightly elevated LFTs and pancytopenia.  These have now normalized.  She does have type 2 diabetes.  She does take a statin.  Her symptoms are fairly mild.  They started about 2 days ago when she tested positive negative.  She had a fever yesterday of 100.  She has been sneezing and this is best felt a little tired and a little achy.  She has no chest pain or shortness of breath.  She has no underlying lung disorder.  The longitudinal plan of care for the  diagnosis(es)/condition(s) as documented were addressed during this visit. Due to the added complexity in care, I will continue to support Yesenia in the subsequent management and with ongoing continuity of care.               Objective           Vitals:  No vitals were obtained today due to virtual visit.    Physical Exam   GENERAL: alert and no distress  RESP: No audible wheeze, cough, or visible cyanosis.    PSYCH: Appropriate affect, tone, and pace of words          Video-Visit Details    Type of service:  Video Visit   Originating Location (pt. Location): Home    Distant Location (provider location):  Off-site  Platform used for Video Visit: Noris  Signed Electronically by: Imani Juan MD

## 2024-11-11 NOTE — TELEPHONE ENCOUNTER
RN COVID TREATMENT VISIT  11/11/24      The patient has been triaged and does not require a higher level of care.    Jeannine Rasheed  64 year old  Current weight? 142 lb    Has the patient been seen by a primary care provider at an [facility] or [facility] within the past two years? Yes.   Have you been in close proximity to/do you have a known exposure to a person with a confirmed case of influenza? No.     General treatment eligibility:  Date of positive COVID test (PCR or at home)?  11/10/24    Are you or have you been hospitalized for this COVID-19 infection? No.   Have you received monoclonal antibodies or antiviral treatment for COVID-19 since this positive test? No.   Do you have any of the following conditions that place you at risk of being very sick from COVID-19?   - Age 50 years or older  - Diabetes mellitus, type 1 and type 2  Yes, patient has at least one high risk condition as noted above.     Current COVID symptoms:   - fever or chills  - headache  - congestion or runny nose  Yes. Patient has at least one symptom as selected.     How many days since symptoms started? 5 days or less. Established patient, 12 years or older weighing at least 88.2 lbs, who has symptoms that started in the past 5 days, has not been hospitalized nor received treatment already, and is at risk for being very sick from COVID-19.     Treatment eligibility by RN:  Are you currently pregnant or nursing? No  Do you have a clinically significant hypersensitivity to nirmatrelvir or ritonavir, or toxic epidermal necrolysis (TEN) or Cook-Scott Syndrome? No  Do you have a history of hepatitis, any hepatic impairment on the Problem List (such as Child-Rolon Class C, cirrhosis, fatty liver disease, alcoholic liver disease), or was the last liver lab (hepatic panel, ALT, AST, ALK Phos, bilirubin) elevated in the past 6 months? YES  Do you have any history of severe renal impairment (eGFR < 30mL/min)?  No    Is patient eligible to continue? No, patient does not meet all eligibility requirements for the RN COVID treatment (as denoted by yes response(s) above). Patient informed they will need a virtual provider visit to assess treatment options.  Patient will be transferred to a  at the end of this call.   Jacob Rey RN

## 2024-11-24 SDOH — HEALTH STABILITY: PHYSICAL HEALTH: ON AVERAGE, HOW MANY MINUTES DO YOU ENGAGE IN EXERCISE AT THIS LEVEL?: 10 MIN

## 2024-11-24 SDOH — HEALTH STABILITY: PHYSICAL HEALTH: ON AVERAGE, HOW MANY DAYS PER WEEK DO YOU ENGAGE IN MODERATE TO STRENUOUS EXERCISE (LIKE A BRISK WALK)?: 3 DAYS

## 2024-11-24 ASSESSMENT — SOCIAL DETERMINANTS OF HEALTH (SDOH): HOW OFTEN DO YOU GET TOGETHER WITH FRIENDS OR RELATIVES?: THREE TIMES A WEEK

## 2024-12-03 SDOH — HEALTH STABILITY: PHYSICAL HEALTH: ON AVERAGE, HOW MANY DAYS PER WEEK DO YOU ENGAGE IN MODERATE TO STRENUOUS EXERCISE (LIKE A BRISK WALK)?: 3 DAYS

## 2024-12-03 SDOH — HEALTH STABILITY: PHYSICAL HEALTH: ON AVERAGE, HOW MANY MINUTES DO YOU ENGAGE IN EXERCISE AT THIS LEVEL?: 10 MIN

## 2024-12-03 ASSESSMENT — SOCIAL DETERMINANTS OF HEALTH (SDOH): HOW OFTEN DO YOU GET TOGETHER WITH FRIENDS OR RELATIVES?: THREE TIMES A WEEK

## 2024-12-04 ENCOUNTER — OFFICE VISIT (OUTPATIENT)
Dept: FAMILY MEDICINE | Facility: CLINIC | Age: 64
End: 2024-12-04
Attending: FAMILY MEDICINE
Payer: COMMERCIAL

## 2024-12-04 VITALS
WEIGHT: 141.8 LBS | SYSTOLIC BLOOD PRESSURE: 98 MMHG | RESPIRATION RATE: 16 BRPM | OXYGEN SATURATION: 97 % | HEIGHT: 61 IN | HEART RATE: 70 BPM | TEMPERATURE: 98.2 F | DIASTOLIC BLOOD PRESSURE: 62 MMHG | BODY MASS INDEX: 26.77 KG/M2

## 2024-12-04 DIAGNOSIS — Z79.4 TYPE 2 DIABETES MELLITUS WITH HYPERGLYCEMIA, WITH LONG-TERM CURRENT USE OF INSULIN (H): ICD-10-CM

## 2024-12-04 DIAGNOSIS — Z00.00 ROUTINE GENERAL MEDICAL EXAMINATION AT A HEALTH CARE FACILITY: Primary | ICD-10-CM

## 2024-12-04 DIAGNOSIS — K63.5 POLYP OF COLON, UNSPECIFIED PART OF COLON, UNSPECIFIED TYPE: ICD-10-CM

## 2024-12-04 DIAGNOSIS — E03.9 HYPOTHYROIDISM, UNSPECIFIED TYPE: ICD-10-CM

## 2024-12-04 DIAGNOSIS — E11.65 TYPE 2 DIABETES MELLITUS WITH HYPERGLYCEMIA, WITH LONG-TERM CURRENT USE OF INSULIN (H): ICD-10-CM

## 2024-12-04 DIAGNOSIS — E78.2 MIXED HYPERLIPIDEMIA: ICD-10-CM

## 2024-12-04 PROBLEM — A77.49 POSITIVE ANAPLASMA SEROLOGY: Status: RESOLVED | Noted: 2024-08-11 | Resolved: 2024-12-04

## 2024-12-04 PROBLEM — D61.818 PANCYTOPENIA (H): Status: RESOLVED | Noted: 2024-08-11 | Resolved: 2024-12-04

## 2024-12-04 LAB
ALBUMIN SERPL BCG-MCNC: 4.3 G/DL (ref 3.5–5.2)
ALP SERPL-CCNC: 36 U/L (ref 40–150)
ALT SERPL W P-5'-P-CCNC: 24 U/L (ref 0–50)
ANION GAP SERPL CALCULATED.3IONS-SCNC: 13 MMOL/L (ref 7–15)
AST SERPL W P-5'-P-CCNC: 22 U/L (ref 0–45)
BILIRUB SERPL-MCNC: 0.4 MG/DL
BUN SERPL-MCNC: 16.6 MG/DL (ref 8–23)
CALCIUM SERPL-MCNC: 9.7 MG/DL (ref 8.8–10.4)
CHLORIDE SERPL-SCNC: 105 MMOL/L (ref 98–107)
CREAT SERPL-MCNC: 0.75 MG/DL (ref 0.51–0.95)
EGFRCR SERPLBLD CKD-EPI 2021: 89 ML/MIN/1.73M2
ERYTHROCYTE [DISTWIDTH] IN BLOOD BY AUTOMATED COUNT: 16.2 % (ref 10–15)
EST. AVERAGE GLUCOSE BLD GHB EST-MCNC: 154 MG/DL
GLUCOSE SERPL-MCNC: 99 MG/DL (ref 70–99)
HBA1C MFR BLD: 7 % (ref 0–5.6)
HCO3 SERPL-SCNC: 22 MMOL/L (ref 22–29)
HCT VFR BLD AUTO: 37 % (ref 35–47)
HGB BLD-MCNC: 11.4 G/DL (ref 11.7–15.7)
MCH RBC QN AUTO: 23.4 PG (ref 26.5–33)
MCHC RBC AUTO-ENTMCNC: 30.8 G/DL (ref 31.5–36.5)
MCV RBC AUTO: 76 FL (ref 78–100)
PLATELET # BLD AUTO: 225 10E3/UL (ref 150–450)
POTASSIUM SERPL-SCNC: 4.6 MMOL/L (ref 3.4–5.3)
PROT SERPL-MCNC: 7.3 G/DL (ref 6.4–8.3)
RBC # BLD AUTO: 4.88 10E6/UL (ref 3.8–5.2)
SODIUM SERPL-SCNC: 140 MMOL/L (ref 135–145)
TSH SERPL DL<=0.005 MIU/L-ACNC: 0.65 UIU/ML (ref 0.3–4.2)
WBC # BLD AUTO: 5.5 10E3/UL (ref 4–11)

## 2024-12-04 PROCEDURE — 36415 COLL VENOUS BLD VENIPUNCTURE: CPT | Performed by: FAMILY MEDICINE

## 2024-12-04 PROCEDURE — 85027 COMPLETE CBC AUTOMATED: CPT | Performed by: FAMILY MEDICINE

## 2024-12-04 PROCEDURE — 99214 OFFICE O/P EST MOD 30 MIN: CPT | Mod: 25 | Performed by: FAMILY MEDICINE

## 2024-12-04 PROCEDURE — 99396 PREV VISIT EST AGE 40-64: CPT | Performed by: FAMILY MEDICINE

## 2024-12-04 PROCEDURE — 84443 ASSAY THYROID STIM HORMONE: CPT | Performed by: FAMILY MEDICINE

## 2024-12-04 PROCEDURE — 80053 COMPREHEN METABOLIC PANEL: CPT | Performed by: FAMILY MEDICINE

## 2024-12-04 PROCEDURE — 83036 HEMOGLOBIN GLYCOSYLATED A1C: CPT | Performed by: FAMILY MEDICINE

## 2024-12-04 NOTE — PATIENT INSTRUCTIONS
Patient Education   Preventive Care Advice   This is general advice given by our system to help you stay healthy. However, your care team may have specific advice just for you. Please talk to your care team about your preventive care needs.  Nutrition  Eat 5 or more servings of fruits and vegetables each day.  Try wheat bread, brown rice and whole grain pasta (instead of white bread, rice, and pasta).  Get enough calcium and vitamin D. Check the label on foods and aim for 100% of the RDA (recommended daily allowance).  Lifestyle  Exercise at least 150 minutes each week  (30 minutes a day, 5 days a week).  Do muscle strengthening activities 2 days a week. These help control your weight and prevent disease.  No smoking.  Wear sunscreen to prevent skin cancer.  Have a dental exam and cleaning every 6 months.  Yearly exams  See your health care team every year to talk about:  Any changes in your health.  Any medicines your care team has prescribed.  Preventive care, family planning, and ways to prevent chronic diseases.  Shots (vaccines)   HPV shots (up to age 26), if you've never had them before.  Hepatitis B shots (up to age 59), if you've never had them before.  COVID-19 shot: Get this shot when it's due.  Flu shot: Get a flu shot every year.  Tetanus shot: Get a tetanus shot every 10 years.  Pneumococcal, hepatitis A, and RSV shots: Ask your care team if you need these based on your risk.  Shingles shot (for age 50 and up)  General health tests  Diabetes screening:  Starting at age 35, Get screened for diabetes at least every 3 years.  If you are younger than age 35, ask your care team if you should be screened for diabetes.  Cholesterol test: At age 39, start having a cholesterol test every 5 years, or more often if advised.  Bone density scan (DEXA): At age 50, ask your care team if you should have this scan for osteoporosis (brittle bones).  Hepatitis C: Get tested at least once in your life.  STIs (sexually  transmitted infections)  Before age 24: Ask your care team if you should be screened for STIs.  After age 24: Get screened for STIs if you're at risk. You are at risk for STIs (including HIV) if:  You are sexually active with more than one person.  You don't use condoms every time.  You or a partner was diagnosed with a sexually transmitted infection.  If you are at risk for HIV, ask about PrEP medicine to prevent HIV.  Get tested for HIV at least once in your life, whether you are at risk for HIV or not.  Cancer screening tests  Cervical cancer screening: If you have a cervix, begin getting regular cervical cancer screening tests starting at age 21.  Breast cancer scan (mammogram): If you've ever had breasts, begin having regular mammograms starting at age 40. This is a scan to check for breast cancer.  Colon cancer screening: It is important to start screening for colon cancer at age 45.  Have a colonoscopy test every 10 years (or more often if you're at risk) Or, ask your provider about stool tests like a FIT test every year or Cologuard test every 3 years.  To learn more about your testing options, visit:   .  For help making a decision, visit:   https://bit.ly/kk75836.  Prostate cancer screening test: If you have a prostate, ask your care team if a prostate cancer screening test (PSA) at age 55 is right for you.  Lung cancer screening: If you are a current or former smoker ages 50 to 80, ask your care team if ongoing lung cancer screenings are right for you.  For informational purposes only. Not to replace the advice of your health care provider. Copyright   2023 [facility]. All rights reserved. Clinically reviewed by the [facility]. [employer] 406191 - REV 01/24.

## 2024-12-04 NOTE — PROGRESS NOTES
"Preventive Care Visit  [facility] STEPHANIEJOVANY Chavez MD, Family Medicine  Dec 4, 2024      Assessment & Plan     Routine general medical examination at a health care facility  Routine history and physical, updated in EMR.  Labs updated as below.  Immunizations are up-to-date.  Patient got her RSV vaccine at her pharmacy she states.  Mammogram is up-to-date, will be due later this month.  Patient has this scheduled.  Pap smear is up-to-date, no repeat needed.  Bone density testing was normal in 2022.  Colonoscopy is up-to-date, will be due in 2028.  Plan repeat physical in 1 year.    Type 2 diabetes mellitus with hyperglycemia, with long-term current use of insulin (H)  Last A1c was well within goal range.  Morning fasting sugars look great.  Asked patient to go up to 1 mg per dose Ozempic and come down to 8 units of her Tresiba.  She will update me with blood sugar readings on this regimen.  - Semaglutide, 1 MG/DOSE, (OZEMPIC) 4 MG/3ML pen; Inject 1 mg subcutaneously every 7 days.  - Hemoglobin A1c; Future  - Comprehensive metabolic panel (BMP + Alb, Alk Phos, ALT, AST, Total. Bili, TP); Future  - CBC with platelets; Future    Hypothyroidism, unspecified type  Continues on levothyroxine.  TSH updated today.  Pending at time of note.  - TSH with free T4 reflex; Future    Mixed hyperlipidemia  Continues on simvastatin, but held this for some time when she was on Paxlovid.  Will hold off on rechecking lipid profile today.  Was great at last check.  - Comprehensive metabolic panel (BMP + Alb, Alk Phos, ALT, AST, Total. Bili, TP); Future  - CBC with platelets; Future    Polyp of colon, unspecified part of colon, unspecified type  Patient will be due for colonoscopy at a 5-year interval in 2028.            BMI  Estimated body mass index is 27.24 kg/m  as calculated from the following:    Height as of this encounter: 1.537 m (5' 0.5\").    Weight as of this encounter: 64.3 kg (141 lb 12.8 oz).   Weight " management plan: Discussed healthy diet and exercise guidelines    Counseling  Appropriate preventive services were addressed with this patient via screening, questionnaire, or discussion as appropriate for fall prevention, nutrition, physical activity, Tobacco-use cessation, social engagement, weight loss and cognition.  Checklist reviewing preventive services available has been given to the patient.  Reviewed patient's diet, addressing concerns and/or questions.   She is at risk for lack of exercise and has been provided with information to increase physical activity for the benefit of her well-being.           Subjective   Yesenia is a 64 year old, presenting for the following:  Physical (AWV)        12/4/2024     8:40 AM   Additional Questions   Roomed by SILVIA Gonzales   Accompanied by alone          HPI    Brings in morning fasting sugars from October to end of November.  Vast majority are in goal range.  Off simvastatin for a time during a cruise and treatment with Paxlovid.  Back on for about 10 days, was off for 2 weeks.        Health Care Directive  Patient does not have a Health Care Directive: Discussed advance care planning with patient; information given to patient to review.      12/3/2024   General Health   How would you rate your overall physical health? Excellent   Feel stress (tense, anxious, or unable to sleep) Not at all            12/3/2024   Nutrition   Three or more servings of calcium each day? (!) NO   Diet: Diabetic    Carbohydrate counting   How many servings of fruit and vegetables per day? (!) 2-3   How many sweetened beverages each day? 0-1       Multiple values from one day are sorted in reverse-chronological order         12/3/2024   Exercise   Days per week of moderate/strenous exercise 3 days   Average minutes spent exercising at this level 10 min            12/3/2024   Social Factors   Frequency of gathering with friends or relatives Three times a week   Worry food won't last until get  money to buy more No   Food not last or not have enough money for food? No   Do you have housing? (Housing is defined as stable permanent housing and does not include staying ouside in a car, in a tent, in an abandoned building, in an overnight shelter, or couch-surfing.) Yes   Are you worried about losing your housing? No   Lack of transportation? No   Unable to get utilities (heat,electricity)? No            12/3/2024   Fall Risk   Fallen 2 or more times in the past year? No    Trouble with walking or balance? No        Patient-reported          12/3/2024   Dental   Dentist two times every year? Yes            11/24/2024   TB Screening   Were you born outside of the [place]? No              Today's PHQ-2 Score:       6/4/2024     8:23 AM   PHQ-2 ( 1999 [employer])   Q1: Little interest or pleasure in doing things 0    Q2: Feeling down, depressed or hopeless 0    PHQ-2 Score 0   Q1: Little interest or pleasure in doing things Not at all   Q2: Feeling down, depressed or hopeless Not at all   PHQ-2 Score 0       Patient-reported         12/3/2024   Substance Use   Alcohol more than 3/day or more than 7/wk No   Do you use any other substances recreationally? No        Social History     Tobacco Use    Smoking status: Never    Smokeless tobacco: Never   Vaping Use    Vaping status: Never Used   Substance Use Topics    Alcohol use: Yes     Comment: occasional    Drug use: No           12/29/2023   LAST FHS-7 RESULTS   1st degree relative breast or ovarian cancer No        Mammogram Screening - Mammogram every 1-2 years updated in Health Maintenance based on mutual decision making        12/3/2024   STI Screening   New sexual partner(s) since last STI/HIV test? No        History of abnormal Pap smear: No - age 30-64 HPV with reflex Pap every 5 years recommended        Latest Ref Rng & Units 11/21/2023     4:56 PM 12/5/2018     4:57 PM   PAP / HPV   PAP  Negative for Intraepithelial Lesion or Malignancy (NILM)  Negative for  squamous intraepithelial lesion or malignancy  Electronically signed by Ana Singh CT (ASCP) on 2018 at  3:16 PM      HPV 16 DNA Negative Negative  Negative    HPV 18 DNA Negative Negative  Negative    Other HR HPV Negative Negative  Negative      ASCVD Risk   The 10-year ASCVD risk score (Jennifer RIVERA, et al., 2019) is: 4.2%    Values used to calculate the score:      Age: 64 years      Sex: Female      Is Non- : No      Diabetic: Yes      Tobacco smoker: No      Systolic Blood Pressure: 98 mmHg      Is BP treated: No      HDL Cholesterol: 53 mg/dL      Total Cholesterol: 140 mg/dL           Reviewed and updated as needed this visit by Provider   Tobacco  Allergies  Meds  Problems               Past Medical History:   Diagnosis Date    JESS (acute kidney injury) (H) 2024    Diabetes (H)     Pancytopenia (H) 2024    Positive Anaplasma serology 2024    Thyroid disease      Past Surgical History:   Procedure Laterality Date    ANKLE FRACTURE SURGERY Right 2005    COLONOSCOPY      HC CORRECT BUNION,DOUBLE OSTEOTOMY      Description: Hallux Valgus (Bunion) Correction;  Recorded: 10/29/2013;  Comments: age 13    HC REVISE MEDIAN N/CARPAL TUNNEL SURG      Description: Neuroplasty Decompression Median Nerve At Carpal Tunnel;  Recorded: 10/26/2010;    SOFT TISSUE SURGERY  trigger finger and carpal tunnel     OB History    Para Term  AB Living   2 2 2 0 0 0   SAB IAB Ectopic Multiple Live Births   0 0 0 0 0      # Outcome Date GA Lbr Kendrick/2nd Weight Sex Type Anes PTL Lv   2 Term            1 Term              BP Readings from Last 3 Encounters:   24 98/62   08/15/24 106/60   24 106/64    Wt Readings from Last 3 Encounters:   24 64.3 kg (141 lb 12.8 oz)   08/15/24 64.8 kg (142 lb 12.8 oz)   08/10/24 65.8 kg (145 lb)                  Patient Active Problem List   Diagnosis    Hypothyroidism    Mixed hyperlipidemia    Type  2 diabetes mellitus with hyperglycemia, with long-term current use of insulin (H)    Diverticular disease of colon    Chronic right shoulder pain    Polyp of colon    Left wrist pain     Past Surgical History:   Procedure Laterality Date    ANKLE FRACTURE SURGERY Right 2005    COLONOSCOPY  2013    HC CORRECT BUNION,DOUBLE OSTEOTOMY      Description: Hallux Valgus (Bunion) Correction;  Recorded: 10/29/2013;  Comments: age 13    HC REVISE MEDIAN N/CARPAL TUNNEL SURG      Description: Neuroplasty Decompression Median Nerve At Carpal Tunnel;  Recorded: 10/26/2010;    SOFT TISSUE SURGERY  trigger finger and carpal tunnel       Social History     Tobacco Use    Smoking status: Never    Smokeless tobacco: Never   Substance Use Topics    Alcohol use: Yes     Comment: occasional     Family History   Problem Relation Age of Onset    Cancer Mother         lymphoma    Cerebrovascular Disease Mother 69    Hyperlipidemia Mother     Osteoporosis Mother     Heart Disease Father         bypass at 55    Hyperlipidemia Father     Heart Disease Brother         CAD s/p stents    Diabetes Brother     Coronary Artery Disease Brother     Cancer Maternal Grandmother         cervical    Heart Disease Paternal Grandmother     Breast Cancer No family hx of     Colon Cancer No family hx of     Diabetes No family hx of          Current Outpatient Medications   Medication Sig Dispense Refill    empagliflozin (JARDIANCE) 25 MG TABS tablet Take 25 mg by mouth every morning      insulin degludec (TRESIBA) 100 UNIT/ML pen Inject 10 Units subcutaneously at bedtime. 15 mL 0    insulin pen needle (BD PEN NEEDLE GEORGIE 2ND GEN) 32G X 4 MM miscellaneous USE 1 PEN NEEDLE DAILY AS DIRECTED 100 each 2    levothyroxine (SYNTHROID/LEVOTHROID) 100 MCG tablet TAKE 1 TABLET DAILY 90 tablet 0    metFORMIN (GLUCOPHAGE) 1000 MG tablet Take 1,000 mg by mouth 2 times daily (with meals)      ONETOUCH ULTRA test strip USE TO TEST BLOOD SUGAR THREE TIMES A DAY OR AS  "DIRECTED 300 strip 3    Semaglutide, 1 MG/DOSE, (OZEMPIC) 4 MG/3ML pen Inject 1 mg subcutaneously every 7 days. 3 mL 1    simvastatin (ZOCOR) 40 MG tablet TAKE 1 TABLET AT BEDTIME 90 tablet 0     No Known Allergies      Review of Systems  Constitutional, HEENT, cardiovascular, pulmonary, GI, , musculoskeletal, neuro, skin, endocrine and psych systems are negative, except as otherwise noted.     Objective    Exam  BP 98/62   Pulse 70   Temp 98.2  F (36.8  C) (Oral)   Resp 16   Ht 1.537 m (5' 0.5\")   Wt 64.3 kg (141 lb 12.8 oz)   SpO2 97%   BMI 27.24 kg/m     Estimated body mass index is 27.24 kg/m  as calculated from the following:    Height as of this encounter: 1.537 m (5' 0.5\").    Weight as of this encounter: 64.3 kg (141 lb 12.8 oz).    Physical Exam  GENERAL: alert and no distress  EYES: Eyes grossly normal to inspection, PERRL and conjunctivae and sclerae normal  HENT: ear canals and TM's normal, nose and mouth without ulcers or lesions  NECK: no adenopathy, no asymmetry, masses, or scars  RESP: lungs clear to auscultation - no rales, rhonchi or wheezes  BREAST: normal without masses, tenderness or nipple discharge and no palpable axillary masses or adenopathy  CV: regular rate and rhythm, normal S1 S2, no S3 or S4, no murmur, click or rub, no peripheral edema  ABDOMEN: soft, nontender, no hepatosplenomegaly, no masses and bowel sounds normal  MS: no gross musculoskeletal defects noted, no edema  SKIN: no suspicious lesions or rashes  NEURO: Normal strength and tone, mentation intact and speech normal  PSYCH: mentation appears normal, affect normal/bright        Signed Electronically by: Carol Chavez MD    "

## 2024-12-10 ENCOUNTER — MYC MEDICAL ADVICE (OUTPATIENT)
Dept: FAMILY MEDICINE | Facility: CLINIC | Age: 64
End: 2024-12-10
Payer: COMMERCIAL

## 2024-12-10 DIAGNOSIS — Z79.4 TYPE 2 DIABETES MELLITUS WITH HYPERGLYCEMIA, WITH LONG-TERM CURRENT USE OF INSULIN (H): Primary | ICD-10-CM

## 2024-12-10 DIAGNOSIS — E11.65 TYPE 2 DIABETES MELLITUS WITH HYPERGLYCEMIA, WITH LONG-TERM CURRENT USE OF INSULIN (H): Primary | ICD-10-CM

## 2024-12-30 ENCOUNTER — ANCILLARY PROCEDURE (OUTPATIENT)
Dept: MAMMOGRAPHY | Facility: CLINIC | Age: 64
End: 2024-12-30
Attending: FAMILY MEDICINE
Payer: COMMERCIAL

## 2024-12-30 DIAGNOSIS — Z12.31 VISIT FOR SCREENING MAMMOGRAM: ICD-10-CM

## 2024-12-30 PROCEDURE — 77067 SCR MAMMO BI INCL CAD: CPT

## 2024-12-30 PROCEDURE — 77063 BREAST TOMOSYNTHESIS BI: CPT

## 2025-01-07 DIAGNOSIS — E11.9 TYPE 2 DIABETES MELLITUS WITHOUT COMPLICATION, WITHOUT LONG-TERM CURRENT USE OF INSULIN (H): ICD-10-CM

## 2025-01-08 RX ORDER — BLOOD SUGAR DIAGNOSTIC
STRIP MISCELLANEOUS
Qty: 300 STRIP | Refills: 3 | Status: SHIPPED | OUTPATIENT
Start: 2025-01-08

## 2025-01-26 DIAGNOSIS — E78.5 HYPERLIPIDEMIA: ICD-10-CM

## 2025-01-26 DIAGNOSIS — E11.65 TYPE 2 DIABETES MELLITUS WITH HYPERGLYCEMIA, WITH LONG-TERM CURRENT USE OF INSULIN (H): Primary | ICD-10-CM

## 2025-01-26 DIAGNOSIS — E03.9 HYPOTHYROIDISM, UNSPECIFIED TYPE: ICD-10-CM

## 2025-01-26 DIAGNOSIS — Z79.4 TYPE 2 DIABETES MELLITUS WITH HYPERGLYCEMIA, WITH LONG-TERM CURRENT USE OF INSULIN (H): Primary | ICD-10-CM

## 2025-01-27 RX ORDER — EMPAGLIFLOZIN 25 MG/1
TABLET, FILM COATED ORAL
Qty: 90 TABLET | Refills: 3 | Status: SHIPPED | OUTPATIENT
Start: 2025-01-27

## 2025-01-27 RX ORDER — LEVOTHYROXINE SODIUM 100 UG/1
100 TABLET ORAL DAILY
Qty: 90 TABLET | Refills: 2 | Status: SHIPPED | OUTPATIENT
Start: 2025-01-27

## 2025-01-27 RX ORDER — SIMVASTATIN 40 MG
40 TABLET ORAL AT BEDTIME
Qty: 90 TABLET | Refills: 3 | Status: SHIPPED | OUTPATIENT
Start: 2025-01-27

## 2025-02-27 ENCOUNTER — TELEPHONE (OUTPATIENT)
Dept: FAMILY MEDICINE | Facility: CLINIC | Age: 65
End: 2025-02-27

## 2025-02-27 ENCOUNTER — E-VISIT (OUTPATIENT)
Dept: FAMILY MEDICINE | Facility: CLINIC | Age: 65
End: 2025-02-27
Payer: COMMERCIAL

## 2025-02-27 DIAGNOSIS — T75.3XXA MOTION SICKNESS, INITIAL ENCOUNTER: Primary | ICD-10-CM

## 2025-02-27 RX ORDER — SCOPOLAMINE 1 MG/3D
1 PATCH, EXTENDED RELEASE TRANSDERMAL
Qty: 4 PATCH | Refills: 0 | Status: SHIPPED | OUTPATIENT
Start: 2025-02-27

## 2025-02-27 NOTE — TELEPHONE ENCOUNTER
Incoming call from patient, she states she was recently diagnosed with vertigo while traveling in [place]. She is going on a cruise tomorrow and is hoping for a prescription to help with motion sickness.    Advised patient to submit e-visit. Patient plans to submit e-visit today and is hoping for prescription today.

## 2025-02-28 NOTE — PATIENT INSTRUCTIONS
Brenda Patterson,    I sent Rx for scopolamine patches.  These work nicely for motion sickness.  If you have any side effects (blurred vision), can take the patch off and try your meclizine.  If you have any residual vertigo, the meclizine may work better for this.  (Don't use meclizine in addition to the patch.)       View your full visit summary for details by clicking on the link below. Your pharmacist will be able to address any questions you may have about the medication.      Thank you for choosing us for your care.    Carol Chavez MD

## 2025-04-07 DIAGNOSIS — E11.9 TYPE 2 DIABETES MELLITUS WITHOUT COMPLICATION, WITHOUT LONG-TERM CURRENT USE OF INSULIN (H): ICD-10-CM

## 2025-04-08 RX ORDER — PEN NEEDLE, DIABETIC 32GX 5/32"
NEEDLE, DISPOSABLE MISCELLANEOUS
Qty: 100 EACH | Refills: 2 | Status: SHIPPED | OUTPATIENT
Start: 2025-04-08

## 2025-04-27 ENCOUNTER — HEALTH MAINTENANCE LETTER (OUTPATIENT)
Age: 65
End: 2025-04-27

## 2025-05-27 ENCOUNTER — OFFICE VISIT (OUTPATIENT)
Dept: FAMILY MEDICINE | Facility: CLINIC | Age: 65
End: 2025-05-27
Payer: COMMERCIAL

## 2025-05-27 VITALS
OXYGEN SATURATION: 100 % | HEIGHT: 61 IN | SYSTOLIC BLOOD PRESSURE: 116 MMHG | RESPIRATION RATE: 20 BRPM | WEIGHT: 131.4 LBS | TEMPERATURE: 98 F | BODY MASS INDEX: 24.81 KG/M2 | HEART RATE: 73 BPM | DIASTOLIC BLOOD PRESSURE: 54 MMHG

## 2025-05-27 DIAGNOSIS — Z79.4 TYPE 2 DIABETES MELLITUS WITH HYPERGLYCEMIA, WITH LONG-TERM CURRENT USE OF INSULIN (H): Primary | ICD-10-CM

## 2025-05-27 DIAGNOSIS — D50.9 IRON DEFICIENCY ANEMIA, UNSPECIFIED IRON DEFICIENCY ANEMIA TYPE: ICD-10-CM

## 2025-05-27 DIAGNOSIS — E78.2 MIXED HYPERLIPIDEMIA: ICD-10-CM

## 2025-05-27 DIAGNOSIS — E11.65 TYPE 2 DIABETES MELLITUS WITH HYPERGLYCEMIA, WITH LONG-TERM CURRENT USE OF INSULIN (H): Primary | ICD-10-CM

## 2025-05-27 PROBLEM — M25.532 LEFT WRIST PAIN: Status: RESOLVED | Noted: 2024-06-14 | Resolved: 2025-05-27

## 2025-05-27 LAB
CHOLEST SERPL-MCNC: 152 MG/DL
ERYTHROCYTE [DISTWIDTH] IN BLOOD BY AUTOMATED COUNT: 16.5 % (ref 10–15)
EST. AVERAGE GLUCOSE BLD GHB EST-MCNC: 126 MG/DL
FASTING STATUS PATIENT QL REPORTED: NO
FERRITIN SERPL-MCNC: 42 NG/ML (ref 11–328)
HBA1C MFR BLD: 6 % (ref 0–5.6)
HCT VFR BLD AUTO: 37.1 % (ref 35–47)
HDLC SERPL-MCNC: 53 MG/DL
HGB BLD-MCNC: 11.4 G/DL (ref 11.7–15.7)
LDLC SERPL CALC-MCNC: 81 MG/DL
MCH RBC QN AUTO: 24.1 PG (ref 26.5–33)
MCHC RBC AUTO-ENTMCNC: 30.7 G/DL (ref 31.5–36.5)
MCV RBC AUTO: 78 FL (ref 78–100)
NONHDLC SERPL-MCNC: 99 MG/DL
PLATELET # BLD AUTO: 259 10E3/UL (ref 150–450)
RBC # BLD AUTO: 4.73 10E6/UL (ref 3.8–5.2)
TRIGL SERPL-MCNC: 89 MG/DL
WBC # BLD AUTO: 5.6 10E3/UL (ref 4–11)

## 2025-05-27 PROCEDURE — 82728 ASSAY OF FERRITIN: CPT | Performed by: FAMILY MEDICINE

## 2025-05-27 PROCEDURE — 36415 COLL VENOUS BLD VENIPUNCTURE: CPT | Performed by: FAMILY MEDICINE

## 2025-05-27 PROCEDURE — 3074F SYST BP LT 130 MM HG: CPT | Performed by: FAMILY MEDICINE

## 2025-05-27 PROCEDURE — 83036 HEMOGLOBIN GLYCOSYLATED A1C: CPT | Performed by: FAMILY MEDICINE

## 2025-05-27 PROCEDURE — 3078F DIAST BP <80 MM HG: CPT | Performed by: FAMILY MEDICINE

## 2025-05-27 PROCEDURE — 99214 OFFICE O/P EST MOD 30 MIN: CPT | Performed by: FAMILY MEDICINE

## 2025-05-27 PROCEDURE — 85027 COMPLETE CBC AUTOMATED: CPT | Performed by: FAMILY MEDICINE

## 2025-05-27 NOTE — PROGRESS NOTES
"  Assessment & Plan     Type 2 diabetes mellitus with hyperglycemia, with long-term current use of insulin (H)  A1c will be updated today.  Pending at time of note.  Blood sugars have all been within normal range and patient continues to cut back slightly on her insulin dose, currently using 4 units daily.  Recommended cutting back on Jardiance to 10 mg daily.  Plan follow-up in 3 months and may eventually need to cut back slightly on Ozempic if she continues to lose weight.  She continues max dose metformin for now.  - HEMOGLOBIN A1C; Future  - empagliflozin (JARDIANCE) 10 MG TABS tablet; Take 1 tablet (10 mg) by mouth daily.    Mixed hyperlipidemia  Patient continues on 40 mg of simvastatin.  Updated lipid profile pending from today's visit.    Iron deficiency anemia, unspecified iron deficiency anemia type  Patient has been taking a chewable vitamin that includes iron.  Plan recheck hemoglobin and ferritin today.  - CBC with platelets; Future  - Ferritin; Future          BMI  Estimated body mass index is 25.24 kg/m  as calculated from the following:    Height as of this encounter: 1.537 m (5' 0.5\").    Weight as of this encounter: 59.6 kg (131 lb 6.4 oz).   Weight management plan: Discussed healthy diet and exercise guidelines          Subjective   Yesenia is a 64 year old, presenting for the following health issues:  Diabetes        5/27/2025     9:08 AM   Additional Questions   Roomed by May PATTERSON LPN     History of Present Illness       Diabetes:   She presents for follow up of diabetes.  She is checking home blood glucose one time daily.   She checks blood glucose before meals.  Blood glucose is never over 200 and never under 70. She is aware of hypoglycemia symptoms including shakiness.   She is concerned about other.    She is not experiencing numbness or burning in feet, excessive thirst, blurry vision, weight changes or redness, sores or blisters on feet.           She eats 2-3 servings of fruits and vegetables " "daily.She consumes 0 sweetened beverage(s) daily.She exercises with enough effort to increase her heart rate 10 to 19 minutes per day.  She exercises with enough effort to increase her heart rate 3 or less days per week. She is missing 1 dose(s) of medications per week.  She is not taking prescribed medications regularly due to remembering to take.        Patient presents today in follow-up of type 2 diabetes mellitus.  She brings in blood sugar readings from the months of March, April, and May.  All of these blood sugar readings are within goal range and patient has been steadily decreasing her long-acting insulin.  She took 8 units in March, 6 units in April, and 4 units in May.  She has some occasional morning fasting readings in the 1 teens and 120s in the month of May.  She also continues on maximum dose metformin and maximum dose Jardiance.  She is currently using 2 mg of Ozempic weekly.  Her appetite is definitely lower than previous.  She is down 11 pounds from her last visit in December.  She denies any abdominal pain, does have some constipation alternating with diarrhea, which is a chronic issue for her.  This has not worsened.  She has no new questions or concerns today.      Review of Systems  Constitutional, HEENT, cardiovascular, pulmonary, gi and gu systems are negative, except as otherwise noted.      Objective    /54   Pulse 73   Temp 98  F (36.7  C) (Oral)   Resp 20   Ht 1.537 m (5' 0.5\")   Wt 59.6 kg (131 lb 6.4 oz)   SpO2 100%   BMI 25.24 kg/m    Body mass index is 25.24 kg/m .  Physical Exam   GENERAL: alert and no distress  NECK: no adenopathy, no asymmetry, masses, or scars  RESP: lungs clear to auscultation - no rales, rhonchi or wheezes  CV: regular rate and rhythm, normal S1 S2, no S3 or S4, no murmur, click or rub, no peripheral edema  MS: no gross musculoskeletal defects noted, no edema  NEURO: Normal strength and tone, mentation intact and speech normal  PSYCH: mentation " appears normal, affect normal/bright    Diagnostics: Pending at time of note        Signed Electronically by: Carol Chavez MD

## 2025-05-28 ENCOUNTER — RESULTS FOLLOW-UP (OUTPATIENT)
Dept: FAMILY MEDICINE | Facility: CLINIC | Age: 65
End: 2025-05-28

## 2025-05-29 ENCOUNTER — RESULTS FOLLOW-UP (OUTPATIENT)
Dept: FAMILY MEDICINE | Facility: CLINIC | Age: 65
End: 2025-05-29

## 2025-07-15 ENCOUNTER — TRANSFERRED RECORDS (OUTPATIENT)
Dept: HEALTH INFORMATION MANAGEMENT | Facility: CLINIC | Age: 65
End: 2025-07-15
Payer: COMMERCIAL